# Patient Record
Sex: FEMALE | Race: WHITE | Employment: OTHER
[De-identification: names, ages, dates, MRNs, and addresses within clinical notes are randomized per-mention and may not be internally consistent; named-entity substitution may affect disease eponyms.]

---

## 2017-01-05 ENCOUNTER — PRIOR ORIGINAL RECORDS (OUTPATIENT)
Dept: OTHER | Age: 77
End: 2017-01-05

## 2017-01-06 RX ORDER — LORAZEPAM 0.5 MG/1
TABLET ORAL
Qty: 30 TABLET | Refills: 0 | Status: SHIPPED | OUTPATIENT
Start: 2017-01-06 | End: 2017-01-09

## 2017-01-09 RX ORDER — LORAZEPAM 0.5 MG/1
TABLET ORAL
Qty: 30 TABLET | Refills: 0 | Status: SHIPPED | OUTPATIENT
Start: 2017-01-09 | End: 2017-08-08

## 2017-02-13 ENCOUNTER — PRIOR ORIGINAL RECORDS (OUTPATIENT)
Dept: OTHER | Age: 77
End: 2017-02-13

## 2017-02-13 ENCOUNTER — APPOINTMENT (OUTPATIENT)
Dept: LAB | Age: 77
End: 2017-02-13
Attending: FAMILY MEDICINE
Payer: MEDICARE

## 2017-02-13 DIAGNOSIS — E66.9 OBESITY, UNSPECIFIED: ICD-10-CM

## 2017-02-13 DIAGNOSIS — I25.10 CORONARY ARTERY DISEASE INVOLVING NATIVE HEART WITHOUT ANGINA PECTORIS, UNSPECIFIED VESSEL OR LESION TYPE: ICD-10-CM

## 2017-02-13 DIAGNOSIS — I21.4 NSTEMI (NON-ST ELEVATED MYOCARDIAL INFARCTION) (HCC): ICD-10-CM

## 2017-02-13 DIAGNOSIS — E78.5 HYPERLIPIDEMIA, UNSPECIFIED HYPERLIPIDEMIA TYPE: ICD-10-CM

## 2017-02-13 DIAGNOSIS — E11.9 TYPE 2 DIABETES MELLITUS WITHOUT COMPLICATION, WITHOUT LONG-TERM CURRENT USE OF INSULIN (HCC): ICD-10-CM

## 2017-02-13 LAB
ALBUMIN SERPL-MCNC: 3.1 G/DL (ref 3.5–4.8)
ALP LIVER SERPL-CCNC: 106 U/L (ref 55–142)
ALT SERPL-CCNC: 13 U/L (ref 14–54)
AST SERPL-CCNC: 13 U/L (ref 15–41)
BILIRUB SERPL-MCNC: 0.3 MG/DL (ref 0.1–2)
BUN BLD-MCNC: 17 MG/DL (ref 8–20)
CALCIUM BLD-MCNC: 7.6 MG/DL (ref 8.3–10.3)
CHLORIDE: 113 MMOL/L (ref 101–111)
CHOLEST SMN-MCNC: 78 MG/DL (ref ?–200)
CO2: 26 MMOL/L (ref 22–32)
CREAT BLD-MCNC: 1.14 MG/DL (ref 0.55–1.02)
EST. AVERAGE GLUCOSE BLD GHB EST-MCNC: 134 MG/DL (ref 68–126)
GLUCOSE BLD-MCNC: 108 MG/DL (ref 70–99)
HBA1C MFR BLD HPLC: 6.3 % (ref ?–5.7)
HDLC SERPL-MCNC: 45 MG/DL (ref 45–?)
HDLC SERPL: 1.73 {RATIO} (ref ?–4.44)
LDLC SERPL CALC-MCNC: 16 MG/DL (ref ?–130)
M PROTEIN MFR SERPL ELPH: 6.4 G/DL (ref 6.1–8.3)
NONHDLC SERPL-MCNC: 33 MG/DL (ref ?–130)
POTASSIUM SERPL-SCNC: 4.8 MMOL/L (ref 3.6–5.1)
SODIUM SERPL-SCNC: 144 MMOL/L (ref 136–144)
TRIGLYCERIDES: 84 MG/DL (ref ?–150)
VLDL: 17 MG/DL (ref 5–40)

## 2017-02-13 PROCEDURE — 80053 COMPREHEN METABOLIC PANEL: CPT

## 2017-02-13 PROCEDURE — 80061 LIPID PANEL: CPT

## 2017-02-13 PROCEDURE — 36415 COLL VENOUS BLD VENIPUNCTURE: CPT

## 2017-02-13 PROCEDURE — 83036 HEMOGLOBIN GLYCOSYLATED A1C: CPT

## 2017-02-20 ENCOUNTER — OFFICE VISIT (OUTPATIENT)
Dept: FAMILY MEDICINE CLINIC | Facility: CLINIC | Age: 77
End: 2017-02-20

## 2017-02-20 ENCOUNTER — HOSPITAL ENCOUNTER (OUTPATIENT)
Dept: GENERAL RADIOLOGY | Age: 77
Discharge: HOME | End: 2017-02-20
Attending: FAMILY MEDICINE
Payer: MEDICARE

## 2017-02-20 VITALS
DIASTOLIC BLOOD PRESSURE: 68 MMHG | TEMPERATURE: 97 F | RESPIRATION RATE: 18 BRPM | SYSTOLIC BLOOD PRESSURE: 140 MMHG | HEIGHT: 62 IN | WEIGHT: 223 LBS | HEART RATE: 56 BPM | BODY MASS INDEX: 41.04 KG/M2

## 2017-02-20 DIAGNOSIS — M54.50 CHRONIC RIGHT-SIDED LOW BACK PAIN WITHOUT SCIATICA: ICD-10-CM

## 2017-02-20 DIAGNOSIS — E03.9 HYPOTHYROIDISM, UNSPECIFIED TYPE: ICD-10-CM

## 2017-02-20 DIAGNOSIS — E11.9 TYPE 2 DIABETES MELLITUS WITHOUT COMPLICATION, WITHOUT LONG-TERM CURRENT USE OF INSULIN (HCC): ICD-10-CM

## 2017-02-20 DIAGNOSIS — G89.29 CHRONIC RIGHT-SIDED LOW BACK PAIN WITHOUT SCIATICA: ICD-10-CM

## 2017-02-20 DIAGNOSIS — I10 ESSENTIAL HYPERTENSION WITH GOAL BLOOD PRESSURE LESS THAN 130/80: Primary | ICD-10-CM

## 2017-02-20 DIAGNOSIS — I25.10 CORONARY ARTERY DISEASE INVOLVING NATIVE CORONARY ARTERY OF NATIVE HEART WITHOUT ANGINA PECTORIS: ICD-10-CM

## 2017-02-20 DIAGNOSIS — E78.5 HYPERLIPIDEMIA, UNSPECIFIED HYPERLIPIDEMIA TYPE: ICD-10-CM

## 2017-02-20 LAB
APPEARANCE: CLEAR
MULTISTIX LOT#: NORMAL NUMERIC
PH, URINE: 5.5 (ref 4.5–8)
PROTEIN (URINE DIPSTICK): 30 MG/DL
SPECIFIC GRAVITY: 1.02 (ref 1–1.03)
URINE-COLOR: YELLOW
UROBILINOGEN,SEMI-QN: 0.2 MG/DL (ref 0–1.9)

## 2017-02-20 PROCEDURE — 81003 URINALYSIS AUTO W/O SCOPE: CPT | Performed by: FAMILY MEDICINE

## 2017-02-20 PROCEDURE — 99214 OFFICE O/P EST MOD 30 MIN: CPT | Performed by: FAMILY MEDICINE

## 2017-02-20 PROCEDURE — 72110 X-RAY EXAM L-2 SPINE 4/>VWS: CPT

## 2017-02-20 PROCEDURE — 72072 X-RAY EXAM THORAC SPINE 3VWS: CPT

## 2017-02-20 NOTE — PROGRESS NOTES
Dayne Sterling is a 77year old female. HPI:   Patient presents for recheck of her hypertension.  Pt has been following her diet as instructed, she does not take medications presently, home BP monitoring in the range of 652'Z systolic and 22'G diasto (mg/dL)   Date Value   04/09/2014 84   05/09/2013 115   09/18/2012 36   ----------  AST (U/L)   Date Value   02/13/2017 13*   11/14/2016 7*   10/24/2016 14*   04/09/2014 15   05/09/2013 25   09/18/2012 80*   ----------  ALT (U/L)   Date Value   02/13/2017 Social History:      Smoking Status: Never Smoker                      Smokeless Status: Never Used                        Alcohol Use: Yes           0.0 oz/week       0 Standard drinks or equivalent per week       Comment: 3 drinks a month         REVIE hyperlipidemia type  Continue atorvastatin 10 mg daily  - CMP in 6 months; Future  - Lipid in 6 months; Future    4. Hypothyroidism, unspecified type  Continue levothyroxine 300 µg daily  - TSH and Free T4 [E]; Future    5.  Type 2 diabetes mellitus without

## 2017-02-21 ENCOUNTER — TELEPHONE (OUTPATIENT)
Dept: FAMILY MEDICINE CLINIC | Facility: CLINIC | Age: 77
End: 2017-02-21

## 2017-02-21 DIAGNOSIS — G89.29 CHRONIC RIGHT-SIDED LOW BACK PAIN WITHOUT SCIATICA: Primary | ICD-10-CM

## 2017-02-21 DIAGNOSIS — M54.50 CHRONIC RIGHT-SIDED LOW BACK PAIN WITHOUT SCIATICA: Primary | ICD-10-CM

## 2017-03-01 ENCOUNTER — TELEPHONE (OUTPATIENT)
Dept: FAMILY MEDICINE CLINIC | Facility: CLINIC | Age: 77
End: 2017-03-01

## 2017-03-01 DIAGNOSIS — M54.50 CHRONIC RIGHT-SIDED LOW BACK PAIN WITHOUT SCIATICA: Primary | ICD-10-CM

## 2017-03-01 DIAGNOSIS — G89.29 CHRONIC RIGHT-SIDED LOW BACK PAIN WITHOUT SCIATICA: Primary | ICD-10-CM

## 2017-03-01 NOTE — TELEPHONE ENCOUNTER
Call from pt-sts \"dr dietz ordered an MRI for me last. Did not know how it was done, but scheduling explained. i cannot do that test unless they put me to sleep-am extremely claustrophobic. Can you see what else dr recommends? \"  Advised will update

## 2017-03-01 NOTE — TELEPHONE ENCOUNTER
Call to pt-advised of dr dietz's recommendation to do MRI with IV sedation-explained this and that she would need someone to drive her to and from that appt. Patient voices understanding/agrees with plan/no further questions.  Advised we will place new

## 2017-03-14 RX ORDER — SODIUM CHLORIDE, SODIUM LACTATE, POTASSIUM CHLORIDE, CALCIUM CHLORIDE 600; 310; 30; 20 MG/100ML; MG/100ML; MG/100ML; MG/100ML
INJECTION, SOLUTION INTRAVENOUS CONTINUOUS
Status: CANCELLED | OUTPATIENT
Start: 2017-03-14

## 2017-03-16 ENCOUNTER — TELEPHONE (OUTPATIENT)
Dept: FAMILY MEDICINE CLINIC | Facility: CLINIC | Age: 77
End: 2017-03-16

## 2017-03-16 NOTE — TELEPHONE ENCOUNTER
Received paperwork from Radiology asking for an H&P for pt's MRI being done 3/31/17. Called to pt and left message on unidentified vm, to call back and schedule.   Spoke with Dr Malcolm Cerda who stated that it was okay to schedule in 15 minute spot and block

## 2017-03-20 ENCOUNTER — APPOINTMENT (OUTPATIENT)
Dept: LAB | Age: 77
End: 2017-03-20
Payer: MEDICARE

## 2017-03-20 DIAGNOSIS — Z01.818 PRE-OP TESTING: ICD-10-CM

## 2017-03-20 LAB
ATRIAL RATE: 85 BPM
P AXIS: 72 DEGREES
P-R INTERVAL: 150 MS
Q-T INTERVAL: 404 MS
QRS DURATION: 132 MS
QTC CALCULATION (BEZET): 480 MS
R AXIS: -53 DEGREES
T AXIS: 24 DEGREES
VENTRICULAR RATE: 85 BPM

## 2017-03-20 PROCEDURE — 93005 ELECTROCARDIOGRAM TRACING: CPT

## 2017-03-20 PROCEDURE — 93010 ELECTROCARDIOGRAM REPORT: CPT | Performed by: INTERNAL MEDICINE

## 2017-03-27 ENCOUNTER — OFFICE VISIT (OUTPATIENT)
Dept: FAMILY MEDICINE CLINIC | Facility: CLINIC | Age: 77
End: 2017-03-27

## 2017-03-27 VITALS
HEART RATE: 68 BPM | TEMPERATURE: 98 F | SYSTOLIC BLOOD PRESSURE: 140 MMHG | OXYGEN SATURATION: 97 % | HEIGHT: 62 IN | WEIGHT: 224 LBS | RESPIRATION RATE: 14 BRPM | BODY MASS INDEX: 41.22 KG/M2 | DIASTOLIC BLOOD PRESSURE: 60 MMHG

## 2017-03-27 DIAGNOSIS — M54.50 CHRONIC RIGHT-SIDED LOW BACK PAIN WITHOUT SCIATICA: ICD-10-CM

## 2017-03-27 DIAGNOSIS — I25.10 CORONARY ARTERY DISEASE INVOLVING NATIVE HEART WITHOUT ANGINA PECTORIS, UNSPECIFIED VESSEL OR LESION TYPE: ICD-10-CM

## 2017-03-27 DIAGNOSIS — E66.9 DIABETES MELLITUS TYPE 2 IN OBESE (HCC): ICD-10-CM

## 2017-03-27 DIAGNOSIS — E11.69 DIABETES MELLITUS TYPE 2 IN OBESE (HCC): ICD-10-CM

## 2017-03-27 DIAGNOSIS — Z01.818 PREOPERATIVE CLEARANCE: Primary | ICD-10-CM

## 2017-03-27 DIAGNOSIS — E03.9 HYPOTHYROIDISM, UNSPECIFIED TYPE: ICD-10-CM

## 2017-03-27 DIAGNOSIS — I10 ESSENTIAL HYPERTENSION: ICD-10-CM

## 2017-03-27 DIAGNOSIS — G89.29 CHRONIC RIGHT-SIDED LOW BACK PAIN WITHOUT SCIATICA: ICD-10-CM

## 2017-03-27 PROCEDURE — 99214 OFFICE O/P EST MOD 30 MIN: CPT | Performed by: FAMILY MEDICINE

## 2017-03-27 RX ORDER — CLOPIDOGREL BISULFATE 75 MG/1
75 TABLET ORAL DAILY
Refills: 2 | COMMUNITY
Start: 2017-02-24 | End: 2021-05-25

## 2017-03-27 NOTE — PROGRESS NOTES
Slick Moore is a 69year old female who presents for a pre-operative physical exam. Patient is to have sedation for a thoracolumbar MRI, to be done in radiology at [facility] on 3/31/17.       HPI:   Her main complaint had been 3-4 months of a righ Cough    • H/O mammogram 10/21/1999   • History of PTCA 2011   • High blood pressure    • High cholesterol    • Asthma    • Disorder of thyroid    • Visual impairment      glasses   • Esophageal reflux    • Type II or unspecified type diabetes mellitus wit auscultation  CARDIO: RRR without murmur  GI: good BS's,no masses, HSM or tenderness  : deferred  EXTREMITIES: no cyanosis, clubbing or edema  NEURO: Oriented times three,cranial nerves are intact,motor and sensory are grossly intact    ASSESSMENT AND PL

## 2017-03-30 NOTE — IMAGING NOTE
-Left a message like to be on NPO x 8 hours prior, have a  to drive back home,be at the hospital not later than 1200 pm and the recovery is 3-6 hours. To call [phone] before 6pm today for questions.

## 2017-03-31 ENCOUNTER — ANESTHESIA (OUTPATIENT)
Dept: MRI IMAGING | Facility: HOSPITAL | Age: 77
End: 2017-03-31

## 2017-03-31 ENCOUNTER — HOSPITAL ENCOUNTER (OUTPATIENT)
Dept: MRI IMAGING | Facility: HOSPITAL | Age: 77
Discharge: HOME | End: 2017-03-31
Attending: FAMILY MEDICINE
Payer: MEDICARE

## 2017-03-31 ENCOUNTER — ANESTHESIA EVENT (OUTPATIENT)
Dept: MRI IMAGING | Facility: HOSPITAL | Age: 77
End: 2017-03-31

## 2017-03-31 VITALS
HEIGHT: 62 IN | WEIGHT: 210 LBS | BODY MASS INDEX: 38.64 KG/M2 | TEMPERATURE: 98 F | HEART RATE: 77 BPM | OXYGEN SATURATION: 99 % | SYSTOLIC BLOOD PRESSURE: 139 MMHG | DIASTOLIC BLOOD PRESSURE: 64 MMHG | RESPIRATION RATE: 16 BRPM

## 2017-03-31 DIAGNOSIS — M54.50 CHRONIC RIGHT-SIDED LOW BACK PAIN WITHOUT SCIATICA: ICD-10-CM

## 2017-03-31 DIAGNOSIS — G89.29 CHRONIC RIGHT-SIDED LOW BACK PAIN WITHOUT SCIATICA: ICD-10-CM

## 2017-03-31 DIAGNOSIS — Z01.818 PRE-OP TESTING: Primary | ICD-10-CM

## 2017-03-31 PROCEDURE — 72146 MRI CHEST SPINE W/O DYE: CPT

## 2017-03-31 PROCEDURE — 82962 GLUCOSE BLOOD TEST: CPT

## 2017-03-31 PROCEDURE — 72148 MRI LUMBAR SPINE W/O DYE: CPT

## 2017-03-31 RX ORDER — DEXTROSE MONOHYDRATE 25 G/50ML
50 INJECTION, SOLUTION INTRAVENOUS
Status: DISCONTINUED | OUTPATIENT
Start: 2017-03-31 | End: 2017-04-04

## 2017-03-31 RX ORDER — HYDROMORPHONE HYDROCHLORIDE 1 MG/ML
0.4 INJECTION, SOLUTION INTRAMUSCULAR; INTRAVENOUS; SUBCUTANEOUS EVERY 5 MIN PRN
Status: ACTIVE | OUTPATIENT
Start: 2017-03-31 | End: 2017-03-31

## 2017-03-31 RX ORDER — SODIUM CHLORIDE, SODIUM LACTATE, POTASSIUM CHLORIDE, CALCIUM CHLORIDE 600; 310; 30; 20 MG/100ML; MG/100ML; MG/100ML; MG/100ML
INJECTION, SOLUTION INTRAVENOUS CONTINUOUS
Status: DISCONTINUED | OUTPATIENT
Start: 2017-03-31 | End: 2017-04-04

## 2017-03-31 RX ORDER — NALOXONE HYDROCHLORIDE 0.4 MG/ML
80 INJECTION, SOLUTION INTRAMUSCULAR; INTRAVENOUS; SUBCUTANEOUS AS NEEDED
Status: ACTIVE | OUTPATIENT
Start: 2017-03-31 | End: 2017-03-31

## 2017-03-31 NOTE — ANESTHESIA POSTPROCEDURE EVALUATION
[address] Patient Status:  Outpatient   Age/Gender 69year old female MRN [id number]   [facility] MRI Attending Milvia Rodriguez MD   Hosp Day # 0 PCP Toño Kohler MD       Anesthesia Post-op Note    * No proc

## 2017-03-31 NOTE — ANESTHESIA PREPROCEDURE EVALUATION
PRE-OP EVALUATION    Patient Name: Ty Verdin    Pre-op Diagnosis: Chronic right-sided low back pain without sciatica     MRI THORACIC+LUMBAR SPINE     Pre-op vitals reviewed      Body mass index is 38.4 kg/(m^2). Current medications reviewed.   Hos       Comment x3    DUAL ENERGY X-RAY ABSORPTIOMETRY, BODY COMPOSITION STUDY, 1+ SITES  2011    SHOULDER SURG PROC UNLISTED      Comment R shoulder    COLONOSCOPY  2011    OTHER SURGICAL HISTORY      Comment [email or web address]    OTHER SURGICAL HI

## 2017-04-03 DIAGNOSIS — M48.04 SPINAL STENOSIS OF THORACIC REGION: Primary | ICD-10-CM

## 2017-04-17 RX ORDER — PANTOPRAZOLE SODIUM 40 MG/1
TABLET, DELAYED RELEASE ORAL
Qty: 90 TABLET | Refills: 1 | Status: SHIPPED | OUTPATIENT
Start: 2017-04-17 | End: 2017-10-16

## 2017-05-25 RX ORDER — LEVOTHYROXINE SODIUM 300 UG/1
TABLET ORAL
Qty: 90 TABLET | Refills: 0 | Status: SHIPPED | OUTPATIENT
Start: 2017-05-25 | End: 2017-06-19

## 2017-05-26 RX ORDER — LEVOTHYROXINE SODIUM 300 UG/1
TABLET ORAL
Qty: 90 TABLET | Refills: 0 | Status: SHIPPED | OUTPATIENT
Start: 2017-05-26 | End: 2017-06-19

## 2017-06-19 RX ORDER — LEVOTHYROXINE SODIUM 300 UG/1
TABLET ORAL
Qty: 90 TABLET | Refills: 0 | Status: SHIPPED | OUTPATIENT
Start: 2017-06-19 | End: 2017-10-16

## 2017-07-06 ENCOUNTER — PRIOR ORIGINAL RECORDS (OUTPATIENT)
Dept: OTHER | Age: 77
End: 2017-07-06

## 2017-07-06 LAB
CHOLESTEROL, TOTAL: 78 MG/DL
HDL CHOLESTEROL: 45 MG/DL
LDL CHOLESTEROL: 16 MG/DL
TRIGLYCERIDES: 84 MG/DL

## 2017-07-13 LAB
ALBUMIN: 3.1 G/DL
ALKALINE PHOSPHATATE(ALK PHOS): 106 IU/L
BILIRUBIN TOTAL: 0.3 MG/DL
BUN: 17 MG/DL
CALCIUM: 7.6 MG/DL
CHLORIDE: 113 MEQ/L
CHOLESTEROL, TOTAL: 78 MG/DL
CREATININE, SERUM: 1.14 MG/DL
GLUCOSE: 108 MG/DL
HDL CHOLESTEROL: 45 MG/DL
HEMOGLOBIN A1C: 6.3 %
LDL CHOLESTEROL: 16 MG/DL
POTASSIUM, SERUM: 4.8 MEQ/L
PROTEIN, TOTAL: 6.4 G/DL
SGOT (AST): 13 IU/L
SGPT (ALT): 13 IU/L
SODIUM: 144 MEQ/L
TRIGLYCERIDES: 84 MG/DL

## 2017-08-08 ENCOUNTER — PRIOR ORIGINAL RECORDS (OUTPATIENT)
Dept: OTHER | Age: 77
End: 2017-08-08

## 2017-08-08 ENCOUNTER — OFFICE VISIT (OUTPATIENT)
Dept: FAMILY MEDICINE CLINIC | Facility: CLINIC | Age: 77
End: 2017-08-08

## 2017-08-08 ENCOUNTER — LAB ENCOUNTER (OUTPATIENT)
Dept: LAB | Age: 77
End: 2017-08-08
Attending: FAMILY MEDICINE
Payer: MEDICARE

## 2017-08-08 VITALS
SYSTOLIC BLOOD PRESSURE: 110 MMHG | DIASTOLIC BLOOD PRESSURE: 60 MMHG | HEART RATE: 60 BPM | BODY MASS INDEX: 40.3 KG/M2 | WEIGHT: 219 LBS | RESPIRATION RATE: 16 BRPM | TEMPERATURE: 99 F | HEIGHT: 62 IN

## 2017-08-08 DIAGNOSIS — E78.5 HYPERLIPIDEMIA, UNSPECIFIED HYPERLIPIDEMIA TYPE: ICD-10-CM

## 2017-08-08 DIAGNOSIS — R29.898 LEG WEAKNESS, BILATERAL: ICD-10-CM

## 2017-08-08 DIAGNOSIS — Z01.84 IMMUNITY STATUS TESTING: ICD-10-CM

## 2017-08-08 DIAGNOSIS — E03.9 HYPOTHYROIDISM, UNSPECIFIED TYPE: ICD-10-CM

## 2017-08-08 DIAGNOSIS — D50.9 IRON DEFICIENCY ANEMIA, UNSPECIFIED IRON DEFICIENCY ANEMIA TYPE: ICD-10-CM

## 2017-08-08 DIAGNOSIS — E11.9 TYPE 2 DIABETES MELLITUS WITHOUT COMPLICATION, WITHOUT LONG-TERM CURRENT USE OF INSULIN (HCC): ICD-10-CM

## 2017-08-08 DIAGNOSIS — F32.A ANXIETY AND DEPRESSION: ICD-10-CM

## 2017-08-08 DIAGNOSIS — I25.10 CORONARY ARTERY DISEASE INVOLVING NATIVE HEART WITHOUT ANGINA PECTORIS, UNSPECIFIED VESSEL OR LESION TYPE: ICD-10-CM

## 2017-08-08 DIAGNOSIS — F41.9 ANXIETY AND DEPRESSION: ICD-10-CM

## 2017-08-08 DIAGNOSIS — I10 ESSENTIAL HYPERTENSION: ICD-10-CM

## 2017-08-08 DIAGNOSIS — Z00.00 ENCOUNTER FOR ANNUAL HEALTH EXAMINATION: Primary | ICD-10-CM

## 2017-08-08 DIAGNOSIS — I10 ESSENTIAL HYPERTENSION WITH GOAL BLOOD PRESSURE LESS THAN 130/80: ICD-10-CM

## 2017-08-08 DIAGNOSIS — E66.9 OBESITY, UNSPECIFIED: ICD-10-CM

## 2017-08-08 DIAGNOSIS — Z23 NEED FOR VACCINATION: ICD-10-CM

## 2017-08-08 DIAGNOSIS — E78.2 MIXED HYPERLIPIDEMIA: ICD-10-CM

## 2017-08-08 LAB
ALBUMIN SERPL-MCNC: 2.8 G/DL (ref 3.5–4.8)
ALP LIVER SERPL-CCNC: 114 U/L (ref 55–142)
ALT SERPL-CCNC: 11 U/L (ref 14–54)
AST SERPL-CCNC: 11 U/L (ref 15–41)
BASOPHILS # BLD AUTO: 0.05 X10(3) UL (ref 0–0.1)
BASOPHILS NFR BLD AUTO: 0.7 %
BILIRUB SERPL-MCNC: 0.3 MG/DL (ref 0.1–2)
BUN BLD-MCNC: 17 MG/DL (ref 8–20)
CALCIUM BLD-MCNC: 8 MG/DL (ref 8.3–10.3)
CHLORIDE: 111 MMOL/L (ref 101–111)
CHOLEST SMN-MCNC: 76 MG/DL (ref ?–200)
CO2: 27 MMOL/L (ref 22–32)
CREAT BLD-MCNC: 1.17 MG/DL (ref 0.55–1.02)
CREAT UR-SCNC: 173 MG/DL
EOSINOPHIL # BLD AUTO: 0.39 X10(3) UL (ref 0–0.3)
EOSINOPHIL NFR BLD AUTO: 5.2 %
ERYTHROCYTE [DISTWIDTH] IN BLOOD BY AUTOMATED COUNT: 16.1 % (ref 11.5–16)
EST. AVERAGE GLUCOSE BLD GHB EST-MCNC: 120 MG/DL (ref 68–126)
FREE T4: 1.7 NG/DL (ref 0.9–1.8)
GLUCOSE BLD-MCNC: 116 MG/DL (ref 70–99)
HBA1C MFR BLD HPLC: 5.8 % (ref ?–5.7)
HCT VFR BLD AUTO: 36.1 % (ref 34–50)
HDLC SERPL-MCNC: 43 MG/DL (ref 45–?)
HDLC SERPL: 1.77 {RATIO} (ref ?–4.44)
HGB BLD-MCNC: 10.5 G/DL (ref 12–16)
IMMATURE GRANULOCYTE COUNT: 0.03 X10(3) UL (ref 0–1)
IMMATURE GRANULOCYTE RATIO %: 0.4 %
LDLC SERPL CALC-MCNC: 17 MG/DL (ref ?–130)
LDLC SERPL-MCNC: 16 MG/DL (ref 5–40)
LYMPHOCYTES # BLD AUTO: 1.2 X10(3) UL (ref 0.9–4)
LYMPHOCYTES NFR BLD AUTO: 15.9 %
M PROTEIN MFR SERPL ELPH: 6.1 G/DL (ref 6.1–8.3)
MCH RBC QN AUTO: 25.7 PG (ref 27–33.2)
MCHC RBC AUTO-ENTMCNC: 29.1 G/DL (ref 31–37)
MCV RBC AUTO: 88.5 FL (ref 81–100)
MICROALBUMIN UR-MCNC: 0.73 MG/DL
MICROALBUMIN/CREAT 24H UR-RTO: 4.2 UG/MG (ref ?–30)
MONOCYTES # BLD AUTO: 0.56 X10(3) UL (ref 0.1–0.6)
MONOCYTES NFR BLD AUTO: 7.4 %
NEUTROPHIL ABS PRELIM: 5.3 X10 (3) UL (ref 1.3–6.7)
NEUTROPHILS # BLD AUTO: 5.3 X10(3) UL (ref 1.3–6.7)
NEUTROPHILS NFR BLD AUTO: 70.4 %
NONHDLC SERPL-MCNC: 33 MG/DL (ref ?–130)
PLATELET # BLD AUTO: 247 10(3)UL (ref 150–450)
POTASSIUM SERPL-SCNC: 4.4 MMOL/L (ref 3.6–5.1)
RBC # BLD AUTO: 4.08 X10(6)UL (ref 3.8–5.1)
RED CELL DISTRIBUTION WIDTH-SD: 52.2 FL (ref 35.1–46.3)
SODIUM SERPL-SCNC: 143 MMOL/L (ref 136–144)
TRIGLYCERIDES: 79 MG/DL (ref ?–150)
TSI SER-ACNC: 0.02 MIU/ML (ref 0.35–5.5)
WBC # BLD AUTO: 7.5 X10(3) UL (ref 4–13)

## 2017-08-08 PROCEDURE — 82043 UR ALBUMIN QUANTITATIVE: CPT

## 2017-08-08 PROCEDURE — 85025 COMPLETE CBC W/AUTO DIFF WBC: CPT

## 2017-08-08 PROCEDURE — 83036 HEMOGLOBIN GLYCOSYLATED A1C: CPT

## 2017-08-08 PROCEDURE — 82570 ASSAY OF URINE CREATININE: CPT

## 2017-08-08 PROCEDURE — G0009 ADMIN PNEUMOCOCCAL VACCINE: HCPCS | Performed by: FAMILY MEDICINE

## 2017-08-08 PROCEDURE — 84443 ASSAY THYROID STIM HORMONE: CPT

## 2017-08-08 PROCEDURE — 36415 COLL VENOUS BLD VENIPUNCTURE: CPT

## 2017-08-08 PROCEDURE — 86787 VARICELLA-ZOSTER ANTIBODY: CPT

## 2017-08-08 PROCEDURE — 80053 COMPREHEN METABOLIC PANEL: CPT

## 2017-08-08 PROCEDURE — 90732 PPSV23 VACC 2 YRS+ SUBQ/IM: CPT | Performed by: FAMILY MEDICINE

## 2017-08-08 PROCEDURE — 99214 OFFICE O/P EST MOD 30 MIN: CPT | Performed by: FAMILY MEDICINE

## 2017-08-08 PROCEDURE — 80061 LIPID PANEL: CPT

## 2017-08-08 PROCEDURE — 84439 ASSAY OF FREE THYROXINE: CPT

## 2017-08-08 PROCEDURE — G0439 PPPS, SUBSEQ VISIT: HCPCS | Performed by: FAMILY MEDICINE

## 2017-08-08 RX ORDER — LORAZEPAM 0.5 MG/1
TABLET ORAL
Qty: 30 TABLET | Refills: 1 | Status: SHIPPED | OUTPATIENT
Start: 2017-08-08 | End: 2017-10-16

## 2017-08-08 NOTE — PROGRESS NOTES
HPI:   Sonam Coker is a 69year old female who presents for a Medicare Subsequent Annual Wellness visit (Pt already had Initial Annual Wellness). Patient presents for recheck of her hypertension.  Pt has been following her diet as instructed, she Chemistry Labs:     Lab Results  Component Value Date   AST 11 (L) 08/08/2017   ALT 11 (L) 08/08/2017   CA 8.0 (L) 08/08/2017   ALB 2.8 (L) 08/08/2017   TSH 0.023 (L) 08/08/2017   CREATSERUM 1.17 (H) 08/08/2017    (H) 08/08/2017        CBC  (most re other surgical history. Her family history includes Cancer in her father. SOCIAL HISTORY:   She  reports that she has never smoked. She has never used smokeless tobacco. She reports that she drinks alcohol. She reports that she does not use drugs. Regular rate and rhythm, S1 and S2 normal, no murmur, rub, or gallop   Abdomen:   Soft, non-tender, bowel sounds active all four quadrants,  no masses, no organomegaly   Pelvic: Deferred   Extremities: Extremities normal, atraumatic, no cyanosis or edema OFFICE/OUTPT VISIT,EST,LEVL IV    Leg weakness, bilateral  -     OFFICE/OUTPT VISIT,EST,LEVL IV  -     OP REFERRAL TO [facility]    Need for vaccination  -     PNEUMOCOCCAL IMM (PNEUMOVAX)    Immunity status testing  -     VARICELLA Bathing or Showering: Able without help    Toileting: Able without help    Dressing: Able without help    Eating: Able without help    Driving: Able without help    Preparing your meals: Able without help    Managing money/bills: Able without help    Barrington Hagen AND SCHEDULE Internal Lab or Procedure External Lab or Procedure   Diabetes Screening      HbgA1C   Annually   Lab Results  Component Value Date   A1C 5.8 (H) 08/08/2017    No flowsheet data found.     Fasting Blood Sugar (FSB)Annually   Glucose (mg/dL)   D Once after 65 No vaccine history found Please get once after your 65th birthday    Pneumococcal 23 (Pneumovax)  Covered Once after 65 No vaccine history found Please get once after your 65th birthday    Hepatitis B for Moderate/High Risk No vaccine history data found. Dilated Eye exam  Annually Data entered on: 7/21/2016   Last Dilated Eye Exam 7/21/2016     No flowsheet data found. COPD      Spirometry Testing Annually Spirometry date:  No flowsheet data found.          Template: [facility] MEDICARE YO

## 2017-08-08 NOTE — PATIENT INSTRUCTIONS
Elan Kwok's SCREENING SCHEDULE   Tests on this list are recommended by your physician but may not be covered, or covered at this frequency, by your insurer. Please check with your insurance carrier before scheduling to verify coverage.    PREVENTATI or any previous visit.  Limited to patients who meet one of the following criteria:   • Men who are 73-68 years old and have smoked more than 100 cigarettes in their lifetime   • Anyone with a family history    Colorectal Cancer Screening  Covered up to Age Karen Soulier due on 03/11/1981 Please get this Mammogram regularly   Immunizations      Influenza  Covered Annually No orders found for this or any previous visit.  Please get every year    Pneumococcal 13 (Prevnar)  Covered Once after 65 No orders found Directives.

## 2017-08-16 LAB — VZV IGG SER IA-ACNC: POSITIVE

## 2017-08-21 ENCOUNTER — APPOINTMENT (OUTPATIENT)
Dept: PHYSICAL THERAPY | Age: 77
End: 2017-08-21
Attending: FAMILY MEDICINE
Payer: MEDICARE

## 2017-08-25 ENCOUNTER — OFFICE VISIT (OUTPATIENT)
Dept: PHYSICAL THERAPY | Age: 77
End: 2017-08-25
Attending: FAMILY MEDICINE
Payer: MEDICARE

## 2017-08-25 DIAGNOSIS — R29.898 LEG WEAKNESS, BILATERAL: ICD-10-CM

## 2017-08-25 PROCEDURE — 97163 PT EVAL HIGH COMPLEX 45 MIN: CPT

## 2017-08-25 PROCEDURE — 97110 THERAPEUTIC EXERCISES: CPT

## 2017-08-25 NOTE — PROGRESS NOTES
LOWER EXTREMITY EVALUATION:   Referring Physician: Dr. Yoly Hilliard  Diagnosis: Leg weakness, bilateral (L87.170)     Date of Service: 8/25/2017     PATIENT Yun Nvaas is a 69year old y/o female who presents to therapy today with complaints o head posture  Sensation: Sensation intact to light touch.     AROM:   Hip Knee Foot/Ankle   WNL Flexion: R 110 deg; L 125 deg  Extension: R 10 deg ext lag; L 0 deg DF: R +2; L +2     Flexibility:  Hip Flexor: R WNL, L WNL  Hamstrings: R 75 deg; L 80 deg  Ga deviations. (1)  Moderate Impairment: Walks 20’, slow speed, abnormal gait pattern, evidence of imbalance. (0)  Severe Impairment: Cannot walk 20’ without assistance, severe gait deviations or imbalance.      2. Change in gait speed: 2  Grading: Donny the moderate change in gait velocity, slows down, staggers but recovers, can continue to walk. (0) Severe Impairment: Preforms task with severe disruption of gait, i.e., staggers outside 15” path, loses balance, stops, reaches for wall.     Today’s Treatment a soon as possible to [phone].  If you have any questions, please contact me at Dept: [phone]    Sincerely,  Electronically signed by therapist: Jeannette Zavaleta PT    Physician's certification required: Yes  I certify the need for these services furn

## 2017-08-28 ENCOUNTER — APPOINTMENT (OUTPATIENT)
Dept: PHYSICAL THERAPY | Age: 77
End: 2017-08-28
Attending: FAMILY MEDICINE
Payer: MEDICARE

## 2017-08-29 ENCOUNTER — OFFICE VISIT (OUTPATIENT)
Dept: PHYSICAL THERAPY | Age: 77
End: 2017-08-29
Attending: FAMILY MEDICINE
Payer: MEDICARE

## 2017-08-29 PROCEDURE — 97116 GAIT TRAINING THERAPY: CPT

## 2017-08-29 PROCEDURE — 97112 NEUROMUSCULAR REEDUCATION: CPT

## 2017-08-29 NOTE — PROGRESS NOTES
Dx:   Leg weakness, bilateral (R29.898)          Authorized # of Visits:           Next MD visit: none scheduled  Fall Risk: standard         Precautions: n/a             Subjective: No new problems. Reports that she has been working on HEP 3x/day.     Obj

## 2017-08-31 ENCOUNTER — OFFICE VISIT (OUTPATIENT)
Dept: PHYSICAL THERAPY | Age: 77
End: 2017-08-31
Attending: FAMILY MEDICINE
Payer: MEDICARE

## 2017-08-31 PROCEDURE — 97112 NEUROMUSCULAR REEDUCATION: CPT

## 2017-08-31 NOTE — PROGRESS NOTES
Dx:   Leg weakness, bilateral (R29.898)          Authorized # of Visits:           Next MD visit: none scheduled  Fall Risk: standard         Precautions: n/a             Subjective: No new problems.   Reports that she is feeling a little stronger since sta

## 2017-09-05 ENCOUNTER — OFFICE VISIT (OUTPATIENT)
Dept: PHYSICAL THERAPY | Age: 77
End: 2017-09-05
Attending: FAMILY MEDICINE
Payer: MEDICARE

## 2017-09-05 PROCEDURE — 97112 NEUROMUSCULAR REEDUCATION: CPT

## 2017-09-05 NOTE — PROGRESS NOTES
Dx:   Leg weakness, bilateral (R29.898)          Authorized # of Visits:           Next MD visit: none scheduled  Fall Risk: standard         Precautions: n/a             Subjective: No new problems.   Reports she is feeling stronger but still has episodes training: lateral, narrow CR, bwd walking x10 min  CGA for all        Supine: SQC 10x 5 sec  Supine: SQC 10x 5 sec  shuttle leg press: 4 bands  bilat LE 2x10       Supine: L/R SLR x10 ea  Supine: L/R SLR x10 ea --        Supine: SAQ 10x 5 sec  Supine: SAQ

## 2017-09-07 ENCOUNTER — OFFICE VISIT (OUTPATIENT)
Dept: PHYSICAL THERAPY | Age: 77
End: 2017-09-07
Attending: FAMILY MEDICINE
Payer: MEDICARE

## 2017-09-07 PROCEDURE — 97112 NEUROMUSCULAR REEDUCATION: CPT

## 2017-09-07 NOTE — PROGRESS NOTES
Dx:   Leg weakness, bilateral (R29.898)          Authorized # of Visits:           Next MD visit: none scheduled  Fall Risk: standard         Precautions: n/a             Subjective: No new problems. Reports knees are less sore now following exercises.  Fe R/L LE lead: ASU, LSU 6\" step x12 ea       sit<>stand from chair x10   No UEs  sit<>stand from chair x10   No UEs  sit<>stand from chair x10   No UEs  30 sec sit<>stand: 9         TU sec       gait training: lateralaliyah, bwd walking x10 min

## 2017-09-11 ENCOUNTER — OFFICE VISIT (OUTPATIENT)
Dept: PHYSICAL THERAPY | Age: 77
End: 2017-09-11
Attending: FAMILY MEDICINE
Payer: MEDICARE

## 2017-09-11 PROCEDURE — 97112 NEUROMUSCULAR REEDUCATION: CPT

## 2017-09-11 NOTE — PROGRESS NOTES
Dx:   Leg weakness, bilateral (R29.898)          Authorized # of Visits:           Next MD visit: none scheduled  Fall Risk: standard         Precautions: n/a             Subjective: No new problems.   Reports that she was walking to [place] and noticed less ASU, LSU 4\" step x10 ea  ASU, LSU 4\" step x12 ea  ASU, LSU 6\" step x12 ea  R/L LE lead: ASU, LSU 6\" step x12 ea  R/L LE lead: ASU, LSU 6\" step x15 ea      sit<>stand from chair x10   No UEs  sit<>stand from chair x10   No UEs  sit<>stand from [place]

## 2017-09-14 ENCOUNTER — OFFICE VISIT (OUTPATIENT)
Dept: PHYSICAL THERAPY | Age: 77
End: 2017-09-14
Attending: FAMILY MEDICINE
Payer: MEDICARE

## 2017-09-14 PROCEDURE — 97112 NEUROMUSCULAR REEDUCATION: CPT

## 2017-09-14 NOTE — PROGRESS NOTES
Dx:   Leg weakness, bilateral (R29.898)          Authorized # of Visits:           Next MD visit: none scheduled  Fall Risk: standard         Precautions: n/a             Subjective: No new problems.   Reports that she was walking to [employer] and noticed less max  SLB:   R: 14 sec max  L: 5 sec max  SLB:   R: 12 sec max  L: 8 sec max  SLB:   R:  17 sec max  L: 12 sec max  SLB:   R:  19 sec max  L: 18 sec max  SLB:   R:  17 sec max  L: 23 sec max     ASU, LSU 4\" step x10 ea  ASU, LSU 4\" step x12 ea  ASU, LSU 6

## 2017-09-19 ENCOUNTER — OFFICE VISIT (OUTPATIENT)
Dept: PHYSICAL THERAPY | Age: 77
End: 2017-09-19
Attending: FAMILY MEDICINE
Payer: MEDICARE

## 2017-09-19 PROCEDURE — 97112 NEUROMUSCULAR REEDUCATION: CPT

## 2017-09-19 NOTE — PROGRESS NOTES
Dx:   Leg weakness, bilateral (R29.898)          Authorized # of Visits:           Next MD visit: none scheduled  Fall Risk: standard         Precautions: n/a             Subjective: No new problems.  Reports that she did a lot of walking on Sunday at the [facility] Abd, flex, ext x10 ea YTB  Standing SLR R/L LE:  Abd, flex, ext x12 ea YTB  Standing SLR R/L LE:  Abd, flex, ext x12 ea RTB  Standing SLR R/L LE:  Abd, flex, ext x12 ea RTB  Standing SLR R/L LE:  Abd, flex, ext x12 ea RTB  Standing SLR R/L LE:  Abd, flex, Charges: Nreed 3       Total Timed Treatment: 45 min  Total Treatment Time: 55 min

## 2017-09-22 ENCOUNTER — OFFICE VISIT (OUTPATIENT)
Dept: PHYSICAL THERAPY | Age: 77
End: 2017-09-22
Attending: FAMILY MEDICINE
Payer: MEDICARE

## 2017-09-22 PROCEDURE — 97112 NEUROMUSCULAR REEDUCATION: CPT

## 2017-09-22 NOTE — PROGRESS NOTES
Dx:   Leg weakness, bilateral (R29.898)          Authorized # of Visits:           Next MD visit: none scheduled  Fall Risk: standard         Precautions: n/a             Subjective: No new problems. Reports she continues to feel stronger.   Denies falls si LE:  Abd, flex, ext x12 ea YTB  Standing SLR R/L LE:  Abd, flex, ext x12 ea RTB  Standing SLR R/L LE:  Abd, flex, ext x12 ea RTB  Standing SLR R/L LE:  Abd, flex, ext x12 ea RTB  Standing SLR R/L LE:  Abd, flex, ext x15 ea RTB  Standing SLR R/L LE:  Abd, f Supine: SAQ 10x 5 sec  Supine: SAQ 10x 5 sec --  --  --    CP x10 min bilat knees  CP x10 min bilat knees  CP x10 min bilat knees  CP x10 min bilat knees  CP x10 min bilat knees  CP x10 min bilat knees   CP x10 min bilat knees  CP x10 min bilat knees

## 2017-09-26 ENCOUNTER — OFFICE VISIT (OUTPATIENT)
Dept: PHYSICAL THERAPY | Age: 77
End: 2017-09-26
Attending: FAMILY MEDICINE
Payer: MEDICARE

## 2017-09-26 PROCEDURE — 97112 NEUROMUSCULAR REEDUCATION: CPT

## 2017-09-26 NOTE — PROGRESS NOTES
Progress Summary    Pt has attended 10, cancelled 0, and no shown 0 visits in Physical Therapy. Katie Villatorobrittnipavan reports feeling 50% improvement in balance and function since starting therapy.  She reports that overall she is feeling better with community ambulation b progress achieved thus far    Plan: Continue PT POC x 4 more visits to achieve goals still in progress.    Patient/Family/Caregiver was advised of these findings, precautions, and treatment options and has agreed to actively participate in planning and for flex, ext x12 ea RTB  Standing SLR R/L LE:  Abd, flex, ext x15 ea RTB  Standing SLR R/L LE:  Abd, flex, ext x15 ea RTB  Standing SLR R/L LE:  Abd, flex, ext x15 ea RTB    R/L LE lead: ASU, LSU 6\" step x15 ea  R/L LE lead: ASU, LSU 6\" step x15 ea  R/L LE

## 2017-09-28 ENCOUNTER — OFFICE VISIT (OUTPATIENT)
Dept: PHYSICAL THERAPY | Age: 77
End: 2017-09-28
Attending: FAMILY MEDICINE
Payer: MEDICARE

## 2017-09-28 PROCEDURE — 97112 NEUROMUSCULAR REEDUCATION: CPT

## 2017-09-28 NOTE — PROGRESS NOTES
Dx:   Leg weakness, bilateral (R29.898)          Authorized # of Visits:           Next MD visit: none scheduled  Fall Risk: standard         Precautions: n/a             Subjective: Reports no new problems and doing okay since last visit.   Working on ASU, LSU 6\" step x15 ea  R/L LE lead: ASU, LSU 8\" step x10 ea  R/L LE lead: ASU, LSU 8\" step x10 ea  L LE lead: ASU 8\" step x5 . ...stopped after 5th rep due to pt exhibiting a cramp in posterior knee which did not resolve completely even with rest and

## 2017-10-03 ENCOUNTER — APPOINTMENT (OUTPATIENT)
Dept: PHYSICAL THERAPY | Age: 77
End: 2017-10-03
Attending: FAMILY MEDICINE
Payer: MEDICARE

## 2017-10-06 ENCOUNTER — OFFICE VISIT (OUTPATIENT)
Dept: PHYSICAL THERAPY | Age: 77
End: 2017-10-06
Attending: FAMILY MEDICINE
Payer: MEDICARE

## 2017-10-06 PROCEDURE — 97112 NEUROMUSCULAR REEDUCATION: CPT

## 2017-10-06 NOTE — PROGRESS NOTES
Dx:   Leg weakness, bilateral (R29.898)          Authorized # of Visits:           Next MD visit: none scheduled  Fall Risk: standard         Precautions: n/a             Subjective: Reports that the left hamstring soreness remained resolved following x15 ea RTB    R/L LE lead: ASU, LSU 6\" step x15 ea  R/L LE lead: ASU, LSU 6\" step x15 ea  R/L LE lead: ASU, LSU 8\" step x10 ea  R/L LE lead: ASU, LSU 8\" step x10 ea  L LE lead: ASU 8\" step x5 . ...stopped after 5th rep due to pt exhibiting a cramp in p

## 2017-10-10 ENCOUNTER — OFFICE VISIT (OUTPATIENT)
Dept: PHYSICAL THERAPY | Age: 77
End: 2017-10-10
Attending: FAMILY MEDICINE
Payer: MEDICARE

## 2017-10-10 PROCEDURE — 97112 NEUROMUSCULAR REEDUCATION: CPT

## 2017-10-10 NOTE — PROGRESS NOTES
Dx:   Leg weakness, bilateral (R29.898)          Authorized # of Visits:           Next MD visit: none scheduled  Fall Risk: standard         Precautions: n/a             Subjective: Reports that she is feeling stronger overall and balance is better.  D RTB  Standing SLR R/L LE:  Abd, flex, ext x15 ea RTB  lateral walking with RTB 35ft x 3    R/L LE lead: ASU, LSU 6\" step x15 ea  R/L LE lead: ASU, LSU 6\" step x15 ea  R/L LE lead: ASU, LSU 8\" step x10 ea  R/L LE lead: ASU, LSU 8\" step x10 ea  L LE lead

## 2017-10-12 ENCOUNTER — OFFICE VISIT (OUTPATIENT)
Dept: PHYSICAL THERAPY | Age: 77
End: 2017-10-12
Attending: FAMILY MEDICINE
Payer: MEDICARE

## 2017-10-12 PROCEDURE — 97112 NEUROMUSCULAR REEDUCATION: CPT

## 2017-10-12 NOTE — PROGRESS NOTES
Discharge Summary    Pt has attended 14 visits in Physical Therapy. Kadie Lopez reports feeling significant improvement in balance since starting therapy. She demonstrates significant improvement in postural control with normal values with all balance testing. referral. If you have any questions, please contact me at Dept: [phone].     Sincerely,  Electronically signed by therapist: Chinyere Zhang PT      Dx:   Leg weakness, bilateral [phone]        Authorized # of Visits:           Next MD visit: none s minisquats 2x10  minisquats 2x10  minisquats 2x10  minisquats 2x10    Standing SLR R/L LE:  Abd, flex, ext x12 ea RTB  Standing SLR R/L LE:  Abd, flex, ext x15 ea RTB  Standing SLR R/L LE:  Abd, flex, ext x15 ea RTB  Standing SLR R/L LE:  Abd, flex, ext x1 --  --  --  MD re-assess   -- RE-assess    CP x10 min bilat knees   CP x10 min bilat knees  CP x10 min bilat knees  CP x10 min bilat knees  CP x10 min  CP x10 min bilat knees  CP x10 min bilat knees  CP x10 min bilat knees       Charges: Nreed 3       To

## 2017-10-16 ENCOUNTER — TELEPHONE (OUTPATIENT)
Dept: FAMILY MEDICINE CLINIC | Facility: CLINIC | Age: 77
End: 2017-10-16

## 2017-10-16 DIAGNOSIS — F41.9 ANXIETY AND DEPRESSION: ICD-10-CM

## 2017-10-16 DIAGNOSIS — F32.A ANXIETY AND DEPRESSION: ICD-10-CM

## 2017-10-16 RX ORDER — LEVOTHYROXINE SODIUM 300 UG/1
TABLET ORAL
Qty: 90 TABLET | Refills: 0 | Status: SHIPPED | OUTPATIENT
Start: 2017-10-16 | End: 2017-12-11

## 2017-10-16 RX ORDER — PANTOPRAZOLE SODIUM 40 MG/1
TABLET, DELAYED RELEASE ORAL
Qty: 90 TABLET | Refills: 0 | Status: SHIPPED | OUTPATIENT
Start: 2017-10-16 | End: 2018-01-08

## 2017-10-16 RX ORDER — LORAZEPAM 0.5 MG/1
TABLET ORAL
Qty: 30 TABLET | Refills: 1 | Status: SHIPPED | OUTPATIENT
Start: 2017-10-16 | End: 2018-02-24

## 2017-10-16 NOTE — TELEPHONE ENCOUNTER
Jaime Gregory sts pt told her pharmacy will fax refill request for the 4 meds she needs. Fax will go to provider. Closed this encounter.

## 2017-10-16 NOTE — TELEPHONE ENCOUNTER
Fax from [address] for lorazepam 8/7 #30 plus 1  Seen 8/8 for meds and MAW  Please approve if appropriate. Thanks.

## 2017-10-16 NOTE — TELEPHONE ENCOUNTER
Pt went to [facility] and left medications here in [place]. [facility] (PHONE: [phone], FAX: [phone]) faxed request for four medications to be filled for 10 days.

## 2017-10-16 NOTE — TELEPHONE ENCOUNTER
Need the names of which meds pt is requesting. Unable to reach pt by phone now. Please call pt for speicific info-then route to triage.

## 2017-11-27 RX ORDER — LEVOTHYROXINE SODIUM 300 UG/1
TABLET ORAL
Qty: 90 TABLET | Refills: 0 | Status: SHIPPED | OUTPATIENT
Start: 2017-11-27 | End: 2018-03-03

## 2017-12-11 RX ORDER — LEVOTHYROXINE SODIUM 300 UG/1
TABLET ORAL
Qty: 90 TABLET | Refills: 0 | Status: SHIPPED | OUTPATIENT
Start: 2017-12-11 | End: 2019-03-11 | Stop reason: DRUGHIGH

## 2017-12-11 NOTE — TELEPHONE ENCOUNTER
Protocol failed d/t      Thyroid Supplements Protocol [id number] 5:21 PM   TSH value between 0.350 and 5.500 IU/ml     Last labs   8/8/17  9:28 AM    Free T4 0.9 - 1.8 ng/dL 1.7    TSH 0.350 - 5.500 mIU/mL 0.023

## 2017-12-26 ENCOUNTER — OFFICE VISIT (OUTPATIENT)
Dept: FAMILY MEDICINE CLINIC | Facility: CLINIC | Age: 77
End: 2017-12-26

## 2017-12-26 VITALS
SYSTOLIC BLOOD PRESSURE: 138 MMHG | BODY MASS INDEX: 39.75 KG/M2 | WEIGHT: 216 LBS | DIASTOLIC BLOOD PRESSURE: 78 MMHG | HEIGHT: 62 IN

## 2017-12-26 DIAGNOSIS — J01.90 ACUTE SINUSITIS, RECURRENCE NOT SPECIFIED, UNSPECIFIED LOCATION: Primary | ICD-10-CM

## 2017-12-26 DIAGNOSIS — R05.9 COUGH: ICD-10-CM

## 2017-12-26 PROCEDURE — 99213 OFFICE O/P EST LOW 20 MIN: CPT | Performed by: FAMILY MEDICINE

## 2017-12-26 RX ORDER — AMOXICILLIN 875 MG/1
875 TABLET, COATED ORAL 2 TIMES DAILY
Qty: 20 TABLET | Refills: 0 | Status: SHIPPED | OUTPATIENT
Start: 2017-12-26 | End: 2018-03-30 | Stop reason: ALTCHOICE

## 2017-12-26 NOTE — PROGRESS NOTES
CHIEF COMPLAINT:   sick  HPI:   Letty Solitario is a 69year old female who presents for upper respiratory symptoms for  5  days. Patient reports congestion. Cough. Temps 99s. Lots of pressure in head. Lots of nasal drainage. Frontal and sinus pain. complication, not stated as uncontrolled     not taking any meds   • Unspecified essential hypertension    • Visual impairment     glasses      Past Surgical History:  : ANGIOPLASTY (CORONARY)      Comment: stent in L ant descend  No date:  effects and expected outcomes of medications discussed. The patient indicates understanding of these issues and agrees to the plan. The patient is asked to return if symptoms persist or worsen.

## 2018-01-08 RX ORDER — PANTOPRAZOLE SODIUM 40 MG/1
TABLET, DELAYED RELEASE ORAL
Qty: 90 TABLET | Refills: 0 | Status: SHIPPED | OUTPATIENT
Start: 2018-01-08 | End: 2018-01-11

## 2018-01-08 NOTE — TELEPHONE ENCOUNTER
Non protocol medication. LOV 12/26/17(acute). Last refill 10/16/2017.   Please approve if appropriate, thank you

## 2018-01-09 ENCOUNTER — PRIOR ORIGINAL RECORDS (OUTPATIENT)
Dept: OTHER | Age: 78
End: 2018-01-09

## 2018-01-11 RX ORDER — PANTOPRAZOLE SODIUM 40 MG/1
TABLET, DELAYED RELEASE ORAL
Qty: 90 TABLET | Refills: 0 | Status: SHIPPED | OUTPATIENT
Start: 2018-01-11 | End: 2018-08-15

## 2018-01-19 LAB
ALBUMIN: 2.8 G/DL
ALKALINE PHOSPHATATE(ALK PHOS): 114 IU/L
BILIRUBIN TOTAL: 0.3 MG/DL
BUN: 17 MG/DL
CALCIUM: 8 MG/DL
CHLORIDE: 111 MEQ/L
CHOLESTEROL, TOTAL: 76 MG/DL
CREATININE, SERUM: 1.17 MG/DL
GLUCOSE: 116 MG/DL
HDL CHOLESTEROL: 43 MG/DL
HEMATOCRIT: 36.1 %
HEMOGLOBIN A1C: 5.8 %
HEMOGLOBIN: 10.5 G/DL
LDL CHOLESTEROL: 17 MG/DL
PLATELETS: 247 K/UL
POTASSIUM, SERUM: 4.4 MEQ/L
PROTEIN, TOTAL: 6.1 G/DL
RED BLOOD COUNT: 4.08 X 10-6/U
SGOT (AST): 11 IU/L
SGPT (ALT): 11 IU/L
SODIUM: 143 MEQ/L
TRIGLYCERIDES: 79 MG/DL
WHITE BLOOD COUNT: 7.5 X 10-3/U

## 2018-01-25 ENCOUNTER — PRIOR ORIGINAL RECORDS (OUTPATIENT)
Dept: OTHER | Age: 78
End: 2018-01-25

## 2018-01-25 ENCOUNTER — MYAURORA ACCOUNT LINK (OUTPATIENT)
Dept: OTHER | Age: 78
End: 2018-01-25

## 2018-02-05 NOTE — TELEPHONE ENCOUNTER
Pt needs appt with Dr Kyaw Pettit (anxiety/depression) before any refills. Please have them call to schedule.  Thanks

## 2018-02-24 DIAGNOSIS — F41.9 ANXIETY AND DEPRESSION: ICD-10-CM

## 2018-02-24 DIAGNOSIS — F32.A ANXIETY AND DEPRESSION: ICD-10-CM

## 2018-02-26 ENCOUNTER — APPOINTMENT (OUTPATIENT)
Dept: CT IMAGING | Facility: HOSPITAL | Age: 78
End: 2018-02-26
Attending: PHYSICIAN ASSISTANT
Payer: MEDICARE

## 2018-02-26 ENCOUNTER — HOSPITAL ENCOUNTER (EMERGENCY)
Facility: HOSPITAL | Age: 78
Discharge: HOME | End: 2018-02-26
Attending: EMERGENCY MEDICINE
Payer: MEDICARE

## 2018-02-26 VITALS
TEMPERATURE: 97 F | OXYGEN SATURATION: 97 % | RESPIRATION RATE: 18 BRPM | HEART RATE: 52 BPM | SYSTOLIC BLOOD PRESSURE: 140 MMHG | DIASTOLIC BLOOD PRESSURE: 55 MMHG

## 2018-02-26 DIAGNOSIS — S09.90XA CLOSED HEAD INJURY, INITIAL ENCOUNTER: ICD-10-CM

## 2018-02-26 DIAGNOSIS — S16.1XXA STRAIN OF NECK MUSCLE, INITIAL ENCOUNTER: ICD-10-CM

## 2018-02-26 DIAGNOSIS — S00.03XA CONTUSION OF SCALP, INITIAL ENCOUNTER: Primary | ICD-10-CM

## 2018-02-26 PROCEDURE — 99284 EMERGENCY DEPT VISIT MOD MDM: CPT

## 2018-02-26 PROCEDURE — 70450 CT HEAD/BRAIN W/O DYE: CPT | Performed by: PHYSICIAN ASSISTANT

## 2018-02-26 PROCEDURE — 72125 CT NECK SPINE W/O DYE: CPT | Performed by: PHYSICIAN ASSISTANT

## 2018-02-26 RX ORDER — LORAZEPAM 0.5 MG/1
TABLET ORAL
Qty: 30 TABLET | Refills: 0 | Status: SHIPPED | OUTPATIENT
Start: 2018-02-26 | End: 2018-03-30

## 2018-02-26 NOTE — TELEPHONE ENCOUNTER
Last filled 10/16/2017 with # 30 with 1 refill. Last seen x 6 months ago physical and has an upcoming follow up for Anxiety in 1 month.

## 2018-02-26 NOTE — ED PROVIDER NOTES
Patient Seen in: [facility] Emergency Department    History   Patient presents with:  Fall (musculoskeletal, neurologic)  Head Neck Injury (neurologic, musculoskeletal)    Stated Complaint: fall hit head on nite stand    HPI    CHIEF COMPLAINT: Head complication     patient woke up during her colonoscopy was not provided enough sedation   • Asthma    • Cough    • Disorder of thyroid    • Esophageal reflux    • H/O mammogram 10/21/1999   • High blood pressure    • High cholesterol    • History of PTCA tenderness present. Respiratory: chest is non tender to palpation, breath sounds are equal without wheezing, rhonchi or rales. No crepitus  Cardiac: regular rate and rhythm, normal S1-S2  Gastrointestinal: soft without abdominal distention.   Bowel sounds [phone]  ------------------------------------------------------------       MDM     Patient CT of the neck and head did not show any acute injuries.   Patient is neurologically intact and had no neurological weakness, numbness or tingling to the upper lower

## 2018-03-05 RX ORDER — LEVOTHYROXINE SODIUM 300 UG/1
TABLET ORAL
Qty: 90 TABLET | Refills: 0 | Status: SHIPPED | OUTPATIENT
Start: 2018-03-05 | End: 2018-03-30

## 2018-03-30 ENCOUNTER — OFFICE VISIT (OUTPATIENT)
Dept: FAMILY MEDICINE CLINIC | Facility: CLINIC | Age: 78
End: 2018-03-30

## 2018-03-30 VITALS
TEMPERATURE: 99 F | DIASTOLIC BLOOD PRESSURE: 54 MMHG | BODY MASS INDEX: 38.46 KG/M2 | HEIGHT: 62 IN | RESPIRATION RATE: 12 BRPM | WEIGHT: 209 LBS | SYSTOLIC BLOOD PRESSURE: 114 MMHG | HEART RATE: 56 BPM

## 2018-03-30 DIAGNOSIS — E11.9 TYPE 2 DIABETES MELLITUS WITHOUT COMPLICATION, WITHOUT LONG-TERM CURRENT USE OF INSULIN (HCC): ICD-10-CM

## 2018-03-30 DIAGNOSIS — F32.A ANXIETY AND DEPRESSION: ICD-10-CM

## 2018-03-30 DIAGNOSIS — E03.9 HYPOTHYROIDISM, UNSPECIFIED TYPE: Primary | ICD-10-CM

## 2018-03-30 DIAGNOSIS — E78.2 MIXED HYPERLIPIDEMIA: ICD-10-CM

## 2018-03-30 DIAGNOSIS — F41.9 ANXIETY AND DEPRESSION: ICD-10-CM

## 2018-03-30 DIAGNOSIS — I10 ESSENTIAL HYPERTENSION: ICD-10-CM

## 2018-03-30 PROCEDURE — 99214 OFFICE O/P EST MOD 30 MIN: CPT | Performed by: FAMILY MEDICINE

## 2018-03-30 NOTE — PROGRESS NOTES
Slick Moore is a 69year old female. HPI:   Patient presents for recheck of her hypertension. Pt has been following her diet as instructed, she does not take medications presently, home BP monitoring in the range of 009'A systolic and 51'B diastolic. bronchitis    • Anesthesia complication     patient woke up during her colonoscopy was not provided enough sedation   • Asthma    • Cough    • Disorder of thyroid    • Esophageal reflux    • H/O mammogram 10/21/1999   • High blood pressure    • High choles TSH+FREE T4; Future    3. Anxiety and depression  Continue lorazepam 0.5 mg 1 tablet every 6 hours as needed  - LORazepam 0.5 MG Oral Tab; TAKE 1 TABLET BY MOUTH EVERY 6 HOURS  Dispense: 30 tablet; Refill: 2    4.  Mixed hyperlipidemia  Continue atorvastati

## 2018-03-31 RX ORDER — LORAZEPAM 0.5 MG/1
TABLET ORAL
Qty: 30 TABLET | Refills: 2 | Status: SHIPPED | OUTPATIENT
Start: 2018-03-31 | End: 2018-10-02

## 2018-04-06 RX ORDER — PANTOPRAZOLE SODIUM 40 MG/1
TABLET, DELAYED RELEASE ORAL
Qty: 90 TABLET | Refills: 1 | Status: SHIPPED | OUTPATIENT
Start: 2018-04-06 | End: 2018-09-26

## 2018-05-31 RX ORDER — LEVOTHYROXINE SODIUM 300 UG/1
TABLET ORAL
Qty: 90 TABLET | Refills: 0 | Status: SHIPPED | OUTPATIENT
Start: 2018-05-31 | End: 2018-08-15

## 2018-06-21 ENCOUNTER — TELEPHONE (OUTPATIENT)
Dept: FAMILY MEDICINE CLINIC | Facility: CLINIC | Age: 78
End: 2018-06-21

## 2018-08-14 ENCOUNTER — PRIOR ORIGINAL RECORDS (OUTPATIENT)
Dept: OTHER | Age: 78
End: 2018-08-14

## 2018-08-15 NOTE — H&P
: 1940  ACCOUNT:  [id number]  [phone]  PCP: Dr. Sherri Blackwell     TODAY'S DATE: 2018  DICTATED BY:  [Dr. Marce Jones: [Followup of .  CAD, of native vessels, Followup of Hypercholesteremia, pure and Followup of Nonr ALLERGIES: No Known Allergies    MEDICATIONS: Selected prescriptions see below    VITAL SIGNS: [B/P - 110/58 , Pulse - 58, Weight -  208, Height -   62 , BMI - 38.0 ]    CONS: stocky. WEIGHT: BMI parameters reviewed and discussed.  HEAD/FACE: no trauma 40MG      daily

## 2018-08-16 ENCOUNTER — HOSPITAL ENCOUNTER (OUTPATIENT)
Dept: GENERAL RADIOLOGY | Age: 78
Discharge: HOME | End: 2018-08-16
Attending: INTERNAL MEDICINE
Payer: MEDICARE

## 2018-08-16 ENCOUNTER — LAB ENCOUNTER (OUTPATIENT)
Dept: LAB | Age: 78
End: 2018-08-16
Attending: INTERNAL MEDICINE
Payer: MEDICARE

## 2018-08-16 ENCOUNTER — PRIOR ORIGINAL RECORDS (OUTPATIENT)
Dept: OTHER | Age: 78
End: 2018-08-16

## 2018-08-16 ENCOUNTER — APPOINTMENT (OUTPATIENT)
Dept: LAB | Age: 78
End: 2018-08-16
Attending: INTERNAL MEDICINE
Payer: MEDICARE

## 2018-08-16 DIAGNOSIS — I10 ESSENTIAL HYPERTENSION: ICD-10-CM

## 2018-08-16 DIAGNOSIS — E11.9 TYPE 2 DIABETES MELLITUS WITHOUT COMPLICATION, WITHOUT LONG-TERM CURRENT USE OF INSULIN (HCC): ICD-10-CM

## 2018-08-16 DIAGNOSIS — Z01.818 PRE-PROCEDURAL EXAMINATION: ICD-10-CM

## 2018-08-16 DIAGNOSIS — I25.10 CAD (CORONARY ARTERY DISEASE): ICD-10-CM

## 2018-08-16 DIAGNOSIS — E78.2 MIXED HYPERLIPIDEMIA: ICD-10-CM

## 2018-08-16 DIAGNOSIS — E03.9 HYPOTHYROIDISM, UNSPECIFIED TYPE: ICD-10-CM

## 2018-08-16 LAB
ALBUMIN SERPL-MCNC: 2.7 G/DL (ref 3.5–4.8)
ALBUMIN/GLOB SERPL: 0.8 {RATIO} (ref 1–2)
ALP LIVER SERPL-CCNC: 117 U/L (ref 55–142)
ALT SERPL-CCNC: 12 U/L (ref 14–54)
ANION GAP SERPL CALC-SCNC: 9 MMOL/L (ref 0–18)
AST SERPL-CCNC: 11 U/L (ref 15–41)
ATRIAL RATE: 62 BPM
BASOPHILS # BLD AUTO: 0.06 X10(3) UL (ref 0–0.1)
BASOPHILS NFR BLD AUTO: 0.8 %
BILIRUB SERPL-MCNC: 0.4 MG/DL (ref 0.1–2)
BUN BLD-MCNC: 26 MG/DL (ref 8–20)
BUN/CREAT SERPL: 21.1 (ref 10–20)
CALCIUM BLD-MCNC: 7.5 MG/DL (ref 8.3–10.3)
CHLORIDE SERPL-SCNC: 110 MMOL/L (ref 101–111)
CHOLEST SMN-MCNC: 90 MG/DL (ref ?–200)
CO2 SERPL-SCNC: 24 MMOL/L (ref 22–32)
CREAT BLD-MCNC: 1.23 MG/DL (ref 0.55–1.02)
CREAT UR-SCNC: 140 MG/DL
EOSINOPHIL # BLD AUTO: 0.39 X10(3) UL (ref 0–0.3)
EOSINOPHIL NFR BLD AUTO: 5.4 %
ERYTHROCYTE [DISTWIDTH] IN BLOOD BY AUTOMATED COUNT: 15.7 % (ref 11.5–16)
EST. AVERAGE GLUCOSE BLD GHB EST-MCNC: 111 MG/DL (ref 68–126)
GLOBULIN PLAS-MCNC: 3.5 G/DL (ref 2.5–4)
GLUCOSE BLD-MCNC: 105 MG/DL (ref 70–99)
HBA1C MFR BLD HPLC: 5.5 % (ref ?–5.7)
HCT VFR BLD AUTO: 38.3 % (ref 34–50)
HDLC SERPL-MCNC: 55 MG/DL (ref 40–59)
HGB BLD-MCNC: 11.5 G/DL (ref 12–16)
IMMATURE GRANULOCYTE COUNT: 0.02 X10(3) UL (ref 0–1)
IMMATURE GRANULOCYTE RATIO %: 0.3 %
LDLC SERPL CALC-MCNC: 20 MG/DL (ref ?–100)
LYMPHOCYTES # BLD AUTO: 1.29 X10(3) UL (ref 0.9–4)
LYMPHOCYTES NFR BLD AUTO: 17.8 %
M PROTEIN MFR SERPL ELPH: 6.2 G/DL (ref 6.1–8.3)
MCH RBC QN AUTO: 27.4 PG (ref 27–33.2)
MCHC RBC AUTO-ENTMCNC: 30 G/DL (ref 31–37)
MCV RBC AUTO: 91.2 FL (ref 81–100)
MICROALBUMIN UR-MCNC: 0.54 MG/DL
MICROALBUMIN/CREAT 24H UR-RTO: 3.9 UG/MG (ref ?–30)
MONOCYTES # BLD AUTO: 0.54 X10(3) UL (ref 0.1–1)
MONOCYTES NFR BLD AUTO: 7.4 %
NEUTROPHIL ABS PRELIM: 4.96 X10 (3) UL (ref 1.3–6.7)
NEUTROPHILS # BLD AUTO: 4.96 X10(3) UL (ref 1.3–6.7)
NEUTROPHILS NFR BLD AUTO: 68.3 %
NONHDLC SERPL-MCNC: 35 MG/DL (ref ?–130)
OSMOLALITY SERPL CALC.SUM OF ELEC: 301 MOSM/KG (ref 275–295)
P AXIS: 67 DEGREES
P-R INTERVAL: 150 MS
PLATELET # BLD AUTO: 260 10(3)UL (ref 150–450)
POTASSIUM SERPL-SCNC: 4.5 MMOL/L (ref 3.6–5.1)
Q-T INTERVAL: 464 MS
QRS DURATION: 128 MS
QTC CALCULATION (BEZET): 470 MS
R AXIS: -55 DEGREES
RBC # BLD AUTO: 4.2 X10(6)UL (ref 3.8–5.1)
RED CELL DISTRIBUTION WIDTH-SD: 52.2 FL (ref 35.1–46.3)
SODIUM SERPL-SCNC: 143 MMOL/L (ref 136–144)
T AXIS: 14 DEGREES
T4 FREE SERPL-MCNC: 1 NG/DL (ref 0.9–1.8)
TRIGL SERPL-MCNC: 76 MG/DL (ref 30–149)
TSI SER-ACNC: 4.25 MIU/ML (ref 0.35–5.5)
VENTRICULAR RATE: 62 BPM
VLDLC SERPL CALC-MCNC: 15 MG/DL (ref 0–30)
WBC # BLD AUTO: 7.3 X10(3) UL (ref 4–13)

## 2018-08-16 PROCEDURE — 80053 COMPREHEN METABOLIC PANEL: CPT

## 2018-08-16 PROCEDURE — 36415 COLL VENOUS BLD VENIPUNCTURE: CPT

## 2018-08-16 PROCEDURE — 93010 ELECTROCARDIOGRAM REPORT: CPT | Performed by: INTERNAL MEDICINE

## 2018-08-16 PROCEDURE — 84439 ASSAY OF FREE THYROXINE: CPT

## 2018-08-16 PROCEDURE — 80061 LIPID PANEL: CPT

## 2018-08-16 PROCEDURE — 83036 HEMOGLOBIN GLYCOSYLATED A1C: CPT

## 2018-08-16 PROCEDURE — 84443 ASSAY THYROID STIM HORMONE: CPT

## 2018-08-16 PROCEDURE — 71046 X-RAY EXAM CHEST 2 VIEWS: CPT | Performed by: INTERNAL MEDICINE

## 2018-08-16 PROCEDURE — 85025 COMPLETE CBC W/AUTO DIFF WBC: CPT

## 2018-08-16 PROCEDURE — 82570 ASSAY OF URINE CREATININE: CPT

## 2018-08-16 PROCEDURE — 82043 UR ALBUMIN QUANTITATIVE: CPT

## 2018-08-16 PROCEDURE — 93005 ELECTROCARDIOGRAM TRACING: CPT

## 2018-08-17 ENCOUNTER — HOSPITAL ENCOUNTER (OUTPATIENT)
Dept: CV DIAGNOSTICS | Facility: HOSPITAL | Age: 78
Discharge: HOME | End: 2018-08-17
Attending: INTERNAL MEDICINE

## 2018-08-17 ENCOUNTER — MYAURORA ACCOUNT LINK (OUTPATIENT)
Dept: OTHER | Age: 78
End: 2018-08-17

## 2018-08-17 DIAGNOSIS — I25.10 CORONARY ARTERIOSCLEROSIS: ICD-10-CM

## 2018-08-17 DIAGNOSIS — I65.23 BILATERAL CAROTID ARTERY STENOSIS: ICD-10-CM

## 2018-08-21 ENCOUNTER — HOSPITAL ENCOUNTER (OUTPATIENT)
Dept: INTERVENTIONAL RADIOLOGY/VASCULAR | Facility: HOSPITAL | Age: 78
Discharge: HOME | End: 2018-08-22
Attending: INTERNAL MEDICINE | Admitting: INTERNAL MEDICINE
Payer: MEDICARE

## 2018-08-21 DIAGNOSIS — I35.0 AORTIC STENOSIS: ICD-10-CM

## 2018-08-21 DIAGNOSIS — I25.10 CAD (CORONARY ARTERY DISEASE): Primary | ICD-10-CM

## 2018-08-21 DIAGNOSIS — R07.9 CHEST PAIN: ICD-10-CM

## 2018-08-21 LAB
ATRIAL RATE: 80 BPM
ISTAT ACTIVATED CLOTTING TIME: 384 SECONDS (ref 74–137)
P AXIS: 79 DEGREES
P-R INTERVAL: 180 MS
Q-T INTERVAL: 416 MS
QRS DURATION: 138 MS
QTC CALCULATION (BEZET): 479 MS
R AXIS: -62 DEGREES
T AXIS: 32 DEGREES
VENTRICULAR RATE: 80 BPM

## 2018-08-21 PROCEDURE — 4A033BC MEASUREMENT OF ARTERIAL PRESSURE, CORONARY, PERCUTANEOUS APPROACH: ICD-10-PCS | Performed by: INTERNAL MEDICINE

## 2018-08-21 PROCEDURE — B2101ZZ FLUOROSCOPY OF SINGLE CORONARY ARTERY USING LOW OSMOLAR CONTRAST: ICD-10-PCS | Performed by: INTERNAL MEDICINE

## 2018-08-21 PROCEDURE — 99153 MOD SED SAME PHYS/QHP EA: CPT

## 2018-08-21 PROCEDURE — 99152 MOD SED SAME PHYS/QHP 5/>YRS: CPT

## 2018-08-21 PROCEDURE — 93005 ELECTROCARDIOGRAM TRACING: CPT

## 2018-08-21 PROCEDURE — 93571 IV DOP VEL&/PRESS C FLO 1ST: CPT

## 2018-08-21 PROCEDURE — 92978 ENDOLUMINL IVUS OCT C 1ST: CPT

## 2018-08-21 PROCEDURE — 027035Z DILATION OF CORONARY ARTERY, ONE ARTERY WITH TWO DRUG-ELUTING INTRALUMINAL DEVICES, PERCUTANEOUS APPROACH: ICD-10-PCS | Performed by: INTERNAL MEDICINE

## 2018-08-21 PROCEDURE — 93010 ELECTROCARDIOGRAM REPORT: CPT | Performed by: INTERNAL MEDICINE

## 2018-08-21 PROCEDURE — 85347 COAGULATION TIME ACTIVATED: CPT

## 2018-08-21 PROCEDURE — B240ZZ3 ULTRASONOGRAPHY OF SINGLE CORONARY ARTERY, INTRAVASCULAR: ICD-10-PCS | Performed by: INTERNAL MEDICINE

## 2018-08-21 PROCEDURE — 93454 CORONARY ARTERY ANGIO S&I: CPT

## 2018-08-21 RX ORDER — MIDAZOLAM HYDROCHLORIDE 1 MG/ML
INJECTION INTRAMUSCULAR; INTRAVENOUS
Status: COMPLETED
Start: 2018-08-21 | End: 2018-08-21

## 2018-08-21 RX ORDER — LISINOPRIL 20 MG/1
20 TABLET ORAL DAILY
Status: DISCONTINUED | OUTPATIENT
Start: 2018-08-22 | End: 2018-08-22

## 2018-08-21 RX ORDER — SODIUM CHLORIDE 9 MG/ML
INJECTION, SOLUTION INTRAVENOUS CONTINUOUS
Status: ACTIVE | OUTPATIENT
Start: 2018-08-21 | End: 2018-08-21

## 2018-08-21 RX ORDER — ASPIRIN 81 MG/1
81 TABLET ORAL DAILY
Status: DISCONTINUED | OUTPATIENT
Start: 2018-08-22 | End: 2018-08-22

## 2018-08-21 RX ORDER — ASPIRIN 81 MG/1
324 TABLET, CHEWABLE ORAL ONCE
Status: DISCONTINUED | OUTPATIENT
Start: 2018-08-21 | End: 2018-08-21 | Stop reason: HOSPADM

## 2018-08-21 RX ORDER — HYDRALAZINE HYDROCHLORIDE 20 MG/ML
INJECTION INTRAMUSCULAR; INTRAVENOUS
Status: COMPLETED
Start: 2018-08-21 | End: 2018-08-21

## 2018-08-21 RX ORDER — PANTOPRAZOLE SODIUM 40 MG/1
40 TABLET, DELAYED RELEASE ORAL
Status: DISCONTINUED | OUTPATIENT
Start: 2018-08-21 | End: 2018-08-22

## 2018-08-21 RX ORDER — LEVOTHYROXINE SODIUM 0.15 MG/1
300 TABLET ORAL
Status: DISCONTINUED | OUTPATIENT
Start: 2018-08-22 | End: 2018-08-22

## 2018-08-21 RX ORDER — LIDOCAINE HYDROCHLORIDE 10 MG/ML
INJECTION, SOLUTION EPIDURAL; INFILTRATION; INTRACAUDAL; PERINEURAL
Status: COMPLETED
Start: 2018-08-21 | End: 2018-08-21

## 2018-08-21 RX ORDER — SODIUM CHLORIDE 9 MG/ML
INJECTION, SOLUTION INTRAVENOUS CONTINUOUS
Status: DISCONTINUED | OUTPATIENT
Start: 2018-08-21 | End: 2018-08-22

## 2018-08-21 RX ORDER — CLOPIDOGREL BISULFATE 75 MG/1
75 TABLET ORAL DAILY
Status: DISCONTINUED | OUTPATIENT
Start: 2018-08-22 | End: 2018-08-21

## 2018-08-21 RX ORDER — ADENOSINE 3 MG/ML
INJECTION, SOLUTION INTRAVENOUS
Status: COMPLETED
Start: 2018-08-21 | End: 2018-08-21

## 2018-08-21 RX ORDER — CLOPIDOGREL BISULFATE 75 MG/1
75 TABLET ORAL DAILY
Status: DISCONTINUED | OUTPATIENT
Start: 2018-08-22 | End: 2018-08-22

## 2018-08-21 RX ORDER — ATORVASTATIN CALCIUM 10 MG/1
10 TABLET, FILM COATED ORAL NIGHTLY
Status: DISCONTINUED | OUTPATIENT
Start: 2018-08-21 | End: 2018-08-22

## 2018-08-21 RX ORDER — HEPARIN SODIUM 5000 [USP'U]/ML
INJECTION, SOLUTION INTRAVENOUS; SUBCUTANEOUS
Status: COMPLETED
Start: 2018-08-21 | End: 2018-08-21

## 2018-08-21 RX ADMIN — Medication 12.5 MG: at 17:44:00

## 2018-08-21 RX ADMIN — ATORVASTATIN CALCIUM 10 MG: 10 TABLET, FILM COATED ORAL at 20:26:00

## 2018-08-21 RX ADMIN — SODIUM CHLORIDE: 9 INJECTION, SOLUTION INTRAVENOUS at 07:45:00

## 2018-08-21 NOTE — H&P
History & Physical Examination    Patient Name: Delaney Haro  MRN: [id number]  CSN: [id number]  YOB: 1940    Diagnosis: Coronary artery disease, history of prior PCI, recurrent chest pain    Present Illness: Cardiac catheter      Prescripti • High blood pressure    • High cholesterol    • History of PTCA 2011   • Hypothyroid    • Obesity, unspecified    • Type II or unspecified type diabetes mellitus without mention of complication, not stated as uncontrolled     not taking any meds   • Uns

## 2018-08-21 NOTE — PROGRESS NOTES
Pt ambulated to bathroom, voided. Rt groin remains c/d/i and soft. Report given to Cleo Frank RN. Pt to floor via wheelchair. hand off given to RN

## 2018-08-21 NOTE — PROCEDURES
[facility]  PCI Procedure Note    [facility]    CSN [id number] MRN [id number]   Admission Date 8/21/2018 Procedure Date 8/21/2018   Attending Physician Arik Jones MD Procedure Physician Carri Gruber MD     Pre-Procedure coronary artery. The proximal stent under intracoronary ultrasound guidance was postdilated with a 4.5 mm balloon. Intracoronary ultrasound showed excellent stent deployment. The right femoral artery was closed with a 6 [employer] Nikole Angio-Seal device.

## 2018-08-21 NOTE — PROGRESS NOTES
Rt groin quick clot saturated. Groin remains soft. quick clot replaced and femstop placed.  Salmeron inserted dr Margaret Escalante notified

## 2018-08-22 ENCOUNTER — PRIOR ORIGINAL RECORDS (OUTPATIENT)
Dept: OTHER | Age: 78
End: 2018-08-22

## 2018-08-22 VITALS
WEIGHT: 206 LBS | SYSTOLIC BLOOD PRESSURE: 127 MMHG | HEART RATE: 53 BPM | DIASTOLIC BLOOD PRESSURE: 39 MMHG | BODY MASS INDEX: 37.91 KG/M2 | HEIGHT: 62 IN | TEMPERATURE: 98 F | RESPIRATION RATE: 16 BRPM | OXYGEN SATURATION: 95 %

## 2018-08-22 LAB
ANION GAP SERPL CALC-SCNC: 5 MMOL/L (ref 0–18)
BUN BLD-MCNC: 19 MG/DL (ref 8–20)
BUN/CREAT SERPL: 17.6 (ref 10–20)
CALCIUM BLD-MCNC: 7.9 MG/DL (ref 8.3–10.3)
CHLORIDE SERPL-SCNC: 112 MMOL/L (ref 101–111)
CO2 SERPL-SCNC: 29 MMOL/L (ref 22–32)
CREAT BLD-MCNC: 1.08 MG/DL (ref 0.55–1.02)
ERYTHROCYTE [DISTWIDTH] IN BLOOD BY AUTOMATED COUNT: 15.7 % (ref 11.5–16)
GLUCOSE BLD-MCNC: 92 MG/DL (ref 70–99)
HCT VFR BLD AUTO: 35 % (ref 34–50)
HGB BLD-MCNC: 10.7 G/DL (ref 12–16)
MCH RBC QN AUTO: 27.5 PG (ref 27–33.2)
MCHC RBC AUTO-ENTMCNC: 30.6 G/DL (ref 31–37)
MCV RBC AUTO: 90 FL (ref 81–100)
OSMOLALITY SERPL CALC.SUM OF ELEC: 304 MOSM/KG (ref 275–295)
PLATELET # BLD AUTO: 191 10(3)UL (ref 150–450)
POTASSIUM SERPL-SCNC: 4.9 MMOL/L (ref 3.6–5.1)
RBC # BLD AUTO: 3.89 X10(6)UL (ref 3.8–5.1)
RED CELL DISTRIBUTION WIDTH-SD: 50.8 FL (ref 35.1–46.3)
SODIUM SERPL-SCNC: 146 MMOL/L (ref 136–144)
WBC # BLD AUTO: 6.8 X10(3) UL (ref 4–13)

## 2018-08-22 PROCEDURE — 80048 BASIC METABOLIC PNL TOTAL CA: CPT | Performed by: INTERNAL MEDICINE

## 2018-08-22 PROCEDURE — 99211 OFF/OP EST MAY X REQ PHY/QHP: CPT

## 2018-08-22 PROCEDURE — 85027 COMPLETE CBC AUTOMATED: CPT | Performed by: INTERNAL MEDICINE

## 2018-08-22 RX ADMIN — Medication 50 MG: at 09:27:00

## 2018-08-22 RX ADMIN — ASPIRIN 81 MG: 81 TABLET ORAL at 09:27:00

## 2018-08-22 RX ADMIN — CLOPIDOGREL BISULFATE 75 MG: 75 TABLET ORAL at 09:27:00

## 2018-08-22 RX ADMIN — LISINOPRIL 20 MG: 20 TABLET ORAL at 09:28:00

## 2018-08-22 RX ADMIN — PANTOPRAZOLE SODIUM 40 MG: 40 TABLET, DELAYED RELEASE ORAL at 05:21:00

## 2018-08-22 RX ADMIN — LEVOTHYROXINE SODIUM 300 MCG: 0.15 TABLET ORAL at 05:21:00

## 2018-08-22 RX ADMIN — Medication 12.5 MG: at 05:21:00

## 2018-08-22 NOTE — PLAN OF CARE
Patient tele dc'd. IV discontinued with catheter intact. Denies cp, sob, dizziness or palpitations. Denies calf tenderness. Discharge instructions reviewed with patient and spouse verbalized understanding.  Pt medication and their side effects reviewed with

## 2018-08-22 NOTE — PROGRESS NOTES
[facility]  Progress Note    Milan Patches Patient Status:  Outpatient in a Bed    3/11/1940 MRN [id number]   [facility] 8NE-A Attending César Maki MD   Hosp Day # 0 PCP Jennie Claros MD     Subjective:  Patient sitting PT 13.0 10/07/2011   INR 0.92 10/24/2016   INR 0.93 02/21/2012   INR 1.03 01/31/2012       Medications:    Current Facility-Administered Medications:  0.9%  NaCl infusion  Intravenous Continuous   aspirin EC tab 81 mg 81 mg Oral Daily   atorvastatin (LIP

## 2018-08-22 NOTE — DIETARY NOTE
Nutrition Short Note   Dietitian consult received per cardiac rehab standing order. Pt to be educated by cardiac rehab staff and encouraged to attend outpatient classes taught by RD. RD available PRN.      Scout Mcdowell, MS, RD, LDN

## 2018-08-24 ENCOUNTER — PATIENT OUTREACH (OUTPATIENT)
Dept: CASE MANAGEMENT | Age: 78
End: 2018-08-24

## 2018-08-24 DIAGNOSIS — Z02.9 ENCOUNTERS FOR ADMINISTRATIVE PURPOSE: ICD-10-CM

## 2018-08-24 NOTE — PROGRESS NOTES
Initial Post Discharge Follow Up   Discharge Date: 8/22/18  Contact Date: 8/24/2018    Consent Verification:  Assessment Completed With: Patient  HIPAA Verified?   Yes    Discharge Dx:     Coronary artery disease, history of prior PCI, recurrent chest pa tablet Rfl: 0   Clopidogrel Bisulfate 75 MG Oral Tab Take 75 mg by mouth daily. Disp:  Rfl: 2   lisinopril 20 MG Oral Tab Take 1 tablet (20 mg total) by mouth daily.  Disp: 30 tablet Rfl: 6   metoprolol Tartrate 25 MG Oral Tab Take 0.5 tablets (12.5 mg tota Medicare Annual Well Visit with MD Amador Worthy, Jasmine Roland ([address] [facility])        Amador Hernandez, [place], [place] [address] [place] [address], [place] [address]

## 2018-08-30 ENCOUNTER — OFFICE VISIT (OUTPATIENT)
Dept: FAMILY MEDICINE CLINIC | Facility: CLINIC | Age: 78
End: 2018-08-30
Payer: MEDICARE

## 2018-08-30 VITALS
BODY MASS INDEX: 39.38 KG/M2 | TEMPERATURE: 98 F | WEIGHT: 214 LBS | RESPIRATION RATE: 16 BRPM | HEART RATE: 84 BPM | DIASTOLIC BLOOD PRESSURE: 66 MMHG | SYSTOLIC BLOOD PRESSURE: 160 MMHG | HEIGHT: 62 IN

## 2018-08-30 DIAGNOSIS — I25.10 CORONARY ARTERY DISEASE INVOLVING NATIVE CORONARY ARTERY OF NATIVE HEART WITHOUT ANGINA PECTORIS: Primary | ICD-10-CM

## 2018-08-30 DIAGNOSIS — E78.2 MIXED HYPERLIPIDEMIA: ICD-10-CM

## 2018-08-30 DIAGNOSIS — I10 ESSENTIAL HYPERTENSION: ICD-10-CM

## 2018-08-30 DIAGNOSIS — E11.9 TYPE 2 DIABETES MELLITUS WITHOUT COMPLICATION, WITHOUT LONG-TERM CURRENT USE OF INSULIN (HCC): ICD-10-CM

## 2018-08-30 PROCEDURE — 1111F DSCHRG MED/CURRENT MED MERGE: CPT | Performed by: FAMILY MEDICINE

## 2018-08-30 PROCEDURE — 99495 TRANSJ CARE MGMT MOD F2F 14D: CPT | Performed by: FAMILY MEDICINE

## 2018-08-30 NOTE — PROGRESS NOTES
HPI:    Gerardo Miller is a 69year old female here today for hospital follow up.    She was discharged from Inpatient hospital, [facility] to Home   Admission Date: 8/21/18   Discharge Date: 8/22/18  Hospital Discharge Diagnoses (since 7/31/2018) to codeine. Current Meds:    Current Outpatient Prescriptions on File Prior to Visit:  Sertraline HCl 50 MG Oral Tab Take 1 tablet (50 mg total) by mouth once daily. PANTOPRAZOLE SODIUM 40 MG Oral Tab EC TAKE 1 TABLET (40 MG TOTAL) BY MOUTH DAILY.    L GENERAL: weight stable, energy stable, no sweating  SKIN: denies any unusual skin lesions  EYES: denies blurred vision or double vision  HEENT: denies nasal congestion, sinus pain or ST  LUNGS: denies shortness of breath with exertion  CARDIOVASCULAR: de encounter    Imaging & Consults:  None       Transitional Care Management Certification:  I certify that the following are true:  Communication with the patient was made within 2 business days of discharge on date above   Medical Decision Making- Based on

## 2018-08-31 RX ORDER — LEVOTHYROXINE SODIUM 300 UG/1
TABLET ORAL
Qty: 90 TABLET | Refills: 3 | Status: SHIPPED | OUTPATIENT
Start: 2018-08-31 | End: 2018-10-02

## 2018-09-11 ENCOUNTER — MYAURORA ACCOUNT LINK (OUTPATIENT)
Dept: OTHER | Age: 78
End: 2018-09-11

## 2018-09-11 ENCOUNTER — PRIOR ORIGINAL RECORDS (OUTPATIENT)
Dept: OTHER | Age: 78
End: 2018-09-11

## 2018-09-19 LAB
BUN: 19 MG/DL
CALCIUM: 7.9 MG/DL
CHLORIDE: 112 MEQ/L
CHOLESTEROL, TOTAL: 90 MG/DL
CREATININE, SERUM: 1.08 MG/DL
FREE T4: 1 MG/DL
GLUCOSE: 92 MG/DL
HDL CHOLESTEROL: 55 MG/DL
HEMATOCRIT: 35 %
HEMOGLOBIN A1C: 5.5 %
HEMOGLOBIN: 10.7 G/DL
LDL CHOLESTEROL: 20 MG/DL
PLATELETS: 191 K/UL
POTASSIUM, SERUM: 4.9 MEQ/L
RED BLOOD COUNT: 3.89 X 10-6/U
SODIUM: 146 MEQ/L
THYROID STIMULATING HORMONE: 4.25 MLU/L
TRIGLYCERIDES: 76 MG/DL
WHITE BLOOD COUNT: 6.8 X 10-3/U

## 2018-09-27 RX ORDER — PANTOPRAZOLE SODIUM 40 MG/1
TABLET, DELAYED RELEASE ORAL
Qty: 90 TABLET | Refills: 1 | Status: SHIPPED | OUTPATIENT
Start: 2018-09-27 | End: 2019-03-23

## 2018-10-02 ENCOUNTER — OFFICE VISIT (OUTPATIENT)
Dept: FAMILY MEDICINE CLINIC | Facility: CLINIC | Age: 78
End: 2018-10-02
Payer: MEDICARE

## 2018-10-02 VITALS
OXYGEN SATURATION: 97 % | HEIGHT: 61.75 IN | DIASTOLIC BLOOD PRESSURE: 76 MMHG | WEIGHT: 213 LBS | SYSTOLIC BLOOD PRESSURE: 120 MMHG | RESPIRATION RATE: 16 BRPM | TEMPERATURE: 97 F | BODY MASS INDEX: 39.2 KG/M2 | HEART RATE: 59 BPM

## 2018-10-02 DIAGNOSIS — Z00.00 ENCOUNTER FOR ANNUAL HEALTH EXAMINATION: Primary | ICD-10-CM

## 2018-10-02 DIAGNOSIS — F32.A ANXIETY AND DEPRESSION: ICD-10-CM

## 2018-10-02 DIAGNOSIS — Z23 NEED FOR VACCINATION: ICD-10-CM

## 2018-10-02 DIAGNOSIS — E78.2 MIXED HYPERLIPIDEMIA: ICD-10-CM

## 2018-10-02 DIAGNOSIS — E03.9 HYPOTHYROIDISM, UNSPECIFIED TYPE: ICD-10-CM

## 2018-10-02 DIAGNOSIS — I25.10 CORONARY ARTERY DISEASE INVOLVING NATIVE HEART WITHOUT ANGINA PECTORIS, UNSPECIFIED VESSEL OR LESION TYPE: ICD-10-CM

## 2018-10-02 DIAGNOSIS — I10 ESSENTIAL HYPERTENSION: ICD-10-CM

## 2018-10-02 DIAGNOSIS — F41.9 ANXIETY AND DEPRESSION: ICD-10-CM

## 2018-10-02 DIAGNOSIS — E11.9 TYPE 2 DIABETES MELLITUS WITHOUT COMPLICATION, WITHOUT LONG-TERM CURRENT USE OF INSULIN (HCC): ICD-10-CM

## 2018-10-02 PROCEDURE — G0009 ADMIN PNEUMOCOCCAL VACCINE: HCPCS | Performed by: FAMILY MEDICINE

## 2018-10-02 PROCEDURE — 99213 OFFICE O/P EST LOW 20 MIN: CPT | Performed by: FAMILY MEDICINE

## 2018-10-02 PROCEDURE — G0439 PPPS, SUBSEQ VISIT: HCPCS | Performed by: FAMILY MEDICINE

## 2018-10-02 PROCEDURE — 90670 PCV13 VACCINE IM: CPT | Performed by: FAMILY MEDICINE

## 2018-10-02 RX ORDER — LORAZEPAM 0.5 MG/1
0.75 TABLET ORAL EVERY 6 HOURS PRN
Qty: 45 TABLET | Refills: 2 | Status: SHIPPED | OUTPATIENT
Start: 2018-10-02 | End: 2018-11-01

## 2018-10-02 NOTE — PATIENT INSTRUCTIONS
Consuelo Kwok's SCREENING SCHEDULE   Tests on this list are recommended by your physician but may not be covered, or covered at this frequency, by your insurer. Please check with your insurance carrier before scheduling to verify coverage.    PREVENTATI to patients who meet one of the following criteria:   • Men who are 73-68 years old and have smoked more than 100 cigarettes in their lifetime   • Anyone with a family history    Colorectal Cancer Screening  Covered up to Age 76     Colonoscopy Screen   Co reminders to display for this patient. Please get this Mammogram regularly   Immunizations      Influenza  Covered Annually No orders found for this or any previous visit.  Please get every year    Pneumococcal 13 (Prevnar)  Covered Once after 65 Orders santos has information from the [address] [place] [address] regarding Advance Directives.

## 2018-10-02 NOTE — PROGRESS NOTES
HPI:   Alia Marmolejo is a 69year old female who presents for a Medicare Subsequent Annual Wellness visit (Pt already had Initial Annual Wellness). Patient presents for recheck of her hypertension.  Pt has been following her diet as instructed, she d Coronary artery disease involving native heart without angina pectoris     Anxiety and depression      Last Cholesterol Labs:   Lab Results   Component Value Date    CHOLEST 90 08/16/2018    HDL 55 08/16/2018    LDL 20 08/16/2018    TRIG 76 08/16/2018 unspecified, Unspecified essential hypertension, and Visual impairment.     She  has a past surgical history that includes angioplasty (coronary) (); ; dual energy x-ray absorptiometry, body composition study, 1+ sites (2011); shoulder nevaeh Rub  Finger Rub Result:  Pass          Visual Acuity                           General Appearance:  Alert, cooperative, no distress, appears stated age   Head:  Normocephalic, without obvious abnormality, atraumatic   Neck: Supple, symmetrical, trachea mid unspecified type  Continue levothyroxine 300 mcg daily continue  - OFFICE/OUTPT VISIT,EST,LEVL III  - TSH+FREE T4; Future    6.  Coronary artery disease involving native heart without angina pectoris, unspecified vessel or lesion type  Plavix 75 mg daily  C Good    How do you maintain positive mental well-being?: Puzzles; Visiting Friends;Games; Visiting Family    If you are a male age 38-65 or a female age 47-67, do you take aspirin?: Yes    Have you had any immunizations at another office such as Influenza, H Lab or Procedure   Diabetes Screening      HbgA1C   Annually Lab Results   Component Value Date    A1C 5.5 08/16/2018    No flowsheet data found.     Fasting Blood Sugar (FSB)Annually Glucose (mg/dL)   Date Value   08/22/2018 92     GLUCOSE (mg/dL)   Date V get once after your 65th birthday    Pneumococcal 23 (Pneumovax)  Covered Once after 65 No vaccine history found Please get once after your 65th birthday    Hepatitis B for Moderate/High Risk No vaccine history found Medium/high risk factors:   End-stage r entered on: 5/8/2018   Last Dilated Eye Exam 5/8/2018     No flowsheet data found. COPD      Spirometry Testing Annually Spirometry date:  No flowsheet data found.          Template: SG ERWIN MEDICARE ANNUAL ASSESSMENT FEMALE [23911]

## 2018-10-07 PROBLEM — R29.898 LEG WEAKNESS, BILATERAL: Status: RESOLVED | Noted: 2017-08-08 | Resolved: 2018-10-07

## 2018-10-07 PROBLEM — Z23 NEED FOR VACCINATION: Status: RESOLVED | Noted: 2017-08-08 | Resolved: 2018-10-07

## 2018-11-15 ENCOUNTER — TELEPHONE (OUTPATIENT)
Dept: FAMILY MEDICINE CLINIC | Facility: CLINIC | Age: 78
End: 2018-11-15

## 2018-11-15 NOTE — TELEPHONE ENCOUNTER
Lm to Cb. Please verify what dose of Sertraline pt. Is on. We have sertraline 50 mg 1 1/2 tabs daily =75 mg total or [facility]  pharmacy ([address]) refill request came across as sertraline 50 mg  1 daily.

## 2018-11-15 NOTE — TELEPHONE ENCOUNTER
I spoke with patient, she is taking Sertraline 50 mg 1 and 1/2 tablets daily. Received 90 day supply on 10/2/2018, no need for refill.

## 2018-12-16 ENCOUNTER — PRIOR ORIGINAL RECORDS (OUTPATIENT)
Dept: OTHER | Age: 78
End: 2018-12-16

## 2018-12-16 ENCOUNTER — HOSPITAL ENCOUNTER (INPATIENT)
Facility: HOSPITAL | Age: 78
LOS: 1 days | Discharge: ANOTHER HOSPITAL | DRG: 390 | End: 2018-12-16
Attending: EMERGENCY MEDICINE | Admitting: HOSPITALIST
Payer: MEDICARE

## 2018-12-16 ENCOUNTER — APPOINTMENT (OUTPATIENT)
Dept: CT IMAGING | Facility: HOSPITAL | Age: 78
DRG: 390 | End: 2018-12-16
Attending: EMERGENCY MEDICINE
Payer: MEDICARE

## 2018-12-16 ENCOUNTER — APPOINTMENT (OUTPATIENT)
Dept: GENERAL RADIOLOGY | Facility: HOSPITAL | Age: 78
DRG: 390 | End: 2018-12-16
Attending: COLON & RECTAL SURGERY
Payer: MEDICARE

## 2018-12-16 VITALS
RESPIRATION RATE: 18 BRPM | OXYGEN SATURATION: 93 % | HEART RATE: 69 BPM | TEMPERATURE: 98 F | HEIGHT: 62 IN | DIASTOLIC BLOOD PRESSURE: 92 MMHG | WEIGHT: 210 LBS | SYSTOLIC BLOOD PRESSURE: 122 MMHG | BODY MASS INDEX: 38.64 KG/M2

## 2018-12-16 DIAGNOSIS — K56.609 SBO (SMALL BOWEL OBSTRUCTION) (HCC): Primary | ICD-10-CM

## 2018-12-16 PROCEDURE — 99223 1ST HOSP IP/OBS HIGH 75: CPT | Performed by: HOSPITALIST

## 2018-12-16 PROCEDURE — 99222 1ST HOSP IP/OBS MODERATE 55: CPT | Performed by: COLON & RECTAL SURGERY

## 2018-12-16 PROCEDURE — 0D9670Z DRAINAGE OF STOMACH WITH DRAINAGE DEVICE, VIA NATURAL OR ARTIFICIAL OPENING: ICD-10-PCS | Performed by: COLON & RECTAL SURGERY

## 2018-12-16 PROCEDURE — 74176 CT ABD & PELVIS W/O CONTRAST: CPT | Performed by: EMERGENCY MEDICINE

## 2018-12-16 PROCEDURE — 71045 X-RAY EXAM CHEST 1 VIEW: CPT | Performed by: COLON & RECTAL SURGERY

## 2018-12-16 RX ORDER — SODIUM CHLORIDE 9 MG/ML
125 INJECTION, SOLUTION INTRAVENOUS CONTINUOUS
Status: DISCONTINUED | OUTPATIENT
Start: 2018-12-16 | End: 2018-12-16

## 2018-12-16 RX ORDER — ENOXAPARIN SODIUM 100 MG/ML
40 INJECTION SUBCUTANEOUS DAILY
Status: DISCONTINUED | OUTPATIENT
Start: 2018-12-16 | End: 2018-12-16

## 2018-12-16 RX ORDER — SODIUM CHLORIDE 9 MG/ML
INJECTION, SOLUTION INTRAVENOUS CONTINUOUS
Status: DISCONTINUED | OUTPATIENT
Start: 2018-12-16 | End: 2018-12-16

## 2018-12-16 RX ORDER — MORPHINE SULFATE 4 MG/ML
1 INJECTION, SOLUTION INTRAMUSCULAR; INTRAVENOUS EVERY 4 HOURS PRN
Status: DISCONTINUED | OUTPATIENT
Start: 2018-12-16 | End: 2018-12-16

## 2018-12-16 RX ORDER — ONDANSETRON 2 MG/ML
4 INJECTION INTRAMUSCULAR; INTRAVENOUS EVERY 6 HOURS PRN
Status: DISCONTINUED | OUTPATIENT
Start: 2018-12-16 | End: 2018-12-16

## 2018-12-16 NOTE — ED INITIAL ASSESSMENT (HPI)
Pt presents to ed ambulatory with steady gait c/o generalized abdominal pain at a 5/10, pt states nausea but no vomiting, denies gu symptoms.  Pt is a&ox4, resps easy

## 2018-12-16 NOTE — ED PROVIDER NOTES
Patient Seen in: [facility] Emergency Department    History   Patient presents with:  Abdomen/Flank Pain (GI/)    Stated Complaint: abdominal pain    HPI    Kostas Nicole is a 72-year-old female presented to the emergency department for abdominal pain.   Sulema Barthel 0417]   BP (!) 177/63   Pulse 73   Resp 20   Temp 97.9 °F (36.6 °C)   Temp src Temporal   SpO2 95 %   O2 Device None (Room air)       Current:BP (!) 177/63   Pulse 61   Temp 97.9 °F (36.6 °C) (Temporal)   Resp 18   Ht 157.5 cm (5' 2\")   Wt 95.3 kg   SpO2 admitted for further evaluation  Admission disposition: 12/16/2018  5:19 AM                 Disposition and Plan     Clinical Impression:  SBO (small bowel obstruction) ([facility] [address]  (primary encounter diagnosis)    Disposition:  Admit  12/16/2018  5:19 am    Fol

## 2018-12-16 NOTE — PROGRESS NOTES
NURSING ADMISSION NOTE      Patient admitted via cart  From ER  Due to bowel obstruction. Alert and oriented . Safety precautions initiated. Bed in low position. Call light in reach. Rating abd.  Pain 6/10, prn medicine given, NPO, Ivfluids,  Denies n

## 2018-12-16 NOTE — CONSULTS
[facility]  Report of Consultation    Louie Kelsey Patient Status:  Inpatient    3/11/1940 MRN [id number]   [facility] 4NW-A Attending Johan Vanegas MD   Hosp Day # 0 PCP Nicole Blizzard, MD     Reason for Consultation:  Melanie Sanchez in L ant descend   •       x3   • CATH DRUG ELUTING STENT     • CATH PERCUTANEOUS  TRANSLUMINAL CORONARY ANGIOPLASTY     • COLONOSCOPY  2011   • DUAL ENERGY X-RAY ABSORPTIOMETRY, BODY COMPOSITION STUDY, 1+ SITES  2011   • OTHER SURGICAL Negative for pruritus and rash  Musculoskeletal:  Negative for bone/joint symptoms  Neurological:  Negative for gait disturbance  Psychiatric:  Negative for inappropriate interaction and psychiatric symptoms  Respiratory:  Negative for cough, dyspnea and w (HAB=51011)     COMPARISON:  [facility] , CT ENTEROGRAPHY ABD/PEL W CONTRAST SH(CPT=74177), 7/23/2014, 13:16. INDICATIONS:  abdominal pain     TECHNIQUE:  Unenhanced multislice CT scanning from above the kidneys to below the urinary bladder.   2D rendering hernia. BONES:  No bony lesion or fracture. Moderate to severe multilevel degenerative changes of the spine. PELVIC ORGANS:  Normal for age. LUNG BASES:  No visible pulmonary or pleural disease.       =====  CONCLUSION:       1.   Low grade small juana Lois, in my presence, and it is both accurate and complete.

## 2018-12-16 NOTE — PROGRESS NOTES
Dr. Carol Pugh recommends transfer to [facility] where previous surgeries were performed. Spoke with Dr. Ryan Allan, he has accepted patient and will arrange transfer.     Darrin Rush MD  [facility]

## 2018-12-16 NOTE — H&P
[facility]  History and Physical     Ashlie Agudelo Patient Status:  Emergency    3/11/1940 MRN [id number]   Location [address] [facility] Attending Wilfred Gold MD   Hosp Day # 0 PCP Eliu Jane MD     Chief Compla OTHER SURGICAL HISTORY      whipple   • OTHER SURGICAL HISTORY      2 stents   • SHOULDER SURG PROC UNLISTED      R shoulder       Social History:  reports that  has never smoked. she has never used smokeless tobacco. She reports that she drinks alcohol.  Lissy Hoskins acute distress. Alert and oriented x 3. HEENT: Normocephalic atraumatic. Moist mucous membranes. EOM-I. PERRLA. Anicteric. Neck: No lymphadenopathy. No JVD. No carotid bruits. Respiratory: Clear to auscultation bilaterally. No wheezes. No rhonchi.   Card

## 2018-12-16 NOTE — PROGRESS NOTES
NURSING DISCHARGE NOTE    Discharged Another hospital via Ambulance. Accompanied by Support staff  Belongings Taken by patient/family Patient transferred to [facility] via ambulance, NGT intact, IVF infusing. Roseanna Ruiz

## 2018-12-16 NOTE — PLAN OF CARE
Assumed care @ 0730. Patient c/o moderate pain all over the abdomen. Patient's abdomen soft, alix-distended, tender, bowel sounds hypoactive, no flatus. Patient kept NPO. Morphine given as needed, with moderate relief.  Patient c/o nausea, Zofran given, with

## 2018-12-16 NOTE — CM/SW NOTE
Jaime requested [facility] 12 noon transport to [address] [place] [address]. Await response.     Izzy Kang, 12/16/18, 10:31 AM

## 2019-02-01 ENCOUNTER — PRIOR ORIGINAL RECORDS (OUTPATIENT)
Dept: OTHER | Age: 79
End: 2019-02-01

## 2019-02-01 ENCOUNTER — MYAURORA ACCOUNT LINK (OUTPATIENT)
Dept: OTHER | Age: 79
End: 2019-02-01

## 2019-02-05 ENCOUNTER — APPOINTMENT (OUTPATIENT)
Dept: LAB | Age: 79
End: 2019-02-05
Attending: INTERNAL MEDICINE
Payer: MEDICARE

## 2019-02-05 ENCOUNTER — PRIOR ORIGINAL RECORDS (OUTPATIENT)
Dept: OTHER | Age: 79
End: 2019-02-05

## 2019-02-05 DIAGNOSIS — E11.9 TYPE 2 DIABETES MELLITUS WITHOUT COMPLICATION, WITHOUT LONG-TERM CURRENT USE OF INSULIN (HCC): ICD-10-CM

## 2019-02-05 DIAGNOSIS — I25.10 CORONARY ARTERY DISEASE INVOLVING NATIVE HEART WITHOUT ANGINA PECTORIS, UNSPECIFIED VESSEL OR LESION TYPE: ICD-10-CM

## 2019-02-05 DIAGNOSIS — E78.2 MIXED HYPERLIPIDEMIA: ICD-10-CM

## 2019-02-05 DIAGNOSIS — E03.9 HYPOTHYROIDISM, UNSPECIFIED TYPE: ICD-10-CM

## 2019-02-05 DIAGNOSIS — I10 ESSENTIAL HYPERTENSION: ICD-10-CM

## 2019-02-05 LAB
ALBUMIN SERPL-MCNC: 2.9 G/DL (ref 3.1–4.5)
ALBUMIN/GLOB SERPL: 0.8 {RATIO} (ref 1–2)
ALBUMIN: 3.1 G/DL
ALKALINE PHOSPHATATE(ALK PHOS): 112 IU/L
ALP LIVER SERPL-CCNC: 106 U/L (ref 55–142)
ALT SERPL-CCNC: 13 U/L (ref 14–54)
ANION GAP SERPL CALC-SCNC: 3 MMOL/L (ref 0–18)
AST SERPL-CCNC: 13 U/L (ref 15–41)
BILIRUB SERPL-MCNC: 0.3 MG/DL (ref 0.1–2)
BILIRUBIN TOTAL: 0.4 MG/DL
BUN BLD-MCNC: 16 MG/DL (ref 8–20)
BUN/CREAT SERPL: 13.2 (ref 10–20)
BUN: 24 MG/DL
CALCIUM BLD-MCNC: 7.6 MG/DL (ref 8.3–10.3)
CALCIUM: 7.6 MG/DL
CHLORIDE SERPL-SCNC: 111 MMOL/L (ref 101–111)
CHLORIDE: 113 MEQ/L
CHOLEST SMN-MCNC: 113 MG/DL (ref ?–200)
CO2 SERPL-SCNC: 29 MMOL/L (ref 22–32)
CREAT BLD-MCNC: 1.21 MG/DL (ref 0.55–1.02)
CREAT UR-SCNC: 231 MG/DL
CREATININE, SERUM: 1.12 MG/DL
EST. AVERAGE GLUCOSE BLD GHB EST-MCNC: 128 MG/DL (ref 68–126)
GLOBULIN PLAS-MCNC: 3.6 G/DL (ref 2.8–4.4)
GLOBULIN: 3.7 G/DL
GLUCOSE BLD-MCNC: 100 MG/DL (ref 70–99)
GLUCOSE: 151 MG/DL
HBA1C MFR BLD HPLC: 6.1 % (ref ?–5.7)
HDLC SERPL-MCNC: 61 MG/DL (ref 40–59)
HEMATOCRIT: 37.9 %
HEMOGLOBIN: 11.7 G/DL
LDLC SERPL CALC-MCNC: 36 MG/DL (ref ?–100)
M PROTEIN MFR SERPL ELPH: 6.5 G/DL (ref 6.4–8.2)
MICROALBUMIN UR-MCNC: 4.03 MG/DL
MICROALBUMIN/CREAT 24H UR-RTO: 17.4 UG/MG (ref ?–30)
NONHDLC SERPL-MCNC: 52 MG/DL (ref ?–130)
OSMOLALITY SERPL CALC.SUM OF ELEC: 297 MOSM/KG (ref 275–295)
PLATELETS: 218 K/UL
POTASSIUM SERPL-SCNC: 4.2 MMOL/L (ref 3.6–5.1)
POTASSIUM, SERUM: 4.7 MEQ/L
PROTEIN, TOTAL: 6.8 G/DL
RED BLOOD COUNT: 4.33 X 10-6/U
SGOT (AST): 14 IU/L
SGPT (ALT): 17 IU/L
SODIUM SERPL-SCNC: 143 MMOL/L (ref 136–144)
SODIUM: 143 MEQ/L
T4 FREE SERPL-MCNC: 0.5 NG/DL (ref 0.9–1.8)
TRIGL SERPL-MCNC: 80 MG/DL (ref 30–149)
TSI SER-ACNC: 49.2 MIU/ML (ref 0.35–5.5)
VLDLC SERPL CALC-MCNC: 16 MG/DL (ref 0–30)
WHITE BLOOD COUNT: 9.2 X 10-3/U

## 2019-02-05 PROCEDURE — 84439 ASSAY OF FREE THYROXINE: CPT

## 2019-02-05 PROCEDURE — 80061 LIPID PANEL: CPT

## 2019-02-05 PROCEDURE — 82043 UR ALBUMIN QUANTITATIVE: CPT

## 2019-02-05 PROCEDURE — 82570 ASSAY OF URINE CREATININE: CPT

## 2019-02-05 PROCEDURE — 36415 COLL VENOUS BLD VENIPUNCTURE: CPT

## 2019-02-05 PROCEDURE — 80053 COMPREHEN METABOLIC PANEL: CPT

## 2019-02-05 PROCEDURE — 83036 HEMOGLOBIN GLYCOSYLATED A1C: CPT

## 2019-02-05 PROCEDURE — 84443 ASSAY THYROID STIM HORMONE: CPT

## 2019-02-06 ENCOUNTER — PRIOR ORIGINAL RECORDS (OUTPATIENT)
Dept: OTHER | Age: 79
End: 2019-02-06

## 2019-02-06 ENCOUNTER — TELEPHONE (OUTPATIENT)
Dept: FAMILY MEDICINE CLINIC | Facility: CLINIC | Age: 79
End: 2019-02-06

## 2019-02-06 DIAGNOSIS — E03.9 HYPOTHYROIDISM, UNSPECIFIED TYPE: Primary | ICD-10-CM

## 2019-02-06 LAB
FREE T4: 0.5 MG/DL
THYROID STIMULATING HORMONE: 49.2 MLU/L

## 2019-02-07 ENCOUNTER — HOSPITAL ENCOUNTER (OUTPATIENT)
Dept: CV DIAGNOSTICS | Age: 79
Discharge: HOME | End: 2019-02-07
Attending: INTERNAL MEDICINE
Payer: MEDICARE

## 2019-02-07 DIAGNOSIS — I35.0 NONRHEUMATIC AORTIC VALVE STENOSIS: ICD-10-CM

## 2019-02-07 DIAGNOSIS — I25.10 CORONARY ARTERIOSCLEROSIS: ICD-10-CM

## 2019-02-08 ENCOUNTER — PRIOR ORIGINAL RECORDS (OUTPATIENT)
Dept: OTHER | Age: 79
End: 2019-02-08

## 2019-02-28 VITALS
DIASTOLIC BLOOD PRESSURE: 58 MMHG | HEIGHT: 62 IN | SYSTOLIC BLOOD PRESSURE: 136 MMHG | HEART RATE: 56 BPM | BODY MASS INDEX: 39.01 KG/M2 | WEIGHT: 212 LBS

## 2019-02-28 VITALS
HEIGHT: 62 IN | HEART RATE: 54 BPM | DIASTOLIC BLOOD PRESSURE: 60 MMHG | SYSTOLIC BLOOD PRESSURE: 108 MMHG | WEIGHT: 214 LBS | BODY MASS INDEX: 39.38 KG/M2

## 2019-02-28 VITALS
WEIGHT: 224 LBS | HEART RATE: 72 BPM | DIASTOLIC BLOOD PRESSURE: 70 MMHG | SYSTOLIC BLOOD PRESSURE: 138 MMHG | HEIGHT: 62 IN | BODY MASS INDEX: 41.22 KG/M2

## 2019-02-28 VITALS
BODY MASS INDEX: 38.28 KG/M2 | HEIGHT: 62 IN | HEART RATE: 58 BPM | DIASTOLIC BLOOD PRESSURE: 58 MMHG | WEIGHT: 208 LBS | SYSTOLIC BLOOD PRESSURE: 110 MMHG

## 2019-02-28 VITALS
BODY MASS INDEX: 40.67 KG/M2 | WEIGHT: 221 LBS | SYSTOLIC BLOOD PRESSURE: 118 MMHG | DIASTOLIC BLOOD PRESSURE: 70 MMHG | HEIGHT: 62 IN | HEART RATE: 62 BPM

## 2019-03-01 VITALS
WEIGHT: 224 LBS | BODY MASS INDEX: 41.22 KG/M2 | SYSTOLIC BLOOD PRESSURE: 128 MMHG | HEIGHT: 62 IN | DIASTOLIC BLOOD PRESSURE: 60 MMHG | HEART RATE: 60 BPM

## 2019-03-08 ENCOUNTER — APPOINTMENT (OUTPATIENT)
Dept: LAB | Age: 79
End: 2019-03-08
Attending: FAMILY MEDICINE
Payer: MEDICARE

## 2019-03-08 DIAGNOSIS — E03.9 HYPOTHYROIDISM, UNSPECIFIED TYPE: ICD-10-CM

## 2019-03-08 LAB
T4 FREE SERPL-MCNC: 1.7 NG/DL (ref 0.8–1.7)
TSI SER-ACNC: 0.24 MIU/ML (ref 0.36–3.74)

## 2019-03-08 PROCEDURE — 84443 ASSAY THYROID STIM HORMONE: CPT

## 2019-03-08 PROCEDURE — 84439 ASSAY OF FREE THYROXINE: CPT

## 2019-03-08 PROCEDURE — 36415 COLL VENOUS BLD VENIPUNCTURE: CPT

## 2019-03-11 ENCOUNTER — TELEPHONE (OUTPATIENT)
Dept: FAMILY MEDICINE CLINIC | Facility: CLINIC | Age: 79
End: 2019-03-11

## 2019-03-11 DIAGNOSIS — E03.9 HYPOTHYROIDISM, UNSPECIFIED TYPE: Primary | ICD-10-CM

## 2019-03-11 RX ORDER — LEVOTHYROXINE SODIUM 0.05 MG/1
TABLET ORAL
Qty: 30 TABLET | Refills: 1 | Status: SHIPPED | OUTPATIENT
Start: 2019-03-11 | End: 2019-04-03

## 2019-03-11 RX ORDER — LEVOTHYROXINE SODIUM 0.2 MG/1
TABLET ORAL
Qty: 30 TABLET | Refills: 1 | Status: SHIPPED | OUTPATIENT
Start: 2019-03-11 | End: 2019-04-03

## 2019-03-23 DIAGNOSIS — F41.9 ANXIETY AND DEPRESSION: ICD-10-CM

## 2019-03-23 DIAGNOSIS — F32.A ANXIETY AND DEPRESSION: ICD-10-CM

## 2019-03-25 RX ORDER — LISINOPRIL 20 MG/1
TABLET ORAL
Qty: 90 TABLET | Refills: 1 | Status: SHIPPED | OUTPATIENT
Start: 2019-03-25

## 2019-03-25 RX ORDER — PANTOPRAZOLE SODIUM 40 MG/1
TABLET, DELAYED RELEASE ORAL
Qty: 90 TABLET | Refills: 0 | Status: SHIPPED | OUTPATIENT
Start: 2019-03-25 | End: 2019-06-19

## 2019-04-03 DIAGNOSIS — E03.9 HYPOTHYROIDISM, UNSPECIFIED TYPE: ICD-10-CM

## 2019-04-03 RX ORDER — LEVOTHYROXINE SODIUM 0.2 MG/1
TABLET ORAL
Qty: 30 TABLET | Refills: 0 | Status: SHIPPED | OUTPATIENT
Start: 2019-04-03 | End: 2019-05-07

## 2019-04-03 RX ORDER — LEVOTHYROXINE SODIUM 0.05 MG/1
TABLET ORAL
Qty: 30 TABLET | Refills: 0 | Status: SHIPPED | OUTPATIENT
Start: 2019-04-03 | End: 2019-07-09

## 2019-04-03 NOTE — TELEPHONE ENCOUNTER
See below. Rx was sent already for her thyroid. She is not due for repeat labs on it until may but looks like she was due to see you in January. Return in about 3 months (around 1/2/2019) for Hypertension, Hypercholesterolemia, NIDDM, Hypothyroidism.    John De Leon

## 2019-04-03 NOTE — TELEPHONE ENCOUNTER
Please call pt for follow up Thyroid,Hyperlipidemia, Diabetes, HTN, Anxiety and Depression with Dr. Joselin Ortega.

## 2019-04-04 NOTE — TELEPHONE ENCOUNTER
She is overdue to come in for follow-up. Please have her schedule an appointment at her earliest convenience in the next 30 days.

## 2019-04-10 RX ORDER — LORAZEPAM 0.5 MG/1
TABLET ORAL
COMMUNITY

## 2019-04-10 RX ORDER — LEVOTHYROXINE SODIUM 0.2 MG/1
1 TABLET ORAL DAILY
COMMUNITY
Start: 2015-11-05 | End: 2020-05-19 | Stop reason: DRUGHIGH

## 2019-04-10 RX ORDER — CLOPIDOGREL BISULFATE 75 MG/1
1 TABLET ORAL DAILY
COMMUNITY
Start: 2018-12-28 | End: 2020-01-06 | Stop reason: SDUPTHER

## 2019-04-10 RX ORDER — ATORVASTATIN CALCIUM 10 MG/1
TABLET, FILM COATED ORAL
COMMUNITY
Start: 2017-01-05

## 2019-04-29 ENCOUNTER — APPOINTMENT (OUTPATIENT)
Dept: LAB | Age: 79
End: 2019-04-29
Attending: FAMILY MEDICINE
Payer: MEDICARE

## 2019-04-29 ENCOUNTER — OFFICE VISIT (OUTPATIENT)
Dept: FAMILY MEDICINE CLINIC | Facility: CLINIC | Age: 79
End: 2019-04-29
Payer: MEDICARE

## 2019-04-29 VITALS
SYSTOLIC BLOOD PRESSURE: 110 MMHG | HEIGHT: 62 IN | TEMPERATURE: 99 F | DIASTOLIC BLOOD PRESSURE: 66 MMHG | WEIGHT: 227 LBS | RESPIRATION RATE: 18 BRPM | HEART RATE: 58 BPM | BODY MASS INDEX: 41.77 KG/M2

## 2019-04-29 DIAGNOSIS — F41.9 ANXIETY AND DEPRESSION: ICD-10-CM

## 2019-04-29 DIAGNOSIS — E11.9 TYPE 2 DIABETES MELLITUS WITHOUT COMPLICATION, WITHOUT LONG-TERM CURRENT USE OF INSULIN (HCC): ICD-10-CM

## 2019-04-29 DIAGNOSIS — E78.2 MIXED HYPERLIPIDEMIA: Primary | ICD-10-CM

## 2019-04-29 DIAGNOSIS — M25.569 KNEE PAIN: ICD-10-CM

## 2019-04-29 DIAGNOSIS — E03.9 HYPOTHYROIDISM, UNSPECIFIED TYPE: ICD-10-CM

## 2019-04-29 DIAGNOSIS — M17.10 OSTEOARTHROSIS, LOCALIZED, PRIMARY, KNEE: ICD-10-CM

## 2019-04-29 DIAGNOSIS — D50.9 IRON DEFICIENCY ANEMIA, UNSPECIFIED IRON DEFICIENCY ANEMIA TYPE: ICD-10-CM

## 2019-04-29 DIAGNOSIS — I25.10 CORONARY ARTERY DISEASE INVOLVING NATIVE HEART WITHOUT ANGINA PECTORIS, UNSPECIFIED VESSEL OR LESION TYPE: ICD-10-CM

## 2019-04-29 DIAGNOSIS — I10 ESSENTIAL HYPERTENSION: ICD-10-CM

## 2019-04-29 DIAGNOSIS — F32.A ANXIETY AND DEPRESSION: ICD-10-CM

## 2019-04-29 PROBLEM — M17.0 OSTEOARTHRITIS OF BOTH KNEES: Status: ACTIVE | Noted: 2018-02-14

## 2019-04-29 PROBLEM — I35.0 AORTIC STENOSIS, MODERATE: Status: ACTIVE | Noted: 2018-12-17

## 2019-04-29 PROCEDURE — 36415 COLL VENOUS BLD VENIPUNCTURE: CPT

## 2019-04-29 PROCEDURE — 84443 ASSAY THYROID STIM HORMONE: CPT

## 2019-04-29 PROCEDURE — 99214 OFFICE O/P EST MOD 30 MIN: CPT | Performed by: FAMILY MEDICINE

## 2019-04-29 PROCEDURE — 84439 ASSAY OF FREE THYROXINE: CPT

## 2019-04-29 RX ORDER — ATORVASTATIN CALCIUM 10 MG/1
10 TABLET, FILM COATED ORAL NIGHTLY
Qty: 90 TABLET | Refills: 0 | Status: SHIPPED | OUTPATIENT
Start: 2019-04-29 | End: 2020-01-27

## 2019-04-29 RX ORDER — LORAZEPAM 0.5 MG/1
0.5 TABLET ORAL AS NEEDED
Qty: 30 TABLET | Refills: 2 | Status: SHIPPED | OUTPATIENT
Start: 2019-04-29 | End: 2020-01-28

## 2019-04-29 RX ORDER — LISINOPRIL 20 MG/1
20 TABLET ORAL DAILY
Qty: 30 TABLET | Refills: 6 | Status: SHIPPED | OUTPATIENT
Start: 2019-04-29 | End: 2020-06-12

## 2019-04-29 RX ORDER — LORAZEPAM 0.5 MG/1
0.5 TABLET ORAL AS NEEDED
Refills: 2 | COMMUNITY
Start: 2018-11-30 | End: 2019-04-29

## 2019-04-29 NOTE — PROGRESS NOTES
Elizabeth Chávez is a 67year old female. HPI:   Patient presents for recheck of her hypertension. Pt has been following her diet as instructed, she does not take medications presently, home BP monitoring in the range of 509'X systolic and 69'K diastolic. Take 75 mg by mouth daily. Disp:  Rfl: 2   aspirin EC 81 MG Oral Tab EC Take 81 mg by mouth daily.  Disp:  Rfl:       Past Medical History:   Diagnosis Date   • Acute bronchitis    • Anesthesia complication     patient woke up during her colonoscopy was not Future    2. Knee pain  - atorvastatin 10 MG Oral Tab; Take 1 tablet (10 mg total) by mouth nightly. Dispense: 90 tablet; Refill: 0  - CBC WITH DIFFERENTIAL WITH PLATELET; Future    3.  Osteoarthrosis, localized, primary, knee  - atorvastatin 10 MG Oral Ta

## 2019-04-30 ENCOUNTER — PATIENT MESSAGE (OUTPATIENT)
Dept: FAMILY MEDICINE CLINIC | Facility: CLINIC | Age: 79
End: 2019-04-30

## 2019-05-07 DIAGNOSIS — E03.9 HYPOTHYROIDISM, UNSPECIFIED TYPE: ICD-10-CM

## 2019-05-07 RX ORDER — LEVOTHYROXINE SODIUM 0.2 MG/1
TABLET ORAL
Qty: 30 TABLET | Refills: 3 | Status: SHIPPED | OUTPATIENT
Start: 2019-05-07 | End: 2019-07-09

## 2019-05-07 RX ORDER — LEVOTHYROXINE SODIUM 0.05 MG/1
TABLET ORAL
Qty: 30 TABLET | Refills: 3 | Status: SHIPPED | OUTPATIENT
Start: 2019-05-07 | End: 2019-06-10

## 2019-06-07 ENCOUNTER — TELEPHONE (OUTPATIENT)
Dept: FAMILY MEDICINE CLINIC | Facility: CLINIC | Age: 79
End: 2019-06-07

## 2019-06-10 ENCOUNTER — HOSPITAL ENCOUNTER (OUTPATIENT)
Dept: GENERAL RADIOLOGY | Age: 79
Discharge: HOME | End: 2019-06-10
Attending: FAMILY MEDICINE
Payer: MEDICARE

## 2019-06-10 ENCOUNTER — TELEPHONE (OUTPATIENT)
Dept: FAMILY MEDICINE CLINIC | Facility: CLINIC | Age: 79
End: 2019-06-10

## 2019-06-10 ENCOUNTER — OFFICE VISIT (OUTPATIENT)
Dept: FAMILY MEDICINE CLINIC | Facility: CLINIC | Age: 79
End: 2019-06-10
Payer: MEDICARE

## 2019-06-10 VITALS
RESPIRATION RATE: 18 BRPM | BODY MASS INDEX: 41.96 KG/M2 | HEART RATE: 82 BPM | DIASTOLIC BLOOD PRESSURE: 70 MMHG | WEIGHT: 228 LBS | SYSTOLIC BLOOD PRESSURE: 128 MMHG | TEMPERATURE: 97 F | HEIGHT: 62 IN | OXYGEN SATURATION: 99 %

## 2019-06-10 DIAGNOSIS — R05.9 COUGH: ICD-10-CM

## 2019-06-10 DIAGNOSIS — J18.9 PNEUMONIA OF RIGHT MIDDLE LOBE DUE TO INFECTIOUS ORGANISM: Primary | ICD-10-CM

## 2019-06-10 DIAGNOSIS — R05.9 COUGH: Primary | ICD-10-CM

## 2019-06-10 PROCEDURE — 71046 X-RAY EXAM CHEST 2 VIEWS: CPT | Performed by: FAMILY MEDICINE

## 2019-06-10 PROCEDURE — 99214 OFFICE O/P EST MOD 30 MIN: CPT | Performed by: FAMILY MEDICINE

## 2019-06-10 RX ORDER — AZITHROMYCIN 250 MG/1
TABLET, FILM COATED ORAL
Qty: 6 TABLET | Refills: 0 | Status: SHIPPED | OUTPATIENT
Start: 2019-06-10 | End: 2019-07-02 | Stop reason: ALTCHOICE

## 2019-06-10 RX ORDER — CEFUROXIME AXETIL 500 MG/1
500 TABLET ORAL 2 TIMES DAILY
Qty: 20 TABLET | Refills: 0 | Status: SHIPPED | OUTPATIENT
Start: 2019-06-10 | End: 2019-07-02

## 2019-06-10 NOTE — PROGRESS NOTES
Sophie Graham is a 67year old female. HPI:   Patient is a 15-year-old white female who gives a 3-day history of cough, low-grade fever.     Current Outpatient Medications:  Sertraline HCl 50 MG Oral Tab Take 1.5 tablets (75 mg total) by mouth once daily denies any unusual skin lesions or rashes  RESPIRATORY: denies shortness of breath with exertion  CARDIOVASCULAR: denies chest pain on exertion  GI: denies abdominal pain and denies heartburn  NEURO: denies headaches    EXAM:   /70 (BP Location: [place]

## 2019-06-10 NOTE — TELEPHONE ENCOUNTER
I spoke with pt. She has had a cough for the past 3-4 days. She does not feel short of breath but can not take a good deep breath. appt made for pt to see Dr. Yoly Hilliard today at 2:30.  Pt was advised if she develops any shortness of breath before her appt t

## 2019-06-10 NOTE — TELEPHONE ENCOUNTER
Dr. Sudhakar Bee would like pt to have a chest xray prior to her appt at 2:30 today. Pt notified. She would like to do this at [address]. I advised pt to [facility] central scheduling to schedule. Pt agreed to plan and verbalized understanding.

## 2019-06-10 NOTE — TELEPHONE ENCOUNTER
1. What are your symptoms? Cough (not from cold)  Pain in right side of chest  Feels like she can't take deep breath without pain      2. How long have you been having these symptoms? Since yesterday      3.  Have you done anything already to treat your s

## 2019-06-13 ENCOUNTER — TELEPHONE (OUTPATIENT)
Dept: FAMILY MEDICINE CLINIC | Facility: CLINIC | Age: 79
End: 2019-06-13

## 2019-06-13 NOTE — TELEPHONE ENCOUNTER
Pt states she feels improved today and denies any fever. Did discuss good handwashing and wearing a mask if she must visit her daughter in law. Pt states she will hold off visiting her for a few more days and will monitor symptoms.

## 2019-06-19 RX ORDER — PANTOPRAZOLE SODIUM 40 MG/1
TABLET, DELAYED RELEASE ORAL
Qty: 90 TABLET | Refills: 0 | Status: SHIPPED | OUTPATIENT
Start: 2019-06-19 | End: 2019-11-13

## 2019-06-26 ENCOUNTER — TELEPHONE (OUTPATIENT)
Dept: FAMILY MEDICINE CLINIC | Facility: CLINIC | Age: 79
End: 2019-06-26

## 2019-06-26 DIAGNOSIS — J18.9 PNEUMONIA OF RIGHT UPPER LOBE DUE TO INFECTIOUS ORGANISM: Primary | ICD-10-CM

## 2019-06-26 NOTE — TELEPHONE ENCOUNTER
Pt was seen in the office for a sick visit with Dr Leatha Patel on 6/10/19  She was diagnosed with Pneumonia. She had a first xray on that day also. Pt states that Dr Leatha Patel would want for her to complete a second xray. I see no order placed in Epic.   Please order xray

## 2019-06-28 ENCOUNTER — HOSPITAL ENCOUNTER (OUTPATIENT)
Dept: GENERAL RADIOLOGY | Age: 79
Discharge: HOME | End: 2019-06-28
Attending: FAMILY MEDICINE
Payer: MEDICARE

## 2019-06-28 DIAGNOSIS — J18.9 PNEUMONIA OF RIGHT UPPER LOBE DUE TO INFECTIOUS ORGANISM: ICD-10-CM

## 2019-06-28 PROCEDURE — 71046 X-RAY EXAM CHEST 2 VIEWS: CPT | Performed by: FAMILY MEDICINE

## 2019-06-28 RX ORDER — CEFDINIR 300 MG/1
300 CAPSULE ORAL 2 TIMES DAILY
Qty: 14 CAPSULE | Refills: 0 | Status: SHIPPED | OUTPATIENT
Start: 2019-06-28 | End: 2019-10-25 | Stop reason: ALTCHOICE

## 2019-07-01 ENCOUNTER — TELEPHONE (OUTPATIENT)
Dept: FAMILY MEDICINE CLINIC | Facility: CLINIC | Age: 79
End: 2019-07-01

## 2019-07-01 NOTE — TELEPHONE ENCOUNTER
Patient states that she is returning a call from [facility]. Heavy phone volume unable to transfer. Please advise at [phone]. Thank you.

## 2019-07-01 NOTE — TELEPHONE ENCOUNTER
1316 call to pt-sts \"yonatan wanted me to call with an update today. She called be Friday w chest xr results. \"  Those results and result notes reviewed. Pt confirms started [facility] Friday but has not noticed any improvement yet.  Confirms no new or worsening

## 2019-07-01 NOTE — TELEPHONE ENCOUNTER
Vika/PSR updated dr Ferny Mckinney re his schedule for tomorrow. He confirms 15 min appt is ok for chris-345 pm now available. Call to chris-offered 315 pm appt tomorrow w dr Ferny Mckinney. She readily agrees-appt scheduled.

## 2019-07-02 ENCOUNTER — OFFICE VISIT (OUTPATIENT)
Dept: FAMILY MEDICINE CLINIC | Facility: CLINIC | Age: 79
End: 2019-07-02
Payer: MEDICARE

## 2019-07-02 VITALS
RESPIRATION RATE: 25 BRPM | BODY MASS INDEX: 41.96 KG/M2 | WEIGHT: 228 LBS | SYSTOLIC BLOOD PRESSURE: 136 MMHG | HEIGHT: 62 IN | HEART RATE: 56 BPM | TEMPERATURE: 99 F | OXYGEN SATURATION: 92 % | DIASTOLIC BLOOD PRESSURE: 72 MMHG

## 2019-07-02 DIAGNOSIS — J18.9 PNEUMONIA OF RIGHT UPPER LOBE DUE TO INFECTIOUS ORGANISM: Primary | ICD-10-CM

## 2019-07-02 PROCEDURE — 99213 OFFICE O/P EST LOW 20 MIN: CPT | Performed by: FAMILY MEDICINE

## 2019-07-03 ENCOUNTER — TELEPHONE (OUTPATIENT)
Dept: FAMILY MEDICINE CLINIC | Facility: CLINIC | Age: 79
End: 2019-07-03

## 2019-07-03 NOTE — TELEPHONE ENCOUNTER
Patient states she is supposed to have another CXR on Friday and it has not been ordered yet. Please let her know once it is ordered. Thank you.

## 2019-07-03 NOTE — TELEPHONE ENCOUNTER
Call to pt's home reaches identified voice mail. Per hipaa consent-left vmm advising dr Bart Finch is out of ofc today/holiday tomorrow but returns Friday 7/5. Informed I will call her as soon as I can confirm info w dr Bart Finch Friday morning.

## 2019-07-03 NOTE — TELEPHONE ENCOUNTER
Record shows pt saw dr Afshin Reina yesterday. No info re repeat cxr.  **see pt's call info noted below and advise-thanks!

## 2019-07-05 NOTE — TELEPHONE ENCOUNTER
Call to pt-advised cxr order has been placed. Pt voices understanding, sts will schedule and do today.

## 2019-07-06 ENCOUNTER — HOSPITAL ENCOUNTER (OUTPATIENT)
Dept: GENERAL RADIOLOGY | Age: 79
Discharge: HOME | End: 2019-07-06
Attending: FAMILY MEDICINE
Payer: MEDICARE

## 2019-07-06 DIAGNOSIS — J18.9 PNEUMONIA OF RIGHT UPPER LOBE DUE TO INFECTIOUS ORGANISM: ICD-10-CM

## 2019-07-06 PROCEDURE — 71046 X-RAY EXAM CHEST 2 VIEWS: CPT | Performed by: FAMILY MEDICINE

## 2019-07-08 ENCOUNTER — TELEPHONE (OUTPATIENT)
Dept: FAMILY MEDICINE CLINIC | Facility: CLINIC | Age: 79
End: 2019-07-08

## 2019-07-08 DIAGNOSIS — R93.89 ABNORMAL FINDING ON CHEST XRAY: Primary | ICD-10-CM

## 2019-07-08 DIAGNOSIS — R05.3 PERSISTENT COUGH FOR 3 WEEKS OR LONGER: ICD-10-CM

## 2019-07-09 DIAGNOSIS — E03.9 HYPOTHYROIDISM, UNSPECIFIED TYPE: ICD-10-CM

## 2019-07-09 RX ORDER — LEVOTHYROXINE SODIUM 0.05 MG/1
TABLET ORAL
Qty: 30 TABLET | Refills: 3 | Status: SHIPPED | OUTPATIENT
Start: 2019-07-09 | End: 2019-10-01

## 2019-07-09 RX ORDER — LEVOTHYROXINE SODIUM 0.2 MG/1
TABLET ORAL
Qty: 30 TABLET | Refills: 3 | Status: SHIPPED | OUTPATIENT
Start: 2019-07-09 | End: 2019-10-01

## 2019-07-12 ENCOUNTER — HOSPITAL ENCOUNTER (OUTPATIENT)
Dept: CT IMAGING | Age: 79
Discharge: HOME | End: 2019-07-12
Attending: FAMILY MEDICINE
Payer: MEDICARE

## 2019-07-12 DIAGNOSIS — R93.89 ABNORMAL FINDING ON CHEST XRAY: ICD-10-CM

## 2019-07-12 DIAGNOSIS — R05.3 PERSISTENT COUGH FOR 3 WEEKS OR LONGER: ICD-10-CM

## 2019-07-12 PROCEDURE — 71260 CT THORAX DX C+: CPT | Performed by: FAMILY MEDICINE

## 2019-07-12 PROCEDURE — 82565 ASSAY OF CREATININE: CPT

## 2019-07-13 LAB — CREAT BLD-MCNC: 1.2 MG/DL (ref 0.55–1.02)

## 2019-07-23 ENCOUNTER — TELEPHONE (OUTPATIENT)
Dept: CARDIOLOGY | Age: 79
End: 2019-07-23

## 2019-07-24 ENCOUNTER — TELEPHONE (OUTPATIENT)
Dept: FAMILY MEDICINE CLINIC | Facility: CLINIC | Age: 79
End: 2019-07-24

## 2019-08-08 RX ORDER — PANTOPRAZOLE SODIUM 40 MG/1
TABLET, DELAYED RELEASE ORAL
COMMUNITY
Start: 2012-07-05

## 2019-08-08 RX ORDER — CEFDINIR 300 MG/1
300 CAPSULE ORAL
COMMUNITY
Start: 2019-06-28 | End: 2020-01-03 | Stop reason: ALTCHOICE

## 2019-08-09 ENCOUNTER — OFFICE VISIT (OUTPATIENT)
Dept: CARDIOLOGY | Age: 79
End: 2019-08-09

## 2019-08-09 VITALS
SYSTOLIC BLOOD PRESSURE: 130 MMHG | DIASTOLIC BLOOD PRESSURE: 58 MMHG | HEART RATE: 54 BPM | WEIGHT: 228 LBS | HEIGHT: 62 IN | BODY MASS INDEX: 41.96 KG/M2

## 2019-08-09 DIAGNOSIS — Z95.5 S/P PRIMARY ANGIOPLASTY WITH CORONARY STENT: ICD-10-CM

## 2019-08-09 DIAGNOSIS — I20.0 ANGINA PECTORIS, UNSTABLE (CMD): Primary | ICD-10-CM

## 2019-08-09 DIAGNOSIS — I65.23 ASYMPTOMATIC CAROTID ARTERY STENOSIS, BILATERAL: ICD-10-CM

## 2019-08-09 DIAGNOSIS — E78.00 PURE HYPERCHOLESTEROLEMIA: ICD-10-CM

## 2019-08-09 DIAGNOSIS — I35.0 NONRHEUMATIC AORTIC VALVE STENOSIS: ICD-10-CM

## 2019-08-09 DIAGNOSIS — E11.9 TYPE 2 DIABETES MELLITUS WITHOUT COMPLICATION, UNSPECIFIED WHETHER LONG TERM INSULIN USE (CMD): ICD-10-CM

## 2019-08-09 PROCEDURE — 99214 OFFICE O/P EST MOD 30 MIN: CPT | Performed by: INTERNAL MEDICINE

## 2019-08-09 RX ORDER — LEVOTHYROXINE SODIUM 0.05 MG/1
50 TABLET ORAL DAILY
COMMUNITY
End: 2020-01-03 | Stop reason: ALTCHOICE

## 2019-08-09 SDOH — HEALTH STABILITY: PHYSICAL HEALTH: ON AVERAGE, HOW MANY MINUTES DO YOU ENGAGE IN EXERCISE AT THIS LEVEL?: 0 MIN

## 2019-08-09 SDOH — HEALTH STABILITY: PHYSICAL HEALTH: ON AVERAGE, HOW MANY DAYS PER WEEK DO YOU ENGAGE IN MODERATE TO STRENUOUS EXERCISE (LIKE A BRISK WALK)?: 0 DAYS

## 2019-08-09 ASSESSMENT — ENCOUNTER SYMPTOMS
FEVER: 0
SUSPICIOUS LESIONS: 0
WEIGHT GAIN: 0
HEMATOCHEZIA: 0
ALLERGIC/IMMUNOLOGIC COMMENTS: NO NEW FOOD ALLERGIES
COUGH: 0
BRUISES/BLEEDS EASILY: 0
WEIGHT LOSS: 0
CHILLS: 0
HEMOPTYSIS: 0

## 2019-08-17 ENCOUNTER — MED REC SCAN ONLY (OUTPATIENT)
Dept: FAMILY MEDICINE CLINIC | Facility: CLINIC | Age: 79
End: 2019-08-17

## 2019-08-26 ENCOUNTER — TELEPHONE (OUTPATIENT)
Dept: CARDIOLOGY | Age: 79
End: 2019-08-26

## 2019-09-06 ENCOUNTER — APPOINTMENT (OUTPATIENT)
Dept: CARDIOLOGY | Age: 79
End: 2019-09-06

## 2019-10-01 DIAGNOSIS — E03.9 HYPOTHYROIDISM, UNSPECIFIED TYPE: ICD-10-CM

## 2019-10-01 RX ORDER — LEVOTHYROXINE SODIUM 0.2 MG/1
TABLET ORAL
Qty: 90 TABLET | Refills: 0 | Status: SHIPPED | OUTPATIENT
Start: 2019-10-01 | End: 2019-11-18

## 2019-10-01 RX ORDER — LEVOTHYROXINE SODIUM 0.05 MG/1
TABLET ORAL
Qty: 90 TABLET | Refills: 0 | Status: SHIPPED | OUTPATIENT
Start: 2019-10-01 | End: 2019-10-28 | Stop reason: DRUGHIGH

## 2019-10-25 ENCOUNTER — OFFICE VISIT (OUTPATIENT)
Dept: FAMILY MEDICINE CLINIC | Facility: CLINIC | Age: 79
End: 2019-10-25
Payer: MEDICARE

## 2019-10-25 ENCOUNTER — LAB ENCOUNTER (OUTPATIENT)
Dept: LAB | Age: 79
End: 2019-10-25
Attending: FAMILY MEDICINE
Payer: MEDICARE

## 2019-10-25 VITALS
SYSTOLIC BLOOD PRESSURE: 150 MMHG | TEMPERATURE: 98 F | WEIGHT: 232 LBS | DIASTOLIC BLOOD PRESSURE: 70 MMHG | BODY MASS INDEX: 43.8 KG/M2 | RESPIRATION RATE: 18 BRPM | HEART RATE: 82 BPM | HEIGHT: 61 IN

## 2019-10-25 DIAGNOSIS — E78.2 MIXED HYPERLIPIDEMIA: ICD-10-CM

## 2019-10-25 DIAGNOSIS — F32.A ANXIETY AND DEPRESSION: ICD-10-CM

## 2019-10-25 DIAGNOSIS — Z78.0 POSTMENOPAUSAL: ICD-10-CM

## 2019-10-25 DIAGNOSIS — I25.10 CORONARY ARTERY DISEASE INVOLVING NATIVE HEART WITHOUT ANGINA PECTORIS, UNSPECIFIED VESSEL OR LESION TYPE: ICD-10-CM

## 2019-10-25 DIAGNOSIS — Z12.31 ENCOUNTER FOR SCREENING MAMMOGRAM FOR MALIGNANT NEOPLASM OF BREAST: ICD-10-CM

## 2019-10-25 DIAGNOSIS — M62.81 MUSCLE WEAKNESS: ICD-10-CM

## 2019-10-25 DIAGNOSIS — E03.9 HYPOTHYROIDISM, UNSPECIFIED TYPE: ICD-10-CM

## 2019-10-25 DIAGNOSIS — F41.9 ANXIETY AND DEPRESSION: ICD-10-CM

## 2019-10-25 DIAGNOSIS — Z00.00 ENCOUNTER FOR ANNUAL HEALTH EXAMINATION: Primary | ICD-10-CM

## 2019-10-25 DIAGNOSIS — M25.569 KNEE PAIN: ICD-10-CM

## 2019-10-25 DIAGNOSIS — D50.9 IRON DEFICIENCY ANEMIA, UNSPECIFIED IRON DEFICIENCY ANEMIA TYPE: ICD-10-CM

## 2019-10-25 DIAGNOSIS — I10 ESSENTIAL HYPERTENSION: ICD-10-CM

## 2019-10-25 DIAGNOSIS — M17.10 OSTEOARTHROSIS, LOCALIZED, PRIMARY, KNEE: ICD-10-CM

## 2019-10-25 DIAGNOSIS — E11.9 TYPE 2 DIABETES MELLITUS WITHOUT COMPLICATION, WITHOUT LONG-TERM CURRENT USE OF INSULIN (HCC): ICD-10-CM

## 2019-10-25 PROCEDURE — 82570 ASSAY OF URINE CREATININE: CPT

## 2019-10-25 PROCEDURE — 84443 ASSAY THYROID STIM HORMONE: CPT

## 2019-10-25 PROCEDURE — 85025 COMPLETE CBC W/AUTO DIFF WBC: CPT

## 2019-10-25 PROCEDURE — 85652 RBC SED RATE AUTOMATED: CPT

## 2019-10-25 PROCEDURE — 82043 UR ALBUMIN QUANTITATIVE: CPT

## 2019-10-25 PROCEDURE — 80053 COMPREHEN METABOLIC PANEL: CPT

## 2019-10-25 PROCEDURE — 80061 LIPID PANEL: CPT

## 2019-10-25 PROCEDURE — 84439 ASSAY OF FREE THYROXINE: CPT

## 2019-10-25 PROCEDURE — G0439 PPPS, SUBSEQ VISIT: HCPCS | Performed by: FAMILY MEDICINE

## 2019-10-25 PROCEDURE — 83036 HEMOGLOBIN GLYCOSYLATED A1C: CPT

## 2019-10-25 PROCEDURE — 36415 COLL VENOUS BLD VENIPUNCTURE: CPT

## 2019-10-25 PROCEDURE — 99213 OFFICE O/P EST LOW 20 MIN: CPT | Performed by: FAMILY MEDICINE

## 2019-10-25 NOTE — PATIENT INSTRUCTIONS
No Kwok's SCREENING SCHEDULE   Tests on this list are recommended by your physician but may not be covered, or covered at this frequency, by your insurer. Please check with your insurance carrier before scheduling to verify coverage.    PREVENTATI visit.  Limited to patients who meet one of the following criteria:   • Men who are 73-68 years old and have smoked more than 100 cigarettes in their lifetime   • Anyone with a family history    Colorectal Cancer Screening  Covered up to Age 76     Colonosc preventive care reminders to display for this patient. Please get this Mammogram regularly   Immunizations      Influenza  Covered Annually No orders found for this or any previous visit.  Please get every year    Pneumococcal 13 (Prevnar)  Covered Once aft [place])  [email or web address] org  This link also has information from the [address] [place] [address] regarding Advance Directives.

## 2019-10-25 NOTE — PROGRESS NOTES
HPI:   Adams Camejo is a 67year old female who presents for a Medicare Subsequent Annual Wellness visit (Pt already had Initial Annual Wellness). Patient presents for recheck of her hypertension.  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     She  has a past surgical history that includes angioplasty ( 210 lb (95.3 kg)  10/02/18 : 213 lb (96.6 kg)    Medicare Hearing Assessment  (Required for AWV/SWV)    Hearing Screening    Time taken:  8/8/2017  8:36 AM  Screening Method:  Finger Rub  Finger Rub Result:  Pass          Visual Acuity lisinopril 20 mg 1 tablet daily  Continue metoprolol tartrate 25 mg half a tablet twice daily  - OFFICE/OUTPT VISIT,EST,LEVL III    5. Mixed hyperlipidemia  Continue atorvastatin 10 mg 1 tablet nightly  - OFFICE/OUTPT VISIT,EST,LEVL III    6.  Hypothyroidis does the patient maintain a good energy level?: Other(none due to leg pain)    How would you describe your daily physical activity?: Moderate    How would you describe your current health state?: Fair    How do you maintain positive mental well-being?: Vis Correct    What year is it?: Correct    Recall \"Ball\": Correct    Recall \"Flag\": Correct    Recall \"Tree\": Correct       This section provided for quick review of chart, separate sheet to patient  PREVENTATIVE SERVICES  INDICATIONS AND SCHEDULE Inter reminders to display for this patient.  Update Health Maintenance if applicable     Immunizations (Update Immunization Activity if applicable)     Influenza  Covered Annually 9/24/2018 Please get every year    Pneumococcal 13 (Prevnar)  Covered Once after 6 HgbA1C (%)   Date Value   10/25/2019 6.6 (H)    No flowsheet data found.     Creat/alb ratio  Annually      LDL  Annually LDL Cholesterol (mg/dL)   Date Value   10/25/2019 28     LDL CHOLESTROL (mg/dL)   Date Value   04/09/2014 84    No flowsheet data f

## 2019-10-28 ENCOUNTER — TELEPHONE (OUTPATIENT)
Dept: FAMILY MEDICINE CLINIC | Facility: CLINIC | Age: 79
End: 2019-10-28

## 2019-10-28 DIAGNOSIS — E03.9 HYPOTHYROIDISM, UNSPECIFIED TYPE: Primary | ICD-10-CM

## 2019-10-28 RX ORDER — LEVOTHYROXINE SODIUM 0.2 MG/1
200 TABLET ORAL
Qty: 90 TABLET | Refills: 0 | Status: SHIPPED | OUTPATIENT
Start: 2019-10-28 | End: 2020-05-21

## 2019-10-28 RX ORDER — ALPRAZOLAM 0.5 MG/1
0.5 TABLET ORAL NIGHTLY PRN
Qty: 2 TABLET | Refills: 0 | Status: SHIPPED | OUTPATIENT
Start: 2019-10-28 | End: 2019-11-18 | Stop reason: ALTCHOICE

## 2019-10-28 RX ORDER — LEVOTHYROXINE SODIUM 0.07 MG/1
75 TABLET ORAL
Qty: 90 TABLET | Refills: 0 | Status: SHIPPED | OUTPATIENT
Start: 2019-10-28 | End: 2020-01-23

## 2019-10-30 DIAGNOSIS — I10 ESSENTIAL HYPERTENSION: ICD-10-CM

## 2019-11-14 RX ORDER — PANTOPRAZOLE SODIUM 40 MG/1
TABLET, DELAYED RELEASE ORAL
Qty: 90 TABLET | Refills: 0 | Status: SHIPPED | OUTPATIENT
Start: 2019-11-14 | End: 2020-03-15

## 2019-11-18 ENCOUNTER — TELEPHONE (OUTPATIENT)
Dept: FAMILY MEDICINE CLINIC | Facility: CLINIC | Age: 79
End: 2019-11-18

## 2019-11-18 ENCOUNTER — OFFICE VISIT (OUTPATIENT)
Dept: FAMILY MEDICINE CLINIC | Facility: CLINIC | Age: 79
End: 2019-11-18
Payer: MEDICARE

## 2019-11-18 ENCOUNTER — MOBILE ENCOUNTER (OUTPATIENT)
Dept: FAMILY MEDICINE CLINIC | Facility: CLINIC | Age: 79
End: 2019-11-18

## 2019-11-18 ENCOUNTER — HOSPITAL ENCOUNTER (OUTPATIENT)
Dept: CT IMAGING | Age: 79
Discharge: HOME | End: 2019-11-18
Attending: NURSE PRACTITIONER
Payer: MEDICARE

## 2019-11-18 ENCOUNTER — HOSPITAL ENCOUNTER (OUTPATIENT)
Dept: ULTRASOUND IMAGING | Age: 79
Discharge: HOME | End: 2019-11-18
Attending: NURSE PRACTITIONER
Payer: MEDICARE

## 2019-11-18 VITALS
OXYGEN SATURATION: 92 % | TEMPERATURE: 99 F | RESPIRATION RATE: 12 BRPM | SYSTOLIC BLOOD PRESSURE: 126 MMHG | HEIGHT: 62 IN | WEIGHT: 235 LBS | DIASTOLIC BLOOD PRESSURE: 54 MMHG | BODY MASS INDEX: 43.24 KG/M2 | HEART RATE: 52 BPM

## 2019-11-18 DIAGNOSIS — M79.662 PAIN AND SWELLING OF LEFT LOWER LEG: Primary | ICD-10-CM

## 2019-11-18 DIAGNOSIS — R06.02 SHORTNESS OF BREATH: ICD-10-CM

## 2019-11-18 DIAGNOSIS — M79.89 PAIN AND SWELLING OF LEFT LOWER LEG: Primary | ICD-10-CM

## 2019-11-18 DIAGNOSIS — M79.662 PAIN AND SWELLING OF LEFT LOWER LEG: ICD-10-CM

## 2019-11-18 DIAGNOSIS — M79.89 PAIN AND SWELLING OF LEFT LOWER LEG: ICD-10-CM

## 2019-11-18 PROCEDURE — 99214 OFFICE O/P EST MOD 30 MIN: CPT | Performed by: NURSE PRACTITIONER

## 2019-11-18 PROCEDURE — 71275 CT ANGIOGRAPHY CHEST: CPT | Performed by: NURSE PRACTITIONER

## 2019-11-18 PROCEDURE — 93971 EXTREMITY STUDY: CPT | Performed by: NURSE PRACTITIONER

## 2019-11-18 NOTE — PROGRESS NOTES
Call to schedule stat US venous doppler. No prior auth required for stat ultrasounds. Spoke to Karen. Scheduled for 7:30pm 11/18/19 at [place] location. Pt informed at office visit.

## 2019-11-18 NOTE — TELEPHONE ENCOUNTER
I spoke with pt. She has left calf tenderness. She has had this for several weeks but it is getting worse over the past 2 weeks. She has no redness and it is not warm to the touch.  Pt is seeing Liya ART today at 1:30 PM.Pt is aware she may have to

## 2019-11-18 NOTE — PROGRESS NOTES
Delaney Haro is a 67year old female. HPI:   Patient presents today reporting a 1 month history of left leg swelling. She feels like the past few days it has been worse.  She does report pain in the leg- notes this more when she is walking and then some Bisulfate 75 MG Oral Tab Take 75 mg by mouth daily. 2   • aspirin EC 81 MG Oral Tab EC Take 81 mg by mouth daily.         Past Medical History:   Diagnosis Date   • Acute bronchitis    • Anesthesia complication     patient woke up during her colonoscopy wa PLAN:     Pain and swelling of left lower leg  (primary encounter diagnosis)  Shortness of breath    No orders of the defined types were placed in this encounter.       Meds & Refills for this Visit:  Requested Prescriptions      No prescriptions requested

## 2019-11-18 NOTE — TELEPHONE ENCOUNTER
1. What are your symptoms? Painful left leg  Swollen/tight    2. How long have you been having these symptoms? Gotten more intense this past week      3. Have you done anything already to treat your symptoms?    No      ADDITIONAL INFO:     Alisa isabel

## 2019-11-18 NOTE — PROGRESS NOTES
Call to schedule stat CTA chest. Spoke to [facility]. Scheduled for 7pm 11/18/19 at [place]. Pt informed at office visit.

## 2019-11-19 ENCOUNTER — TELEPHONE (OUTPATIENT)
Dept: FAMILY MEDICINE CLINIC | Facility: CLINIC | Age: 79
End: 2019-11-19

## 2019-11-19 DIAGNOSIS — R06.02 SHORTNESS OF BREATH: Primary | ICD-10-CM

## 2019-11-19 DIAGNOSIS — E87.79 OTHER HYPERVOLEMIA: ICD-10-CM

## 2019-11-19 RX ORDER — POTASSIUM CHLORIDE 750 MG/1
10 TABLET, FILM COATED, EXTENDED RELEASE ORAL DAILY
Qty: 30 TABLET | Refills: 0 | Status: SHIPPED | OUTPATIENT
Start: 2019-11-19 | End: 2019-12-07

## 2019-11-19 RX ORDER — FUROSEMIDE 20 MG/1
20 TABLET ORAL DAILY
Qty: 30 TABLET | Refills: 0 | Status: SHIPPED | OUTPATIENT
Start: 2019-11-19 | End: 2019-12-07

## 2019-11-19 NOTE — TELEPHONE ENCOUNTER
Spoke with pt. Regarding results of CTA chest and us left leg. Positive for DVT. CTA suggestive of fluid overload and enlarging mediastinal LAD. Advised to follow up with Dr. Salinas Jones regarding that and I will call in eliquis to her pharmacy.   She verbalized u

## 2019-11-19 NOTE — TELEPHONE ENCOUNTER
Patient told to call us 1st thing today. She spoke to [address] [facility] last night. I did review again with her and talk to . she is aware to have Fidel  Eliquis and get started on that.  Also in light of CTA-need to start Lasix 20 mg daily wit

## 2019-11-19 NOTE — TELEPHONE ENCOUNTER
Call from stevan/pharmacist [facility] no pharmacy in area has eliquis started pack. Requesting to dispense 5 mg tablets w same sig as starter pack. Dr Jerry alvarez plan. Requests they instruct pt in all dosing.    stevan victor

## 2019-11-26 ENCOUNTER — OFFICE VISIT (OUTPATIENT)
Dept: FAMILY MEDICINE CLINIC | Facility: CLINIC | Age: 79
End: 2019-11-26
Payer: MEDICARE

## 2019-11-26 ENCOUNTER — APPOINTMENT (OUTPATIENT)
Dept: LAB | Age: 79
End: 2019-11-26
Attending: NURSE PRACTITIONER
Payer: MEDICARE

## 2019-11-26 ENCOUNTER — HOSPITAL ENCOUNTER (OUTPATIENT)
Dept: ULTRASOUND IMAGING | Facility: HOSPITAL | Age: 79
Discharge: HOME | End: 2019-11-26
Attending: NURSE PRACTITIONER
Payer: MEDICARE

## 2019-11-26 VITALS
TEMPERATURE: 98 F | HEIGHT: 62 IN | DIASTOLIC BLOOD PRESSURE: 62 MMHG | OXYGEN SATURATION: 98 % | SYSTOLIC BLOOD PRESSURE: 148 MMHG | BODY MASS INDEX: 41.96 KG/M2 | RESPIRATION RATE: 20 BRPM | HEART RATE: 60 BPM | WEIGHT: 228 LBS

## 2019-11-26 DIAGNOSIS — I82.402 LEG DVT (DEEP VENOUS THROMBOEMBOLISM), ACUTE, LEFT (HCC): Primary | ICD-10-CM

## 2019-11-26 DIAGNOSIS — I50.9 HEART FAILURE, UNSPECIFIED HF CHRONICITY, UNSPECIFIED HEART FAILURE TYPE (HCC): ICD-10-CM

## 2019-11-26 DIAGNOSIS — R09.89 DECREASED PEDAL PULSES: ICD-10-CM

## 2019-11-26 DIAGNOSIS — R93.89 ABNORMAL CT OF THE CHEST: ICD-10-CM

## 2019-11-26 DIAGNOSIS — R19.7 DIARRHEA, UNSPECIFIED TYPE: ICD-10-CM

## 2019-11-26 PROCEDURE — 36415 COLL VENOUS BLD VENIPUNCTURE: CPT

## 2019-11-26 PROCEDURE — 93925 LOWER EXTREMITY STUDY: CPT | Performed by: NURSE PRACTITIONER

## 2019-11-26 PROCEDURE — 83880 ASSAY OF NATRIURETIC PEPTIDE: CPT

## 2019-11-26 PROCEDURE — 99215 OFFICE O/P EST HI 40 MIN: CPT | Performed by: NURSE PRACTITIONER

## 2019-11-26 PROCEDURE — 80048 BASIC METABOLIC PNL TOTAL CA: CPT

## 2019-11-26 NOTE — PROGRESS NOTES
Mandie Villalba is a 67year old female. HPI:   Patient presents today for a follow up on left lower extremity DVT and shortness of breath/fluid overload.  Patient was seen in the office 11/18 for leg pain and swelling and venous doppler was completed whic Oral Tab Take 1 tablet (5 mg total) by mouth 2 (two) times daily. 120 tablet 0   • furosemide (LASIX) 20 MG Oral Tab Take 1 tablet (20 mg total) by mouth daily.  30 tablet 0   • Potassium Chloride ER 10 MEQ Oral Tab CR Take 1 tablet (10 mEq total) by mouth Social History:  Social History    Tobacco Use      Smoking status: Never Smoker      Smokeless tobacco: Never Used    Alcohol use: Yes      Comment: rare    Drug use: No       REVIEW OF SYSTEMS:   GENERAL HEALTH: feels well otherwise- denies fever or Pro Beta Natriuretic Peptide [E]      Occult Blood Stool, Diagnostic      C. diff toxigenic PCR (OPT) [E]      OVA AND PARASITES [681]      Stool Culture w/Shigatoxin [E]      Meds & Refills for this Visit:  Requested Prescriptions     Signed Prescriptio anti-diarrheals until infectious process has been ruled out.  - OCCULT BLOOD, STOOL; Future  - CDIFFICILE TOXIGENIC PCR (OPT); Future  - OVA AND PARASITES;  Future  - STOOL CULTURE W/SHIGATOXIN; Future    Total appointment length: 45 minutes of which >50% o

## 2019-11-26 NOTE — PROGRESS NOTES
Call to schedule stat US arterial duplex lower extremity bilateral. Spoke to Francis Blancas. Scheduled for 11/26/19 11am. Pt informed at office visit.

## 2019-12-07 DIAGNOSIS — E87.79 OTHER HYPERVOLEMIA: ICD-10-CM

## 2019-12-07 DIAGNOSIS — R06.02 SHORTNESS OF BREATH: ICD-10-CM

## 2019-12-09 ENCOUNTER — HOSPITAL ENCOUNTER (OUTPATIENT)
Dept: CV DIAGNOSTICS | Age: 79
Discharge: HOME | End: 2019-12-09
Attending: NURSE PRACTITIONER
Payer: MEDICARE

## 2019-12-09 DIAGNOSIS — I50.9 HEART FAILURE, UNSPECIFIED HF CHRONICITY, UNSPECIFIED HEART FAILURE TYPE (HCC): ICD-10-CM

## 2019-12-09 PROCEDURE — 93306 TTE W/DOPPLER COMPLETE: CPT | Performed by: NURSE PRACTITIONER

## 2019-12-09 RX ORDER — APIXABAN 5 MG/1
TABLET, FILM COATED ORAL
Qty: 70 TABLET | Refills: 0 | OUTPATIENT
Start: 2019-12-09

## 2019-12-09 RX ORDER — POTASSIUM CHLORIDE 750 MG/1
TABLET, FILM COATED, EXTENDED RELEASE ORAL
Qty: 90 TABLET | Refills: 0 | Status: SHIPPED | OUTPATIENT
Start: 2019-12-09 | End: 2020-03-09

## 2019-12-09 RX ORDER — FUROSEMIDE 20 MG/1
TABLET ORAL
Qty: 90 TABLET | Refills: 0 | Status: SHIPPED | OUTPATIENT
Start: 2019-12-09 | End: 2020-02-27

## 2019-12-09 NOTE — TELEPHONE ENCOUNTER
Call to pharmacy. Spoke to [facility]. Vee stated refill from 11/26/19 is on file and will be processed and available for .

## 2019-12-09 NOTE — TELEPHONE ENCOUNTER
Please call pharmacy- I did send over an rx of Eliquis at her LOV- please see if they received this or we need a new order.  Thanks

## 2019-12-10 ENCOUNTER — TELEPHONE (OUTPATIENT)
Dept: CARDIOLOGY | Age: 79
End: 2019-12-10

## 2019-12-10 DIAGNOSIS — I82.402 LEG DVT (DEEP VENOUS THROMBOEMBOLISM), ACUTE, LEFT (HCC): Primary | ICD-10-CM

## 2019-12-12 DIAGNOSIS — E03.9 HYPOTHYROIDISM, UNSPECIFIED TYPE: ICD-10-CM

## 2019-12-12 RX ORDER — LEVOTHYROXINE SODIUM 0.05 MG/1
TABLET ORAL
Qty: 90 TABLET | Refills: 0 | Status: SHIPPED | OUTPATIENT
Start: 2019-12-12 | End: 2020-01-23 | Stop reason: DRUGHIGH

## 2019-12-16 RX ORDER — APIXABAN 5 MG/1
TABLET, FILM COATED ORAL
Qty: 70 TABLET | Refills: 0 | Status: SHIPPED | OUTPATIENT
Start: 2019-12-16 | End: 2020-02-07

## 2019-12-16 NOTE — TELEPHONE ENCOUNTER
Not protocol medication. LOV :11/26/19 DVT follow up with KAREN Bundy   Last labs done :11/26/19  Next appointment :2/20/20  Please see pending medication. Refill if appropriate.    Last refill:    Date:11/18/19  Amount :70 tablets no refills   Medication: el

## 2019-12-30 ENCOUNTER — APPOINTMENT (OUTPATIENT)
Dept: GENERAL RADIOLOGY | Facility: HOSPITAL | Age: 79
End: 2019-12-30
Attending: EMERGENCY MEDICINE
Payer: MEDICARE

## 2019-12-30 ENCOUNTER — HOSPITAL ENCOUNTER (EMERGENCY)
Facility: HOSPITAL | Age: 79
Discharge: HOME | End: 2019-12-30
Attending: EMERGENCY MEDICINE
Payer: MEDICARE

## 2019-12-30 ENCOUNTER — APPOINTMENT (OUTPATIENT)
Dept: ULTRASOUND IMAGING | Facility: HOSPITAL | Age: 79
End: 2019-12-30
Attending: EMERGENCY MEDICINE
Payer: MEDICARE

## 2019-12-30 ENCOUNTER — TELEPHONE (OUTPATIENT)
Dept: FAMILY MEDICINE CLINIC | Facility: CLINIC | Age: 79
End: 2019-12-30

## 2019-12-30 VITALS
WEIGHT: 220 LBS | HEIGHT: 62 IN | TEMPERATURE: 98 F | HEART RATE: 77 BPM | OXYGEN SATURATION: 94 % | BODY MASS INDEX: 40.48 KG/M2 | DIASTOLIC BLOOD PRESSURE: 75 MMHG | SYSTOLIC BLOOD PRESSURE: 174 MMHG | RESPIRATION RATE: 16 BRPM

## 2019-12-30 DIAGNOSIS — M79.602 LEFT ARM PAIN: Primary | ICD-10-CM

## 2019-12-30 LAB
ALBUMIN SERPL-MCNC: 2.7 G/DL (ref 3.4–5)
ALBUMIN/GLOB SERPL: 0.7 {RATIO} (ref 1–2)
ALP LIVER SERPL-CCNC: 91 U/L (ref 55–142)
ALT SERPL-CCNC: 11 U/L (ref 13–56)
ANION GAP SERPL CALC-SCNC: 4 MMOL/L (ref 0–18)
APTT PPP: 27.4 SECONDS (ref 25.4–36.1)
AST SERPL-CCNC: 15 U/L (ref 15–37)
ATRIAL RATE: 86 BPM
BASOPHILS # BLD AUTO: 0.05 X10(3) UL (ref 0–0.2)
BASOPHILS NFR BLD AUTO: 0.8 %
BILIRUB SERPL-MCNC: 0.2 MG/DL (ref 0.1–2)
BUN BLD-MCNC: 25 MG/DL (ref 7–18)
BUN/CREAT SERPL: 18.9 (ref 10–20)
CALCIUM BLD-MCNC: 7.7 MG/DL (ref 8.5–10.1)
CHLORIDE SERPL-SCNC: 116 MMOL/L (ref 98–112)
CHOLEST SMN-MCNC: 102 MG/DL (ref ?–200)
CO2 SERPL-SCNC: 25 MMOL/L (ref 21–32)
CREAT BLD-MCNC: 1.32 MG/DL (ref 0.55–1.02)
D-DIMER: 0.54 UG/ML FEU (ref ?–0.78)
DEPRECATED RDW RBC AUTO: 54.7 FL (ref 35.1–46.3)
EOSINOPHIL # BLD AUTO: 0.28 X10(3) UL (ref 0–0.7)
EOSINOPHIL NFR BLD AUTO: 4.2 %
ERYTHROCYTE [DISTWIDTH] IN BLOOD BY AUTOMATED COUNT: 17.2 % (ref 11–15)
GLOBULIN PLAS-MCNC: 3.9 G/DL (ref 2.8–4.4)
GLUCOSE BLD-MCNC: 101 MG/DL (ref 70–99)
HCT VFR BLD AUTO: 37.1 % (ref 35–48)
HDLC SERPL-MCNC: 45 MG/DL (ref 40–59)
HGB BLD-MCNC: 11.1 G/DL (ref 12–16)
IMM GRANULOCYTES # BLD AUTO: 0.01 X10(3) UL (ref 0–1)
IMM GRANULOCYTES NFR BLD: 0.2 %
INR BLD: 1.15 (ref 0.9–1.1)
LDLC SERPL CALC-MCNC: 33 MG/DL (ref ?–100)
LYMPHOCYTES # BLD AUTO: 1.02 X10(3) UL (ref 1–4)
LYMPHOCYTES NFR BLD AUTO: 15.4 %
M PROTEIN MFR SERPL ELPH: 6.6 G/DL (ref 6.4–8.2)
MCH RBC QN AUTO: 26.2 PG (ref 26–34)
MCHC RBC AUTO-ENTMCNC: 29.9 G/DL (ref 31–37)
MCV RBC AUTO: 87.7 FL (ref 80–100)
MONOCYTES # BLD AUTO: 0.44 X10(3) UL (ref 0.1–1)
MONOCYTES NFR BLD AUTO: 6.6 %
NEUTROPHILS # BLD AUTO: 4.83 X10 (3) UL (ref 1.5–7.7)
NEUTROPHILS # BLD AUTO: 4.83 X10(3) UL (ref 1.5–7.7)
NEUTROPHILS NFR BLD AUTO: 72.8 %
NONHDLC SERPL-MCNC: 57 MG/DL (ref ?–130)
OSMOLALITY SERPL CALC.SUM OF ELEC: 305 MOSM/KG (ref 275–295)
P AXIS: 72 DEGREES
P-R INTERVAL: 166 MS
PLATELET # BLD AUTO: 257 10(3)UL (ref 150–450)
POTASSIUM SERPL-SCNC: 4.6 MMOL/L (ref 3.5–5.1)
PSA SERPL DL<=0.01 NG/ML-MCNC: 15.2 SECONDS (ref 12.5–14.7)
Q-T INTERVAL: 412 MS
QRS DURATION: 132 MS
QTC CALCULATION (BEZET): 493 MS
R AXIS: -58 DEGREES
RBC # BLD AUTO: 4.23 X10(6)UL (ref 3.8–5.3)
SODIUM SERPL-SCNC: 145 MMOL/L (ref 136–145)
T AXIS: 19 DEGREES
TRIGL SERPL-MCNC: 122 MG/DL (ref 30–149)
TROPONIN I SERPL-MCNC: <0.045 NG/ML (ref ?–0.04)
VENTRICULAR RATE: 86 BPM
VLDLC SERPL CALC-MCNC: 24 MG/DL (ref 0–30)
WBC # BLD AUTO: 6.6 X10(3) UL (ref 4–11)

## 2019-12-30 PROCEDURE — 85379 FIBRIN DEGRADATION QUANT: CPT | Performed by: EMERGENCY MEDICINE

## 2019-12-30 PROCEDURE — 84484 ASSAY OF TROPONIN QUANT: CPT | Performed by: EMERGENCY MEDICINE

## 2019-12-30 PROCEDURE — 93005 ELECTROCARDIOGRAM TRACING: CPT

## 2019-12-30 PROCEDURE — 93010 ELECTROCARDIOGRAM REPORT: CPT

## 2019-12-30 PROCEDURE — 99285 EMERGENCY DEPT VISIT HI MDM: CPT

## 2019-12-30 PROCEDURE — 85025 COMPLETE CBC W/AUTO DIFF WBC: CPT | Performed by: EMERGENCY MEDICINE

## 2019-12-30 PROCEDURE — 36415 COLL VENOUS BLD VENIPUNCTURE: CPT

## 2019-12-30 PROCEDURE — 80053 COMPREHEN METABOLIC PANEL: CPT | Performed by: EMERGENCY MEDICINE

## 2019-12-30 PROCEDURE — 71045 X-RAY EXAM CHEST 1 VIEW: CPT | Performed by: EMERGENCY MEDICINE

## 2019-12-30 PROCEDURE — 85730 THROMBOPLASTIN TIME PARTIAL: CPT | Performed by: EMERGENCY MEDICINE

## 2019-12-30 PROCEDURE — 85610 PROTHROMBIN TIME: CPT | Performed by: EMERGENCY MEDICINE

## 2019-12-30 PROCEDURE — 93971 EXTREMITY STUDY: CPT | Performed by: EMERGENCY MEDICINE

## 2019-12-30 PROCEDURE — 80061 LIPID PANEL: CPT | Performed by: EMERGENCY MEDICINE

## 2019-12-30 NOTE — TELEPHONE ENCOUNTER
Pt states that she has blood clots in her legs and was given blood thinners to help with this and she states she still isn't feeling better. States that she is having pain in her left arm. Transferred live to triage.

## 2019-12-30 NOTE — TELEPHONE ENCOUNTER
Discussed with Dr. Radha Garcia  Pt need to be eval in ER  Pt advised   With verbalized understanding

## 2019-12-30 NOTE — TELEPHONE ENCOUNTER
Stated left leg pain is not improving   \"Hard to walk\"   Denies increased swelling  No redness   Left leg DVT dx 11/26/19   On Eliquis    Hx of CHF     Now also reported L arm pain for a week   Denies swelling, redness, not warm as the other arm    Denie

## 2019-12-30 NOTE — ED PROVIDER NOTES
Patient Seen in: [facility] Emergency Department      History   Patient presents with:  Deep Vein Thrombosis    Stated Complaint: + DVT left leg, now left arm discomfort wants to R/O DVT    HPI    Patient is a pleasant 64-year-old female, presenting Systems    Positive for stated complaint: + DVT left leg, now left arm discomfort wants to R/O DVT  Other systems are as noted in HPI. Constitutional and vital signs reviewed. All other systems reviewed and negative except as noted above.     Physical components:    PT 15.2 (*)     INR 1.15 (*)     All other components within normal limits   CBC W/ DIFFERENTIAL - Abnormal; Notable for the following components:    HGB 11.1 (*)     MCHC 29.9 (*)     RDW 17.2 (*)     RDW-SD 54.7 (*)     All other component contralateral Subclavian and Jugular. PATIENT STATED HISTORY: (As transcribed by Technologist)     FINDINGS:  EXTREMITY:  Left arm THROMBI:  None visible.  COMPRESSION:  Normal compressibility, phasicity, and augmentation of the subclavian, jugular, axilla care instructions reviewed. Discharge and close outpatient follow-up was recommended. Additional evaluation and intervention may be required, depending on the patient's clinical course.     Patient was invited to return for reevaluation of any problems, w

## 2019-12-30 NOTE — ED INITIAL ASSESSMENT (HPI)
66 y/o female to ED with c/o of +DVT x2 to left lower extremity. Patient was placed on eliquis. Approx x1 week ago patient started developing intermittent pain that starts from left shoulder radiating down to her forearm.  Patient was instructed to come to

## 2020-01-03 ENCOUNTER — APPOINTMENT (OUTPATIENT)
Dept: CARDIOLOGY | Age: 80
End: 2020-01-03

## 2020-01-06 ENCOUNTER — TELEPHONE (OUTPATIENT)
Dept: CARDIOLOGY | Age: 80
End: 2020-01-06

## 2020-01-07 RX ORDER — CLOPIDOGREL BISULFATE 75 MG/1
TABLET ORAL
Qty: 90 TABLET | Refills: 3 | Status: SHIPPED | OUTPATIENT
Start: 2020-01-07 | End: 2021-01-11

## 2020-01-07 RX ORDER — CLOPIDOGREL BISULFATE 75 MG/1
75 TABLET ORAL DAILY
OUTPATIENT
Start: 2020-01-07

## 2020-01-10 ENCOUNTER — OFFICE VISIT (OUTPATIENT)
Dept: CARDIOLOGY | Age: 80
End: 2020-01-10

## 2020-01-10 VITALS
DIASTOLIC BLOOD PRESSURE: 56 MMHG | HEART RATE: 56 BPM | SYSTOLIC BLOOD PRESSURE: 126 MMHG | WEIGHT: 228 LBS | BODY MASS INDEX: 41.96 KG/M2 | HEIGHT: 62 IN

## 2020-01-10 DIAGNOSIS — Z95.5 S/P PRIMARY ANGIOPLASTY WITH CORONARY STENT: ICD-10-CM

## 2020-01-10 DIAGNOSIS — I65.23 ASYMPTOMATIC CAROTID ARTERY STENOSIS, BILATERAL: ICD-10-CM

## 2020-01-10 DIAGNOSIS — I35.0 NONRHEUMATIC AORTIC VALVE STENOSIS: Primary | ICD-10-CM

## 2020-01-10 DIAGNOSIS — E78.00 PURE HYPERCHOLESTEROLEMIA: ICD-10-CM

## 2020-01-10 PROCEDURE — 99214 OFFICE O/P EST MOD 30 MIN: CPT | Performed by: INTERNAL MEDICINE

## 2020-01-10 RX ORDER — POTASSIUM CHLORIDE 750 MG/1
TABLET, FILM COATED, EXTENDED RELEASE ORAL
COMMUNITY
Start: 2019-12-09

## 2020-01-10 RX ORDER — FUROSEMIDE 20 MG/1
TABLET ORAL
COMMUNITY
Start: 2019-12-09

## 2020-01-10 RX ORDER — APIXABAN 5 MG/1
5 TABLET, FILM COATED ORAL 2 TIMES DAILY
Refills: 0 | COMMUNITY
Start: 2019-12-15

## 2020-01-10 RX ORDER — LEVOTHYROXINE SODIUM 0.07 MG/1
75 TABLET ORAL
COMMUNITY
Start: 2019-10-28 | End: 2020-09-28 | Stop reason: SDUPTHER

## 2020-01-10 SDOH — HEALTH STABILITY: PHYSICAL HEALTH: ON AVERAGE, HOW MANY DAYS PER WEEK DO YOU ENGAGE IN MODERATE TO STRENUOUS EXERCISE (LIKE A BRISK WALK)?: 0 DAYS

## 2020-01-10 SDOH — SOCIAL STABILITY: SOCIAL NETWORK: ARE YOU MARRIED, WIDOWED, DIVORCED, SEPARATED, NEVER MARRIED, OR LIVING WITH A PARTNER?: MARRIED

## 2020-01-10 SDOH — HEALTH STABILITY: PHYSICAL HEALTH: ON AVERAGE, HOW MANY MINUTES DO YOU ENGAGE IN EXERCISE AT THIS LEVEL?: 0 MIN

## 2020-01-10 ASSESSMENT — ENCOUNTER SYMPTOMS
COUGH: 0
WEIGHT LOSS: 0
ALLERGIC/IMMUNOLOGIC COMMENTS: NO NEW FOOD ALLERGIES
BRUISES/BLEEDS EASILY: 0
HEMATOCHEZIA: 0
WEIGHT GAIN: 0
HEMOPTYSIS: 0
SUSPICIOUS LESIONS: 0
FEVER: 0
CHILLS: 0

## 2020-01-10 ASSESSMENT — PATIENT HEALTH QUESTIONNAIRE - PHQ9: 2. FEELING DOWN, DEPRESSED OR HOPELESS: NOT AT ALL

## 2020-01-22 DIAGNOSIS — E03.9 HYPOTHYROIDISM, UNSPECIFIED TYPE: ICD-10-CM

## 2020-01-23 RX ORDER — LEVOTHYROXINE SODIUM 0.07 MG/1
75 TABLET ORAL
Qty: 90 TABLET | Refills: 0 | Status: SHIPPED | OUTPATIENT
Start: 2020-01-23 | End: 2020-03-05

## 2020-01-26 DIAGNOSIS — E78.2 MIXED HYPERLIPIDEMIA: ICD-10-CM

## 2020-01-26 DIAGNOSIS — M17.10 OSTEOARTHROSIS, LOCALIZED, PRIMARY, KNEE: ICD-10-CM

## 2020-01-26 DIAGNOSIS — M25.569 KNEE PAIN: ICD-10-CM

## 2020-01-27 ENCOUNTER — TELEPHONE (OUTPATIENT)
Dept: FAMILY MEDICINE CLINIC | Facility: CLINIC | Age: 80
End: 2020-01-27

## 2020-01-27 RX ORDER — ATORVASTATIN CALCIUM 10 MG/1
TABLET, FILM COATED ORAL
Qty: 90 TABLET | Refills: 0 | Status: SHIPPED | OUTPATIENT
Start: 2020-01-27 | End: 2020-04-19

## 2020-01-27 NOTE — TELEPHONE ENCOUNTER
KIM:    I called and spoke with Freya Wong. Dr. Nicole Moreira said she needs a total left knee replacement.  She told Dr. Nicole Moreira she will not schedule any surgery until you tell her it is ok because you know her the best. She has a follow up left venous doppler in 1 week

## 2020-01-27 NOTE — TELEPHONE ENCOUNTER
Patient needs to speak to a nurse regarding her knee replacement. She wants to make sure that Dr. Madeleine Brunner is aware of what is going on and that he is OK with her having the surgery. Patient requesting to speak with [facility].

## 2020-01-28 DIAGNOSIS — F32.A ANXIETY AND DEPRESSION: ICD-10-CM

## 2020-01-28 DIAGNOSIS — F41.9 ANXIETY AND DEPRESSION: ICD-10-CM

## 2020-01-28 RX ORDER — LORAZEPAM 0.5 MG/1
TABLET ORAL
Qty: 30 TABLET | Refills: 0 | Status: SHIPPED | OUTPATIENT
Start: 2020-01-28 | End: 2020-04-01

## 2020-02-03 ENCOUNTER — HOSPITAL ENCOUNTER (OUTPATIENT)
Dept: ULTRASOUND IMAGING | Age: 80
Discharge: HOME | End: 2020-02-03
Attending: NURSE PRACTITIONER
Payer: MEDICARE

## 2020-02-03 DIAGNOSIS — I82.402 LEG DVT (DEEP VENOUS THROMBOEMBOLISM), ACUTE, LEFT (HCC): ICD-10-CM

## 2020-02-03 PROCEDURE — 93971 EXTREMITY STUDY: CPT | Performed by: NURSE PRACTITIONER

## 2020-02-07 RX ORDER — APIXABAN 5 MG/1
TABLET, FILM COATED ORAL
Qty: 60 TABLET | Refills: 1 | Status: SHIPPED | OUTPATIENT
Start: 2020-02-07 | End: 2020-04-15

## 2020-02-07 NOTE — TELEPHONE ENCOUNTER
Next appt: 2/20/2020 to discuss eliqualyssa Saraviaquis  Last refill: 12/16/19 70 tabs with sig \"please see attached for detailed directions\"    Please advise correct sig and refill if appropriate. Thank you.

## 2020-02-10 ENCOUNTER — TELEPHONE (OUTPATIENT)
Dept: FAMILY MEDICINE CLINIC | Facility: CLINIC | Age: 80
End: 2020-02-10

## 2020-02-10 DIAGNOSIS — Z01.818 PRE-OPERATIVE CLEARANCE: ICD-10-CM

## 2020-02-10 DIAGNOSIS — E11.9 TYPE 2 DIABETES MELLITUS WITHOUT COMPLICATION, WITHOUT LONG-TERM CURRENT USE OF INSULIN (HCC): Primary | ICD-10-CM

## 2020-02-10 DIAGNOSIS — I25.10 CORONARY ARTERY DISEASE INVOLVING NATIVE HEART WITHOUT ANGINA PECTORIS, UNSPECIFIED VESSEL OR LESION TYPE: ICD-10-CM

## 2020-02-10 DIAGNOSIS — I82.402 LEG DVT (DEEP VENOUS THROMBOEMBOLISM), ACUTE, LEFT (HCC): ICD-10-CM

## 2020-02-10 NOTE — TELEPHONE ENCOUNTER
Pt has planned Sx 3/13 by Dr. Oneil Espinoza   Pt has OV with Dr. Yuridia Baeza for DVT f/u 2/20   She is wondering if that can be discussed in one visit?  ( I told her, not usually)     I told her that I will confirm with T4 if any preop paperwork received from Sx     Per T4-Saundra, nothing up to date  Will coordinate with Sx  Will update pt then

## 2020-02-10 NOTE — TELEPHONE ENCOUNTER
Called to Dr Sofia Clarke office and lm for [facility] (surgery scheduler) requesting information about pt's upcoming surgery, who with, what type, where to be done, what date and what testing needs to be completed. Will wait for cb.

## 2020-02-11 NOTE — TELEPHONE ENCOUNTER
Received request for H&P EKG CBC PT/PTT and CMP to be completed for pt's Lft total knee replacement at [facility] with Dr Rufino Minaya. Pt contacted and scheduled. Advised of testing needed, orders entered.   Pt voiced understanding and knew to contact

## 2020-02-11 NOTE — TELEPHONE ENCOUNTER
Received call from Dr Deena Johnson office, [facility]. She stated that she had faxed information for this on 2/6/20. Carlene Fire that our fax machine has been acting up and requested that she refax.   She voiced understanding and agreed to plan, stated that s

## 2020-02-11 NOTE — TELEPHONE ENCOUNTER
Spoke with Dr Kacy Barbosa about pt having 2 appointments scheduled. He advised that pt cancel the 20th and keep the one scheduled on the 24th and he will check her leg at that time.   Asked about pt's question about her Furosemide and potassium, per Dr Jez Oro

## 2020-02-18 ENCOUNTER — OFFICE VISIT (OUTPATIENT)
Dept: CARDIOLOGY | Age: 80
End: 2020-02-18

## 2020-02-18 VITALS
SYSTOLIC BLOOD PRESSURE: 100 MMHG | HEART RATE: 58 BPM | DIASTOLIC BLOOD PRESSURE: 70 MMHG | HEIGHT: 62 IN | BODY MASS INDEX: 41.41 KG/M2 | WEIGHT: 225 LBS

## 2020-02-18 DIAGNOSIS — E11.9 TYPE 2 DIABETES MELLITUS WITHOUT COMPLICATION, UNSPECIFIED WHETHER LONG TERM INSULIN USE (CMD): ICD-10-CM

## 2020-02-18 DIAGNOSIS — E78.00 PURE HYPERCHOLESTEROLEMIA: ICD-10-CM

## 2020-02-18 DIAGNOSIS — Z01.810 PREOPERATIVE CARDIOVASCULAR EXAMINATION: Primary | ICD-10-CM

## 2020-02-18 DIAGNOSIS — Z95.5 S/P PRIMARY ANGIOPLASTY WITH CORONARY STENT: ICD-10-CM

## 2020-02-18 DIAGNOSIS — I65.23 ASYMPTOMATIC CAROTID ARTERY STENOSIS, BILATERAL: ICD-10-CM

## 2020-02-18 DIAGNOSIS — I35.0 NONRHEUMATIC AORTIC VALVE STENOSIS: ICD-10-CM

## 2020-02-18 PROCEDURE — 99213 OFFICE O/P EST LOW 20 MIN: CPT | Performed by: NURSE PRACTITIONER

## 2020-02-18 PROCEDURE — 93000 ELECTROCARDIOGRAM COMPLETE: CPT | Performed by: NURSE PRACTITIONER

## 2020-02-18 RX ORDER — METOPROLOL SUCCINATE 25 MG/1
25 TABLET, EXTENDED RELEASE ORAL DAILY
Qty: 30 TABLET | Refills: 11 | Status: SHIPPED | OUTPATIENT
Start: 2020-02-18 | End: 2021-01-06

## 2020-02-18 ASSESSMENT — PATIENT HEALTH QUESTIONNAIRE - PHQ9
SUM OF ALL RESPONSES TO PHQ9 QUESTIONS 1 AND 2: 0
1. LITTLE INTEREST OR PLEASURE IN DOING THINGS: NOT AT ALL
2. FEELING DOWN, DEPRESSED OR HOPELESS: NOT AT ALL
SUM OF ALL RESPONSES TO PHQ9 QUESTIONS 1 AND 2: 0

## 2020-02-18 ASSESSMENT — ENCOUNTER SYMPTOMS
ORTHOPNEA: 0
SHORTNESS OF BREATH: 0
ABDOMINAL PAIN: 0
NIGHT SWEATS: 0
COUGH: 0
BLOATING: 0
FEVER: 0
SYNCOPE: 0
NEAR-SYNCOPE: 0

## 2020-02-24 ENCOUNTER — OFFICE VISIT (OUTPATIENT)
Dept: FAMILY MEDICINE CLINIC | Facility: CLINIC | Age: 80
End: 2020-02-24
Payer: MEDICARE

## 2020-02-24 ENCOUNTER — HOSPITAL ENCOUNTER (OUTPATIENT)
Dept: GENERAL RADIOLOGY | Age: 80
Discharge: HOME | End: 2020-02-24
Attending: FAMILY MEDICINE
Payer: MEDICARE

## 2020-02-24 VITALS
TEMPERATURE: 98 F | DIASTOLIC BLOOD PRESSURE: 70 MMHG | HEIGHT: 62 IN | HEART RATE: 53 BPM | RESPIRATION RATE: 16 BRPM | BODY MASS INDEX: 40.3 KG/M2 | SYSTOLIC BLOOD PRESSURE: 110 MMHG | OXYGEN SATURATION: 98 % | WEIGHT: 219 LBS

## 2020-02-24 DIAGNOSIS — R05.9 COUGH: ICD-10-CM

## 2020-02-24 DIAGNOSIS — Z01.818 PRE-OPERATIVE CLEARANCE: Primary | ICD-10-CM

## 2020-02-24 DIAGNOSIS — E11.9 TYPE 2 DIABETES MELLITUS WITHOUT COMPLICATION, WITHOUT LONG-TERM CURRENT USE OF INSULIN (HCC): ICD-10-CM

## 2020-02-24 DIAGNOSIS — Z86.718 HISTORY OF DVT OF LOWER EXTREMITY: ICD-10-CM

## 2020-02-24 DIAGNOSIS — R06.02 SHORTNESS OF BREATH: ICD-10-CM

## 2020-02-24 DIAGNOSIS — M17.12 PRIMARY OSTEOARTHRITIS OF LEFT KNEE: ICD-10-CM

## 2020-02-24 DIAGNOSIS — I10 ESSENTIAL HYPERTENSION: ICD-10-CM

## 2020-02-24 DIAGNOSIS — E03.9 HYPOTHYROIDISM, UNSPECIFIED TYPE: ICD-10-CM

## 2020-02-24 DIAGNOSIS — E78.2 MIXED HYPERLIPIDEMIA: ICD-10-CM

## 2020-02-24 DIAGNOSIS — I25.10 CORONARY ARTERY DISEASE INVOLVING NATIVE HEART WITHOUT ANGINA PECTORIS, UNSPECIFIED VESSEL OR LESION TYPE: ICD-10-CM

## 2020-02-24 DIAGNOSIS — F41.9 ANXIETY AND DEPRESSION: ICD-10-CM

## 2020-02-24 DIAGNOSIS — F32.A ANXIETY AND DEPRESSION: ICD-10-CM

## 2020-02-24 PROCEDURE — 99215 OFFICE O/P EST HI 40 MIN: CPT | Performed by: FAMILY MEDICINE

## 2020-02-24 PROCEDURE — 71046 X-RAY EXAM CHEST 2 VIEWS: CPT | Performed by: FAMILY MEDICINE

## 2020-02-24 RX ORDER — METHYLPREDNISOLONE 4 MG/1
TABLET ORAL
Qty: 1 KIT | Refills: 0 | Status: SHIPPED | OUTPATIENT
Start: 2020-02-24 | End: 2020-05-20 | Stop reason: ALTCHOICE

## 2020-02-24 RX ORDER — METOPROLOL SUCCINATE 25 MG/1
25 TABLET, EXTENDED RELEASE ORAL
COMMUNITY
Start: 2020-02-18

## 2020-02-24 RX ORDER — AZITHROMYCIN 250 MG/1
TABLET, FILM COATED ORAL
Qty: 6 TABLET | Refills: 0 | Status: SHIPPED | OUTPATIENT
Start: 2020-02-24 | End: 2020-03-02 | Stop reason: ALTCHOICE

## 2020-02-24 RX ORDER — ALBUTEROL SULFATE 90 UG/1
2 AEROSOL, METERED RESPIRATORY (INHALATION) EVERY 4 HOURS PRN
Qty: 1 INHALER | Refills: 0 | Status: SHIPPED | OUTPATIENT
Start: 2020-02-24 | End: 2020-03-18

## 2020-02-24 NOTE — PROGRESS NOTES
Sonam Coker is a 67year old female who presents for a pre-operative physical exam. Patient is to have left total knee replacement, to be done by Dr. Maynor Kaiser at [facility] on 3/13/2020. HPI:   Pt complains of left knee pain for over 1 year.   He tablet 0   • Albuterol Sulfate HFA (PROAIR HFA) 108 (90 Base) MCG/ACT Inhalation Aero Soln Inhale 2 puffs into the lungs every 4 (four) hours as needed for Wheezing (every 4-6 hours as needed).  1 Inhaler 0      Allergies:   Codeine                 NAUSEA A ST  LUNGS: See HPI  CARDIOVASCULAR: denies chest pain on exertion  GI: denies abdominal pain,denies heartburn  : denies dysuria, vaginal discharge or itching,periods regular denies nocturia or changes in stream  MUSCULOSKELETAL: See HPI  NEURO: denies he type  Continue Plavix 75 mg daily  Continue aspirin 81 mg daily    5. Anxiety and depression  Continue sertraline 50 mg daily    Time: 45 minutes total  Continue Lorazepam 0.5 mg 1 tablet daily as needed    6.  Mixed hyperlipidemia  Continue atorvastatin 10

## 2020-02-27 DIAGNOSIS — R06.02 SHORTNESS OF BREATH: ICD-10-CM

## 2020-02-27 DIAGNOSIS — E87.79 OTHER HYPERVOLEMIA: ICD-10-CM

## 2020-02-27 RX ORDER — FUROSEMIDE 20 MG/1
TABLET ORAL
Qty: 90 TABLET | Refills: 0 | Status: SHIPPED | OUTPATIENT
Start: 2020-02-27 | End: 2020-05-26

## 2020-02-28 ENCOUNTER — TELEPHONE (OUTPATIENT)
Dept: FAMILY MEDICINE CLINIC | Facility: CLINIC | Age: 80
End: 2020-02-28

## 2020-02-28 NOTE — TELEPHONE ENCOUNTER
Pt said she still is experiencing head congestion and cough all week while using   methylPREDNISolone (MEDROL) 4 MG Oral Tablet Therapy Pack, azithromycin (ZITHROMAX Z-FREDY) 250 MG Oral Tab, Albuterol Sulfate HFA (PROAIR HFA) 108 (90 Base) MCG/ACT.

## 2020-02-28 NOTE — TELEPHONE ENCOUNTER
Start Arnuity 200 mcg 1 puff daily. Have the patient schedule visit with me for Monday or Tuesday of next week. She should go to the urgent care center this weekend should she feel any worse.

## 2020-02-28 NOTE — TELEPHONE ENCOUNTER
Call to pt-sts [facility] had no improvement in chest congestion, cough or shortness of breath since starting medrol and abx 2/24-wanted to let dr Deann Randall know because I have surgery coming up 3/13. \"  Denies fever, chest pain or any other new/worsening symp

## 2020-02-28 NOTE — TELEPHONE ENCOUNTER
Call to pt-advised of dr dietz's orders and explained rationales. Informed will send med [facility]. Instructed to allow pharmacist to instruct her in med and how to use inhaler.   Scheduled follow up appt for Monday 100pm. Rafiq

## 2020-03-02 ENCOUNTER — OFFICE VISIT (OUTPATIENT)
Dept: FAMILY MEDICINE CLINIC | Facility: CLINIC | Age: 80
End: 2020-03-02
Payer: MEDICARE

## 2020-03-02 VITALS
OXYGEN SATURATION: 98 % | DIASTOLIC BLOOD PRESSURE: 56 MMHG | SYSTOLIC BLOOD PRESSURE: 108 MMHG | HEIGHT: 62 IN | WEIGHT: 220 LBS | TEMPERATURE: 98 F | BODY MASS INDEX: 40.48 KG/M2 | HEART RATE: 60 BPM | RESPIRATION RATE: 18 BRPM

## 2020-03-02 DIAGNOSIS — R05.3 PERSISTENT COUGH FOR 3 WEEKS OR LONGER: Primary | ICD-10-CM

## 2020-03-02 PROCEDURE — 99213 OFFICE O/P EST LOW 20 MIN: CPT | Performed by: FAMILY MEDICINE

## 2020-03-02 RX ORDER — PREDNISONE 10 MG/1
TABLET ORAL
Qty: 22 TABLET | Refills: 0 | Status: SHIPPED | OUTPATIENT
Start: 2020-03-02 | End: 2020-05-20 | Stop reason: ALTCHOICE

## 2020-03-02 NOTE — PROGRESS NOTES
Adams Camejo is a 67year old female. HPI:   Patient states that her cough is persisting. She did recently start on the Arnuity 200 mcg. He is not sure it is made a difference yet. She denies fever. She denies unusual dyspnea with exertion.   She do (MEDROL) 4 MG Oral Tablet Therapy Pack As directed.  1 kit 0      Past Medical History:   Diagnosis Date   • Acute bronchitis    • Anesthesia complication     patient woke up during her colonoscopy was not provided enough sedation   • Anxiety state    • Ast tabs daily for 4 days then 3 tabs for one day then 2 tabs for one day then 1 tab for one day   Start the albuterol inhaler as directed  Continue the Arnuity    Imaging & Consults:  None  See again in 1 week

## 2020-03-05 DIAGNOSIS — E03.9 HYPOTHYROIDISM, UNSPECIFIED TYPE: ICD-10-CM

## 2020-03-05 RX ORDER — LEVOTHYROXINE SODIUM 0.07 MG/1
75 TABLET ORAL
Qty: 90 TABLET | Refills: 0 | Status: SHIPPED | OUTPATIENT
Start: 2020-03-05 | End: 2020-08-14

## 2020-03-05 RX ORDER — LEVOTHYROXINE SODIUM 0.05 MG/1
TABLET ORAL
Qty: 90 TABLET | Refills: 0 | Status: SHIPPED | OUTPATIENT
Start: 2020-03-05 | End: 2020-03-05 | Stop reason: CLARIF

## 2020-03-07 DIAGNOSIS — R06.02 SHORTNESS OF BREATH: ICD-10-CM

## 2020-03-07 DIAGNOSIS — E87.79 OTHER HYPERVOLEMIA: ICD-10-CM

## 2020-03-09 RX ORDER — POTASSIUM CHLORIDE 750 MG/1
TABLET, FILM COATED, EXTENDED RELEASE ORAL
Qty: 90 TABLET | Refills: 0 | Status: SHIPPED | OUTPATIENT
Start: 2020-03-09 | End: 2020-06-16

## 2020-03-15 RX ORDER — PANTOPRAZOLE SODIUM 40 MG/1
TABLET, DELAYED RELEASE ORAL
Qty: 90 TABLET | Refills: 0 | Status: ON HOLD | OUTPATIENT
Start: 2020-03-15 | End: 2020-06-19

## 2020-03-18 NOTE — CARDIAC REHAB
Cardiac rehab education completed with patient. Stent card given. Pt will call for her cardiac rehab appt. Additional Notes: Verbal consent was obtained from patient , see above note Detail Level: Simple

## 2020-04-01 DIAGNOSIS — F32.A ANXIETY AND DEPRESSION: ICD-10-CM

## 2020-04-01 DIAGNOSIS — F41.9 ANXIETY AND DEPRESSION: ICD-10-CM

## 2020-04-01 RX ORDER — LORAZEPAM 0.5 MG/1
TABLET ORAL
Qty: 30 TABLET | Refills: 0 | Status: SHIPPED | OUTPATIENT
Start: 2020-04-01 | End: 2020-08-24

## 2020-04-15 RX ORDER — APIXABAN 5 MG/1
TABLET, FILM COATED ORAL
Qty: 60 TABLET | Refills: 1 | Status: SHIPPED | OUTPATIENT
Start: 2020-04-15 | End: 2020-06-16

## 2020-04-19 ENCOUNTER — TELEPHONE (OUTPATIENT)
Dept: FAMILY MEDICINE CLINIC | Facility: CLINIC | Age: 80
End: 2020-04-19

## 2020-04-19 DIAGNOSIS — E03.9 HYPOTHYROIDISM, UNSPECIFIED TYPE: Primary | ICD-10-CM

## 2020-04-19 DIAGNOSIS — M25.569 KNEE PAIN: ICD-10-CM

## 2020-04-19 DIAGNOSIS — I25.10 CORONARY ARTERY DISEASE INVOLVING NATIVE HEART WITHOUT ANGINA PECTORIS, UNSPECIFIED VESSEL OR LESION TYPE: ICD-10-CM

## 2020-04-19 DIAGNOSIS — M17.10 OSTEOARTHROSIS, LOCALIZED, PRIMARY, KNEE: ICD-10-CM

## 2020-04-19 DIAGNOSIS — E11.9 TYPE 2 DIABETES MELLITUS WITHOUT COMPLICATION, WITHOUT LONG-TERM CURRENT USE OF INSULIN (HCC): ICD-10-CM

## 2020-04-19 DIAGNOSIS — E78.2 MIXED HYPERLIPIDEMIA: ICD-10-CM

## 2020-04-19 DIAGNOSIS — I10 ESSENTIAL HYPERTENSION: ICD-10-CM

## 2020-04-19 DIAGNOSIS — Z86.718 HISTORY OF DVT OF LOWER EXTREMITY: ICD-10-CM

## 2020-04-19 RX ORDER — ATORVASTATIN CALCIUM 10 MG/1
TABLET, FILM COATED ORAL
Qty: 90 TABLET | Refills: 0 | Status: ON HOLD | OUTPATIENT
Start: 2020-04-19 | End: 2020-06-19

## 2020-04-29 DIAGNOSIS — I10 ESSENTIAL HYPERTENSION: ICD-10-CM

## 2020-04-29 NOTE — TELEPHONE ENCOUNTER
Pt called back. She was notified she needs to get her fasting labs done and then have a video visit. Pt verbalized understanding. I transferred her to [facility] to assist pt with scheduling a video visit.

## 2020-05-11 ENCOUNTER — LAB ENCOUNTER (OUTPATIENT)
Dept: LAB | Age: 80
End: 2020-05-11
Attending: FAMILY MEDICINE
Payer: MEDICARE

## 2020-05-11 DIAGNOSIS — E78.2 MIXED HYPERLIPIDEMIA: ICD-10-CM

## 2020-05-11 DIAGNOSIS — Z86.718 HISTORY OF DVT OF LOWER EXTREMITY: ICD-10-CM

## 2020-05-11 DIAGNOSIS — E03.9 HYPOTHYROIDISM, UNSPECIFIED TYPE: ICD-10-CM

## 2020-05-11 DIAGNOSIS — I10 ESSENTIAL HYPERTENSION: ICD-10-CM

## 2020-05-11 DIAGNOSIS — M17.10 OSTEOARTHROSIS, LOCALIZED, PRIMARY, KNEE: ICD-10-CM

## 2020-05-11 DIAGNOSIS — I25.10 CORONARY ARTERY DISEASE INVOLVING NATIVE HEART WITHOUT ANGINA PECTORIS, UNSPECIFIED VESSEL OR LESION TYPE: ICD-10-CM

## 2020-05-11 DIAGNOSIS — E11.9 TYPE 2 DIABETES MELLITUS WITHOUT COMPLICATION, WITHOUT LONG-TERM CURRENT USE OF INSULIN (HCC): ICD-10-CM

## 2020-05-11 PROCEDURE — 82043 UR ALBUMIN QUANTITATIVE: CPT

## 2020-05-11 PROCEDURE — 82570 ASSAY OF URINE CREATININE: CPT

## 2020-05-11 PROCEDURE — 85025 COMPLETE CBC W/AUTO DIFF WBC: CPT

## 2020-05-11 PROCEDURE — 84439 ASSAY OF FREE THYROXINE: CPT

## 2020-05-11 PROCEDURE — 36415 COLL VENOUS BLD VENIPUNCTURE: CPT

## 2020-05-11 PROCEDURE — 80053 COMPREHEN METABOLIC PANEL: CPT

## 2020-05-11 PROCEDURE — 80061 LIPID PANEL: CPT

## 2020-05-11 PROCEDURE — 83036 HEMOGLOBIN GLYCOSYLATED A1C: CPT

## 2020-05-11 PROCEDURE — 84443 ASSAY THYROID STIM HORMONE: CPT

## 2020-05-12 ENCOUNTER — TELEPHONE (OUTPATIENT)
Dept: FAMILY MEDICINE CLINIC | Facility: CLINIC | Age: 80
End: 2020-05-12

## 2020-05-12 ENCOUNTER — TELEPHONE (OUTPATIENT)
Dept: CARDIOLOGY | Age: 80
End: 2020-05-12

## 2020-05-12 DIAGNOSIS — Z01.818 PRE-OP TESTING: ICD-10-CM

## 2020-05-12 DIAGNOSIS — I35.0 AORTIC STENOSIS, MODERATE: ICD-10-CM

## 2020-05-12 DIAGNOSIS — E11.9 TYPE 2 DIABETES MELLITUS WITHOUT COMPLICATION, WITHOUT LONG-TERM CURRENT USE OF INSULIN (HCC): ICD-10-CM

## 2020-05-12 DIAGNOSIS — Z86.718 HISTORY OF DVT OF LOWER EXTREMITY: Primary | ICD-10-CM

## 2020-05-12 DIAGNOSIS — M17.0 OSTEOARTHRITIS OF BOTH KNEES, UNSPECIFIED OSTEOARTHRITIS TYPE: ICD-10-CM

## 2020-05-18 ENCOUNTER — TELEMEDICINE (OUTPATIENT)
Dept: FAMILY MEDICINE CLINIC | Facility: CLINIC | Age: 80
End: 2020-05-18
Payer: MEDICARE

## 2020-05-18 DIAGNOSIS — I35.0 AORTIC STENOSIS, MODERATE: ICD-10-CM

## 2020-05-18 DIAGNOSIS — Z86.718 HISTORY OF DVT OF LOWER EXTREMITY: ICD-10-CM

## 2020-05-18 DIAGNOSIS — E11.9 TYPE 2 DIABETES MELLITUS WITHOUT COMPLICATION, WITHOUT LONG-TERM CURRENT USE OF INSULIN (HCC): Primary | ICD-10-CM

## 2020-05-18 DIAGNOSIS — E03.9 HYPOTHYROIDISM, UNSPECIFIED TYPE: ICD-10-CM

## 2020-05-18 DIAGNOSIS — F32.A ANXIETY AND DEPRESSION: ICD-10-CM

## 2020-05-18 DIAGNOSIS — F41.9 ANXIETY AND DEPRESSION: ICD-10-CM

## 2020-05-18 DIAGNOSIS — I25.10 CORONARY ARTERY DISEASE INVOLVING NATIVE HEART WITHOUT ANGINA PECTORIS, UNSPECIFIED VESSEL OR LESION TYPE: ICD-10-CM

## 2020-05-18 DIAGNOSIS — I10 ESSENTIAL HYPERTENSION: ICD-10-CM

## 2020-05-18 DIAGNOSIS — E78.2 MIXED HYPERLIPIDEMIA: ICD-10-CM

## 2020-05-18 PROCEDURE — 99214 OFFICE O/P EST MOD 30 MIN: CPT | Performed by: FAMILY MEDICINE

## 2020-05-18 NOTE — PROGRESS NOTES
Subjective     HPI:   Dayne Sterling verbally consents to a Virtual/Telephone Check-In service on 05/18/20. Patient understands and accepts financial responsibility for any deductible, co-insurance and/or co-pays associated with this service.  This visit i Physical Exam:  alert, appears stated age and cooperative, Speaking in full sentences comfortably and Normal work of breathing        Assessment    Diagnoses and all orders for this visit:    Type 2 diabetes mellitus without complication, without long-te

## 2020-05-19 ENCOUNTER — OFFICE VISIT (OUTPATIENT)
Dept: CARDIOLOGY | Age: 80
End: 2020-05-19

## 2020-05-19 VITALS — HEIGHT: 62 IN | WEIGHT: 220 LBS | BODY MASS INDEX: 40.48 KG/M2

## 2020-05-19 DIAGNOSIS — I20.0 ANGINA PECTORIS, UNSTABLE (CMD): ICD-10-CM

## 2020-05-19 DIAGNOSIS — Z01.810 PREOPERATIVE CARDIOVASCULAR EXAMINATION: Primary | ICD-10-CM

## 2020-05-19 DIAGNOSIS — E11.9 TYPE 2 DIABETES MELLITUS WITHOUT COMPLICATION, UNSPECIFIED WHETHER LONG TERM INSULIN USE (CMD): ICD-10-CM

## 2020-05-19 DIAGNOSIS — E78.00 PURE HYPERCHOLESTEROLEMIA: ICD-10-CM

## 2020-05-19 DIAGNOSIS — I65.23 ASYMPTOMATIC CAROTID ARTERY STENOSIS, BILATERAL: ICD-10-CM

## 2020-05-19 DIAGNOSIS — Z95.5 S/P PRIMARY ANGIOPLASTY WITH CORONARY STENT: ICD-10-CM

## 2020-05-19 DIAGNOSIS — I35.0 NONRHEUMATIC AORTIC VALVE STENOSIS: ICD-10-CM

## 2020-05-19 PROCEDURE — 99442 TELEPHONE E&M BY PHYSICIAN EST PT NOT ORIG PREV 7 DAYS 11-20 MIN: CPT | Performed by: NURSE PRACTITIONER

## 2020-05-19 ASSESSMENT — ENCOUNTER SYMPTOMS
ORTHOPNEA: 0
NEAR-SYNCOPE: 0
SYNCOPE: 0
NIGHT SWEATS: 0
COUGH: 0
ABDOMINAL PAIN: 0
FEVER: 0
SHORTNESS OF BREATH: 0
BLOATING: 0

## 2020-05-19 ASSESSMENT — PATIENT HEALTH QUESTIONNAIRE - PHQ9
CLINICAL INTERPRETATION OF PHQ9 SCORE: NO FURTHER SCREENING NEEDED
2. FEELING DOWN, DEPRESSED OR HOPELESS: NOT AT ALL
CLINICAL INTERPRETATION OF PHQ2 SCORE: NO FURTHER SCREENING NEEDED
SUM OF ALL RESPONSES TO PHQ9 QUESTIONS 1 AND 2: 0
SUM OF ALL RESPONSES TO PHQ9 QUESTIONS 1 AND 2: 0
1. LITTLE INTEREST OR PLEASURE IN DOING THINGS: NOT AT ALL

## 2020-05-20 ENCOUNTER — OFFICE VISIT (OUTPATIENT)
Dept: FAMILY MEDICINE CLINIC | Facility: CLINIC | Age: 80
End: 2020-05-20
Payer: MEDICARE

## 2020-05-20 VITALS
SYSTOLIC BLOOD PRESSURE: 122 MMHG | WEIGHT: 219 LBS | DIASTOLIC BLOOD PRESSURE: 60 MMHG | HEART RATE: 64 BPM | TEMPERATURE: 99 F | HEIGHT: 62 IN | BODY MASS INDEX: 40.3 KG/M2 | RESPIRATION RATE: 18 BRPM

## 2020-05-20 DIAGNOSIS — E78.2 MIXED HYPERLIPIDEMIA: ICD-10-CM

## 2020-05-20 DIAGNOSIS — E03.9 HYPOTHYROIDISM, UNSPECIFIED TYPE: ICD-10-CM

## 2020-05-20 DIAGNOSIS — Z86.718 HISTORY OF DVT OF LOWER EXTREMITY: ICD-10-CM

## 2020-05-20 DIAGNOSIS — I35.0 AORTIC STENOSIS, MODERATE: ICD-10-CM

## 2020-05-20 DIAGNOSIS — I10 ESSENTIAL HYPERTENSION: ICD-10-CM

## 2020-05-20 DIAGNOSIS — I25.10 CORONARY ARTERY DISEASE INVOLVING NATIVE HEART WITHOUT ANGINA PECTORIS, UNSPECIFIED VESSEL OR LESION TYPE: ICD-10-CM

## 2020-05-20 DIAGNOSIS — Z01.818 PRE-OPERATIVE CLEARANCE: Primary | ICD-10-CM

## 2020-05-20 DIAGNOSIS — F32.A ANXIETY AND DEPRESSION: ICD-10-CM

## 2020-05-20 DIAGNOSIS — M17.11 PRIMARY LOCALIZED OSTEOARTHROSIS OF RIGHT LOWER LEG: ICD-10-CM

## 2020-05-20 DIAGNOSIS — E11.9 TYPE 2 DIABETES MELLITUS WITHOUT COMPLICATION, WITHOUT LONG-TERM CURRENT USE OF INSULIN (HCC): ICD-10-CM

## 2020-05-20 DIAGNOSIS — F41.9 ANXIETY AND DEPRESSION: ICD-10-CM

## 2020-05-20 PROCEDURE — 3078F DIAST BP <80 MM HG: CPT | Performed by: FAMILY MEDICINE

## 2020-05-20 PROCEDURE — 3008F BODY MASS INDEX DOCD: CPT | Performed by: FAMILY MEDICINE

## 2020-05-20 PROCEDURE — 3074F SYST BP LT 130 MM HG: CPT | Performed by: FAMILY MEDICINE

## 2020-05-20 PROCEDURE — 99214 OFFICE O/P EST MOD 30 MIN: CPT | Performed by: FAMILY MEDICINE

## 2020-05-20 RX ORDER — SERTRALINE HYDROCHLORIDE 100 MG/1
100 TABLET, FILM COATED ORAL DAILY
Qty: 30 TABLET | Refills: 3 | Status: ON HOLD | OUTPATIENT
Start: 2020-05-20 | End: 2020-06-24

## 2020-05-20 NOTE — PROGRESS NOTES
Jaxson Muro is a 79year old female who presents for a pre-operative physical exam. Patient is to have a right TKA, to be done by Dr. Maame Ordaz at [facility] on 6/19/2020. HPI:   Pt complains of a long history of right knee pain.   She states it i • Esophageal reflux    • H/O mammogram 10/21/1999   • Hepatitis    • High blood pressure    • High cholesterol    • History of PTCA 2011   • Hypothyroid    • Obesity, unspecified    • Osteoarthritis    • Prediabetes    • Unspecified essential hypertensi kg/m²   GENERAL: well developed, well nourished,in no apparent distress  SKIN: no rashes,no suspicious lesions  HEENT: atraumatic, normocephalic,ears and throat are clear  NECK: supple,no adenopathy,no bruits  LUNGS: clear to auscultation  CARDIO: RRR with +75 mcg daily    9. Coronary artery disease involving native heart without angina pectoris, unspecified vessel or lesion type  Aspirin and clopidogrel dosing per cardiology    10.  Anxiety and depression  May continue Lorazepam 0.5 mg 1 p.o. twice daily as

## 2020-05-21 DIAGNOSIS — E03.9 HYPOTHYROIDISM, UNSPECIFIED TYPE: ICD-10-CM

## 2020-05-21 RX ORDER — LEVOTHYROXINE SODIUM 0.2 MG/1
200 TABLET ORAL
Qty: 90 TABLET | Refills: 1 | Status: ON HOLD | OUTPATIENT
Start: 2020-05-21 | End: 2020-06-19

## 2020-05-23 DIAGNOSIS — R06.02 SHORTNESS OF BREATH: ICD-10-CM

## 2020-05-23 DIAGNOSIS — E87.79 OTHER HYPERVOLEMIA: ICD-10-CM

## 2020-05-26 RX ORDER — FUROSEMIDE 20 MG/1
TABLET ORAL
Qty: 90 TABLET | Refills: 1 | Status: ON HOLD | OUTPATIENT
Start: 2020-05-26 | End: 2020-06-19

## 2020-05-29 ENCOUNTER — APPOINTMENT (OUTPATIENT)
Dept: LAB | Age: 80
End: 2020-05-29
Attending: FAMILY MEDICINE
Payer: MEDICARE

## 2020-05-29 ENCOUNTER — TELEPHONE (OUTPATIENT)
Dept: CARDIOLOGY | Age: 80
End: 2020-05-29

## 2020-05-29 ENCOUNTER — LABORATORY ENCOUNTER (OUTPATIENT)
Dept: LAB | Age: 80
End: 2020-05-29
Attending: FAMILY MEDICINE
Payer: MEDICARE

## 2020-05-29 DIAGNOSIS — I82.402 LEG DVT (DEEP VENOUS THROMBOEMBOLISM), ACUTE, LEFT (HCC): ICD-10-CM

## 2020-05-29 DIAGNOSIS — Z01.818 PRE-OPERATIVE CLEARANCE: ICD-10-CM

## 2020-05-29 DIAGNOSIS — E11.9 TYPE 2 DIABETES MELLITUS WITHOUT COMPLICATION, WITHOUT LONG-TERM CURRENT USE OF INSULIN (HCC): ICD-10-CM

## 2020-05-29 DIAGNOSIS — Z86.718 HISTORY OF DVT OF LOWER EXTREMITY: ICD-10-CM

## 2020-05-29 DIAGNOSIS — Z01.818 PRE-OP TESTING: ICD-10-CM

## 2020-05-29 DIAGNOSIS — I25.10 CORONARY ARTERY DISEASE INVOLVING NATIVE HEART WITHOUT ANGINA PECTORIS, UNSPECIFIED VESSEL OR LESION TYPE: ICD-10-CM

## 2020-05-29 DIAGNOSIS — M17.0 OSTEOARTHRITIS OF BOTH KNEES, UNSPECIFIED OSTEOARTHRITIS TYPE: ICD-10-CM

## 2020-05-29 PROCEDURE — 36415 COLL VENOUS BLD VENIPUNCTURE: CPT

## 2020-05-29 PROCEDURE — 93010 ELECTROCARDIOGRAM REPORT: CPT | Performed by: INTERNAL MEDICINE

## 2020-05-29 PROCEDURE — 85610 PROTHROMBIN TIME: CPT

## 2020-05-29 PROCEDURE — 93005 ELECTROCARDIOGRAM TRACING: CPT

## 2020-05-29 PROCEDURE — 80053 COMPREHEN METABOLIC PANEL: CPT

## 2020-05-29 PROCEDURE — 85025 COMPLETE CBC W/AUTO DIFF WBC: CPT

## 2020-05-29 PROCEDURE — 85730 THROMBOPLASTIN TIME PARTIAL: CPT

## 2020-06-08 ENCOUNTER — LABORATORY ENCOUNTER (OUTPATIENT)
Dept: LAB | Age: 80
End: 2020-06-08
Attending: ORTHOPAEDIC SURGERY
Payer: MEDICARE

## 2020-06-08 DIAGNOSIS — M17.0 OSTEOARTHRITIS OF BOTH KNEES, UNSPECIFIED OSTEOARTHRITIS TYPE: ICD-10-CM

## 2020-06-08 PROCEDURE — 80051 ELECTROLYTE PANEL: CPT

## 2020-06-08 PROCEDURE — 86900 BLOOD TYPING SEROLOGIC ABO: CPT

## 2020-06-08 PROCEDURE — 86850 RBC ANTIBODY SCREEN: CPT

## 2020-06-08 PROCEDURE — 36415 COLL VENOUS BLD VENIPUNCTURE: CPT

## 2020-06-08 PROCEDURE — 86901 BLOOD TYPING SEROLOGIC RH(D): CPT

## 2020-06-09 ENCOUNTER — ANESTHESIA EVENT (OUTPATIENT)
Dept: SURGERY | Facility: HOSPITAL | Age: 80
DRG: 470 | End: 2020-06-09
Payer: MEDICARE

## 2020-06-11 NOTE — H&P
[facility]    PATIENT'S NAME: Chasidy Oleary   ATTENDING PHYSICIAN: Jalen Trivedi M.D.    PATIENT ACCOUNT#:   [de-identified]    LOCATION:    MEDICAL RECORD #:   [id number]       YOB: 1940  ADMISSION DATE:       06/19/2020    HISTORY AND P hepatitis in 1970s; hypercholesterolemia; visual impairment, use of eyeglasses.     PAST SURGICAL HISTORY:  PTCA; colonoscopy; angioplasty; stent, left anterior descending; angiogram; right knee arthrotomy with meniscectomy; C-sections x3; drug-eluting sten

## 2020-06-12 DIAGNOSIS — I10 ESSENTIAL HYPERTENSION: ICD-10-CM

## 2020-06-12 RX ORDER — LISINOPRIL 20 MG/1
TABLET ORAL
Qty: 90 TABLET | Refills: 1 | Status: ON HOLD | OUTPATIENT
Start: 2020-06-12 | End: 2020-06-19

## 2020-06-15 NOTE — H&P (VIEW-ONLY)
HISTORY AND PHYSICAL EXAMINATION    DATE: 06/11/2020  [facility]     DATE OF SURGERY:  06/19/2020      HISTORY OF PRESENT ILLNESS:  This is a 21-year-old female who presents with a chief complaint of left leg pain from her hip all the way down to her ank right knee arthrotomy with meniscectomy, C-sections x3 drug-eluting stents x2, Whipple procedure, I&D for a postoperative infection, right shoulder surgery. MEDICATIONS:    1. Lasix. 2. Synthroid. 3. Sertraline. 4. Lipitor. 5. Eliquis.   [facility]

## 2020-06-16 ENCOUNTER — APPOINTMENT (OUTPATIENT)
Dept: LAB | Age: 80
End: 2020-06-16
Attending: ORTHOPAEDIC SURGERY
Payer: MEDICARE

## 2020-06-16 ENCOUNTER — LAB ENCOUNTER (OUTPATIENT)
Dept: LAB | Facility: HOSPITAL | Age: 80
End: 2020-06-16
Attending: ORTHOPAEDIC SURGERY
Payer: MEDICARE

## 2020-06-16 DIAGNOSIS — M17.0 OSTEOARTHRITIS OF BOTH KNEES, UNSPECIFIED OSTEOARTHRITIS TYPE: ICD-10-CM

## 2020-06-16 DIAGNOSIS — R06.02 SHORTNESS OF BREATH: ICD-10-CM

## 2020-06-16 DIAGNOSIS — E87.79 OTHER HYPERVOLEMIA: ICD-10-CM

## 2020-06-16 PROCEDURE — 87081 CULTURE SCREEN ONLY: CPT

## 2020-06-16 RX ORDER — POTASSIUM CHLORIDE 750 MG/1
TABLET, FILM COATED, EXTENDED RELEASE ORAL
Qty: 90 TABLET | Refills: 1 | Status: ON HOLD | OUTPATIENT
Start: 2020-06-16 | End: 2020-06-19

## 2020-06-16 RX ORDER — APIXABAN 5 MG/1
TABLET, FILM COATED ORAL
Qty: 60 TABLET | Refills: 1 | Status: SHIPPED | OUTPATIENT
Start: 2020-06-16 | End: 2020-08-12

## 2020-06-16 NOTE — TELEPHONE ENCOUNTER
LOV: 5/20/2020 pre op  Last refill: 4/15/2020 60 tabs 1 refill    Please refill if appropriate. Thank you.

## 2020-06-18 NOTE — ANESTHESIA PREPROCEDURE EVALUATION
PRE-OP EVALUATION    Patient Name: Mily Wells    Pre-op Diagnosis: Primary osteoarthritis of left knee [M17.12]    Procedure(s):  LEFT TOTAL KNEE ARTHROPLASTY    Surgeon(s) and Role:     Naseem Benito MD - Primary    Pre-op vitals reviewed. 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  (+) GERD                           Cardiovascular  Comment: DVT LLE 12/19    Echo 11/19  Conclusions:     1. Left ventricle:  The c • DUAL ENERGY X-RAY ABSORPTIOMETRY, BODY COMPOSITION STUDY, 1+ SITES  2/18/2011   • OTHER SURGICAL HISTORY      whipple and then surgery a few days later for an infection   • OTHER SURGICAL HISTORY      2 stents   • SHOULDER SURG PROC UNLISTED      R shoul Spoke with pt and discussed risk of regional block including failure of block, conversion to GA, bleeding, infection, permanent nerve damage.  Patient understands and consents to regional.    Plan/risks discussed with: patient and spouse                Pres

## 2020-06-19 ENCOUNTER — ANESTHESIA (OUTPATIENT)
Dept: SURGERY | Facility: HOSPITAL | Age: 80
DRG: 470 | End: 2020-06-19
Payer: MEDICARE

## 2020-06-19 ENCOUNTER — APPOINTMENT (OUTPATIENT)
Dept: GENERAL RADIOLOGY | Facility: HOSPITAL | Age: 80
DRG: 470 | End: 2020-06-19
Attending: ORTHOPAEDIC SURGERY
Payer: MEDICARE

## 2020-06-19 ENCOUNTER — HOSPITAL ENCOUNTER (INPATIENT)
Facility: HOSPITAL | Age: 80
LOS: 2 days | Discharge: HOME HEALTH | DRG: 470 | End: 2020-06-24
Attending: ORTHOPAEDIC SURGERY | Admitting: ORTHOPAEDIC SURGERY
Payer: MEDICARE

## 2020-06-19 DIAGNOSIS — M17.12 PRIMARY OSTEOARTHRITIS OF LEFT KNEE: ICD-10-CM

## 2020-06-19 DIAGNOSIS — M17.0 OSTEOARTHRITIS OF BOTH KNEES, UNSPECIFIED OSTEOARTHRITIS TYPE: Primary | ICD-10-CM

## 2020-06-19 PROCEDURE — 99222 1ST HOSP IP/OBS MODERATE 55: CPT | Performed by: HOSPITALIST

## 2020-06-19 PROCEDURE — 3E0T3BZ INTRODUCTION OF ANESTHETIC AGENT INTO PERIPHERAL NERVES AND PLEXI, PERCUTANEOUS APPROACH: ICD-10-PCS | Performed by: ANESTHESIOLOGY

## 2020-06-19 PROCEDURE — 0SRD0J9 REPLACEMENT OF LEFT KNEE JOINT WITH SYNTHETIC SUBSTITUTE, CEMENTED, OPEN APPROACH: ICD-10-PCS | Performed by: ORTHOPAEDIC SURGERY

## 2020-06-19 PROCEDURE — 76942 ECHO GUIDE FOR BIOPSY: CPT | Performed by: ANESTHESIOLOGY

## 2020-06-19 PROCEDURE — 73560 X-RAY EXAM OF KNEE 1 OR 2: CPT | Performed by: ORTHOPAEDIC SURGERY

## 2020-06-19 DEVICE — PSN STR HYB ST 14X+30 M: Type: IMPLANTABLE DEVICE | Site: KNEE | Status: FUNCTIONAL

## 2020-06-19 DEVICE — REFOBACIN BC R 1X40 US: Type: IMPLANTABLE DEVICE | Site: KNEE | Status: FUNCTIONAL

## 2020-06-19 DEVICE — PSN TIB STM 5 DEG SZ E L: Type: IMPLANTABLE DEVICE | Site: KNEE | Status: FUNCTIONAL

## 2020-06-19 DEVICE — PSN ASF PS 10MM PLY L 6-9EF: Type: IMPLANTABLE DEVICE | Site: KNEE | Status: FUNCTIONAL

## 2020-06-19 DEVICE — IMPLANTABLE DEVICE: Type: IMPLANTABLE DEVICE | Status: FUNCTIONAL

## 2020-06-19 DEVICE — PSN ALL POLY PAT PLY 32MM: Type: IMPLANTABLE DEVICE | Site: KNEE | Status: FUNCTIONAL

## 2020-06-19 DEVICE — PSN FEM PS CMT CCR NRW SZ9 L: Type: IMPLANTABLE DEVICE | Site: KNEE | Status: FUNCTIONAL

## 2020-06-19 RX ORDER — HYDROCODONE BITARTRATE AND ACETAMINOPHEN 10; 325 MG/1; MG/1
1-2 TABLET ORAL EVERY 4 HOURS PRN
Qty: 60 TABLET | Refills: 0 | Status: SHIPPED | OUTPATIENT
Start: 2020-06-19 | End: 2020-12-17 | Stop reason: ALTCHOICE

## 2020-06-19 RX ORDER — HYDROCODONE BITARTRATE AND ACETAMINOPHEN 5; 325 MG/1; MG/1
1 TABLET ORAL AS NEEDED
Status: DISCONTINUED | OUTPATIENT
Start: 2020-06-19 | End: 2020-06-19 | Stop reason: HOSPADM

## 2020-06-19 RX ORDER — POTASSIUM CHLORIDE 750 MG/1
10 TABLET, FILM COATED, EXTENDED RELEASE ORAL DAILY
Status: CANCELLED | OUTPATIENT
Start: 2020-06-19

## 2020-06-19 RX ORDER — MELATONIN
325
Status: DISCONTINUED | OUTPATIENT
Start: 2020-06-19 | End: 2020-06-24

## 2020-06-19 RX ORDER — FUROSEMIDE 20 MG/1
20 TABLET ORAL DAILY
COMMUNITY
End: 2021-02-01

## 2020-06-19 RX ORDER — HYDROCODONE BITARTRATE AND ACETAMINOPHEN 5; 325 MG/1; MG/1
2 TABLET ORAL AS NEEDED
Status: DISCONTINUED | OUTPATIENT
Start: 2020-06-19 | End: 2020-06-19 | Stop reason: HOSPADM

## 2020-06-19 RX ORDER — ACETAMINOPHEN 500 MG
1000 TABLET ORAL ONCE
Status: DISCONTINUED | OUTPATIENT
Start: 2020-06-19 | End: 2020-06-19 | Stop reason: HOSPADM

## 2020-06-19 RX ORDER — SODIUM CHLORIDE, SODIUM LACTATE, POTASSIUM CHLORIDE, CALCIUM CHLORIDE 600; 310; 30; 20 MG/100ML; MG/100ML; MG/100ML; MG/100ML
INJECTION, SOLUTION INTRAVENOUS CONTINUOUS
Status: DISCONTINUED | OUTPATIENT
Start: 2020-06-19 | End: 2020-06-23

## 2020-06-19 RX ORDER — ACETAMINOPHEN 325 MG/1
650 TABLET ORAL ONCE
Status: COMPLETED | OUTPATIENT
Start: 2020-06-19 | End: 2020-06-19

## 2020-06-19 RX ORDER — NALOXONE HYDROCHLORIDE 0.4 MG/ML
80 INJECTION, SOLUTION INTRAMUSCULAR; INTRAVENOUS; SUBCUTANEOUS AS NEEDED
Status: DISCONTINUED | OUTPATIENT
Start: 2020-06-19 | End: 2020-06-19 | Stop reason: HOSPADM

## 2020-06-19 RX ORDER — HYDROMORPHONE HYDROCHLORIDE 1 MG/ML
0.3 INJECTION, SOLUTION INTRAMUSCULAR; INTRAVENOUS; SUBCUTANEOUS EVERY 2 HOUR PRN
Status: DISCONTINUED | OUTPATIENT
Start: 2020-06-19 | End: 2020-06-20

## 2020-06-19 RX ORDER — MIDAZOLAM HYDROCHLORIDE 1 MG/ML
INJECTION INTRAMUSCULAR; INTRAVENOUS
Status: COMPLETED
Start: 2020-06-19 | End: 2020-06-19

## 2020-06-19 RX ORDER — DIPHENHYDRAMINE HYDROCHLORIDE 50 MG/ML
12.5 INJECTION INTRAMUSCULAR; INTRAVENOUS EVERY 4 HOURS PRN
Status: DISCONTINUED | OUTPATIENT
Start: 2020-06-19 | End: 2020-06-21

## 2020-06-19 RX ORDER — MIDAZOLAM HYDROCHLORIDE 1 MG/ML
INJECTION INTRAMUSCULAR; INTRAVENOUS AS NEEDED
Status: DISCONTINUED | OUTPATIENT
Start: 2020-06-19 | End: 2020-06-19 | Stop reason: SURG

## 2020-06-19 RX ORDER — GLYCOPYRROLATE 0.2 MG/ML
INJECTION, SOLUTION INTRAMUSCULAR; INTRAVENOUS AS NEEDED
Status: DISCONTINUED | OUTPATIENT
Start: 2020-06-19 | End: 2020-06-19 | Stop reason: SURG

## 2020-06-19 RX ORDER — DIPHENHYDRAMINE HCL 25 MG
25 CAPSULE ORAL EVERY 4 HOURS PRN
Status: DISCONTINUED | OUTPATIENT
Start: 2020-06-19 | End: 2020-06-21

## 2020-06-19 RX ORDER — OXYCODONE HYDROCHLORIDE 5 MG/1
2.5 TABLET ORAL EVERY 4 HOURS PRN
Status: DISCONTINUED | OUTPATIENT
Start: 2020-06-19 | End: 2020-06-20

## 2020-06-19 RX ORDER — POTASSIUM CHLORIDE 750 MG/1
10 TABLET, FILM COATED, EXTENDED RELEASE ORAL DAILY
Status: ON HOLD | COMMUNITY
End: 2020-06-24

## 2020-06-19 RX ORDER — OXYCODONE HYDROCHLORIDE 15 MG/1
15 TABLET ORAL EVERY 4 HOURS PRN
Status: DISCONTINUED | OUTPATIENT
Start: 2020-06-19 | End: 2020-06-19

## 2020-06-19 RX ORDER — CEFAZOLIN SODIUM/WATER 2 G/20 ML
2 SYRINGE (ML) INTRAVENOUS EVERY 8 HOURS
Status: DISCONTINUED | OUTPATIENT
Start: 2020-06-19 | End: 2020-06-19

## 2020-06-19 RX ORDER — ONDANSETRON 2 MG/ML
4 INJECTION INTRAMUSCULAR; INTRAVENOUS AS NEEDED
Status: DISCONTINUED | OUTPATIENT
Start: 2020-06-19 | End: 2020-06-19 | Stop reason: HOSPADM

## 2020-06-19 RX ORDER — SODIUM PHOSPHATE, DIBASIC AND SODIUM PHOSPHATE, MONOBASIC 7; 19 G/133ML; G/133ML
1 ENEMA RECTAL ONCE AS NEEDED
Status: DISCONTINUED | OUTPATIENT
Start: 2020-06-19 | End: 2020-06-24

## 2020-06-19 RX ORDER — HYDROMORPHONE HYDROCHLORIDE 1 MG/ML
0.4 INJECTION, SOLUTION INTRAMUSCULAR; INTRAVENOUS; SUBCUTANEOUS EVERY 2 HOUR PRN
Status: DISCONTINUED | OUTPATIENT
Start: 2020-06-19 | End: 2020-06-19

## 2020-06-19 RX ORDER — OXYCODONE HYDROCHLORIDE 5 MG/1
5 TABLET ORAL EVERY 4 HOURS PRN
Status: DISCONTINUED | OUTPATIENT
Start: 2020-06-19 | End: 2020-06-19

## 2020-06-19 RX ORDER — LORAZEPAM 0.5 MG/1
0.5 TABLET ORAL
Status: DISCONTINUED | OUTPATIENT
Start: 2020-06-19 | End: 2020-06-24

## 2020-06-19 RX ORDER — SENNOSIDES 8.6 MG
17.2 TABLET ORAL NIGHTLY
Status: DISCONTINUED | OUTPATIENT
Start: 2020-06-19 | End: 2020-06-24

## 2020-06-19 RX ORDER — TIZANIDINE 2 MG/1
2 TABLET ORAL 3 TIMES DAILY PRN
Status: DISCONTINUED | OUTPATIENT
Start: 2020-06-19 | End: 2020-06-19

## 2020-06-19 RX ORDER — DOCUSATE SODIUM 100 MG/1
100 CAPSULE, LIQUID FILLED ORAL 2 TIMES DAILY
Status: DISCONTINUED | OUTPATIENT
Start: 2020-06-19 | End: 2020-06-24

## 2020-06-19 RX ORDER — ONDANSETRON 2 MG/ML
INJECTION INTRAMUSCULAR; INTRAVENOUS AS NEEDED
Status: DISCONTINUED | OUTPATIENT
Start: 2020-06-19 | End: 2020-06-19 | Stop reason: SURG

## 2020-06-19 RX ORDER — ACETAMINOPHEN 500 MG
1000 TABLET ORAL ONCE
Status: CANCELLED | OUTPATIENT
Start: 2020-06-19 | End: 2020-06-19

## 2020-06-19 RX ORDER — BUPIVACAINE HYDROCHLORIDE 2.5 MG/ML
INJECTION, SOLUTION EPIDURAL; INFILTRATION; INTRACAUDAL AS NEEDED
Status: DISCONTINUED | OUTPATIENT
Start: 2020-06-19 | End: 2020-06-19 | Stop reason: SURG

## 2020-06-19 RX ORDER — LISINOPRIL 20 MG/1
20 TABLET ORAL DAILY
Status: CANCELLED | OUTPATIENT
Start: 2020-06-19

## 2020-06-19 RX ORDER — DEXAMETHASONE SODIUM PHOSPHATE 4 MG/ML
VIAL (ML) INJECTION AS NEEDED
Status: DISCONTINUED | OUTPATIENT
Start: 2020-06-19 | End: 2020-06-19 | Stop reason: SURG

## 2020-06-19 RX ORDER — HYDROMORPHONE HYDROCHLORIDE 1 MG/ML
0.8 INJECTION, SOLUTION INTRAMUSCULAR; INTRAVENOUS; SUBCUTANEOUS EVERY 2 HOUR PRN
Status: DISCONTINUED | OUTPATIENT
Start: 2020-06-19 | End: 2020-06-19

## 2020-06-19 RX ORDER — SODIUM CHLORIDE, SODIUM LACTATE, POTASSIUM CHLORIDE, CALCIUM CHLORIDE 600; 310; 30; 20 MG/100ML; MG/100ML; MG/100ML; MG/100ML
INJECTION, SOLUTION INTRAVENOUS CONTINUOUS
Status: DISCONTINUED | OUTPATIENT
Start: 2020-06-19 | End: 2020-06-20

## 2020-06-19 RX ORDER — ROCURONIUM BROMIDE 10 MG/ML
INJECTION, SOLUTION INTRAVENOUS AS NEEDED
Status: DISCONTINUED | OUTPATIENT
Start: 2020-06-19 | End: 2020-06-19 | Stop reason: SURG

## 2020-06-19 RX ORDER — PROCHLORPERAZINE EDISYLATE 5 MG/ML
10 INJECTION INTRAMUSCULAR; INTRAVENOUS EVERY 6 HOURS PRN
Status: DISCONTINUED | OUTPATIENT
Start: 2020-06-19 | End: 2020-06-20

## 2020-06-19 RX ORDER — METOPROLOL SUCCINATE 25 MG/1
25 TABLET, EXTENDED RELEASE ORAL
Status: CANCELLED | OUTPATIENT
Start: 2020-06-19

## 2020-06-19 RX ORDER — BISACODYL 10 MG
10 SUPPOSITORY, RECTAL RECTAL
Status: DISCONTINUED | OUTPATIENT
Start: 2020-06-19 | End: 2020-06-24

## 2020-06-19 RX ORDER — SODIUM CHLORIDE, SODIUM LACTATE, POTASSIUM CHLORIDE, CALCIUM CHLORIDE 600; 310; 30; 20 MG/100ML; MG/100ML; MG/100ML; MG/100ML
INJECTION, SOLUTION INTRAVENOUS CONTINUOUS
Status: DISCONTINUED | OUTPATIENT
Start: 2020-06-19 | End: 2020-06-19 | Stop reason: HOSPADM

## 2020-06-19 RX ORDER — ACETAMINOPHEN 325 MG/1
TABLET ORAL
Status: COMPLETED
Start: 2020-06-19 | End: 2020-06-19

## 2020-06-19 RX ORDER — DEXAMETHASONE SODIUM PHOSPHATE 10 MG/ML
INJECTION, SOLUTION INTRAMUSCULAR; INTRAVENOUS AS NEEDED
Status: DISCONTINUED | OUTPATIENT
Start: 2020-06-19 | End: 2020-06-19 | Stop reason: SURG

## 2020-06-19 RX ORDER — PANTOPRAZOLE SODIUM 40 MG/1
40 TABLET, DELAYED RELEASE ORAL
Status: CANCELLED | OUTPATIENT
Start: 2020-06-19

## 2020-06-19 RX ORDER — OXYCODONE HYDROCHLORIDE 10 MG/1
10 TABLET ORAL EVERY 4 HOURS PRN
Status: DISCONTINUED | OUTPATIENT
Start: 2020-06-19 | End: 2020-06-19

## 2020-06-19 RX ORDER — ACETAMINOPHEN 500 MG
1000 TABLET ORAL 4 TIMES DAILY
Status: DISCONTINUED | OUTPATIENT
Start: 2020-06-19 | End: 2020-06-20

## 2020-06-19 RX ORDER — HYDROMORPHONE HYDROCHLORIDE 1 MG/ML
0.4 INJECTION, SOLUTION INTRAMUSCULAR; INTRAVENOUS; SUBCUTANEOUS EVERY 2 HOUR PRN
Status: DISCONTINUED | OUTPATIENT
Start: 2020-06-19 | End: 2020-06-20

## 2020-06-19 RX ORDER — CEFAZOLIN SODIUM/WATER 2 G/20 ML
2 SYRINGE (ML) INTRAVENOUS EVERY 8 HOURS
Status: COMPLETED | OUTPATIENT
Start: 2020-06-19 | End: 2020-06-19

## 2020-06-19 RX ORDER — DIPHENHYDRAMINE HYDROCHLORIDE 50 MG/ML
25 INJECTION INTRAMUSCULAR; INTRAVENOUS ONCE AS NEEDED
Status: DISCONTINUED | OUTPATIENT
Start: 2020-06-19 | End: 2020-06-19

## 2020-06-19 RX ORDER — DEXAMETHASONE SODIUM PHOSPHATE 10 MG/ML
8 INJECTION, SOLUTION INTRAMUSCULAR; INTRAVENOUS ONCE
Status: COMPLETED | OUTPATIENT
Start: 2020-06-20 | End: 2020-06-20

## 2020-06-19 RX ORDER — LEVOTHYROXINE SODIUM 0.2 MG/1
200 TABLET ORAL
COMMUNITY
End: 2021-01-11

## 2020-06-19 RX ORDER — NEOSTIGMINE METHYLSULFATE 1 MG/ML
INJECTION INTRAVENOUS AS NEEDED
Status: DISCONTINUED | OUTPATIENT
Start: 2020-06-19 | End: 2020-06-19 | Stop reason: SURG

## 2020-06-19 RX ORDER — MIDAZOLAM HYDROCHLORIDE 1 MG/ML
1 INJECTION INTRAMUSCULAR; INTRAVENOUS EVERY 5 MIN PRN
Status: DISCONTINUED | OUTPATIENT
Start: 2020-06-19 | End: 2020-06-19 | Stop reason: HOSPADM

## 2020-06-19 RX ORDER — OXYCODONE HYDROCHLORIDE 5 MG/1
5 TABLET ORAL EVERY 4 HOURS PRN
Status: DISCONTINUED | OUTPATIENT
Start: 2020-06-19 | End: 2020-06-20

## 2020-06-19 RX ORDER — HYDROMORPHONE HYDROCHLORIDE 1 MG/ML
0.2 INJECTION, SOLUTION INTRAMUSCULAR; INTRAVENOUS; SUBCUTANEOUS EVERY 2 HOUR PRN
Status: DISCONTINUED | OUTPATIENT
Start: 2020-06-19 | End: 2020-06-21

## 2020-06-19 RX ORDER — POLYETHYLENE GLYCOL 3350 17 G/17G
17 POWDER, FOR SOLUTION ORAL DAILY PRN
Status: DISCONTINUED | OUTPATIENT
Start: 2020-06-19 | End: 2020-06-24

## 2020-06-19 RX ORDER — PANTOPRAZOLE SODIUM 40 MG/1
40 TABLET, DELAYED RELEASE ORAL
COMMUNITY
End: 2020-12-17

## 2020-06-19 RX ORDER — HYDROMORPHONE HYDROCHLORIDE 1 MG/ML
INJECTION, SOLUTION INTRAMUSCULAR; INTRAVENOUS; SUBCUTANEOUS
Status: COMPLETED
Start: 2020-06-19 | End: 2020-06-19

## 2020-06-19 RX ORDER — LEVOTHYROXINE SODIUM 0.07 MG/1
75 TABLET ORAL
Status: CANCELLED | OUTPATIENT
Start: 2020-06-19

## 2020-06-19 RX ORDER — LORAZEPAM 0.5 MG/1
0.5 TABLET ORAL ONCE
Status: COMPLETED | OUTPATIENT
Start: 2020-06-19 | End: 2020-06-19

## 2020-06-19 RX ORDER — SERTRALINE HYDROCHLORIDE 100 MG/1
100 TABLET, FILM COATED ORAL DAILY
Status: CANCELLED | OUTPATIENT
Start: 2020-06-19

## 2020-06-19 RX ORDER — LISINOPRIL 20 MG/1
20 TABLET ORAL DAILY
COMMUNITY
End: 2020-12-17

## 2020-06-19 RX ORDER — HYDROMORPHONE HYDROCHLORIDE 1 MG/ML
0.4 INJECTION, SOLUTION INTRAMUSCULAR; INTRAVENOUS; SUBCUTANEOUS EVERY 5 MIN PRN
Status: DISCONTINUED | OUTPATIENT
Start: 2020-06-19 | End: 2020-06-19 | Stop reason: HOSPADM

## 2020-06-19 RX ORDER — LORAZEPAM 0.5 MG/1
0.5 TABLET ORAL EVERY 6 HOURS PRN
Status: CANCELLED | OUTPATIENT
Start: 2020-06-19

## 2020-06-19 RX ORDER — FUROSEMIDE 20 MG/1
20 TABLET ORAL DAILY
Status: CANCELLED | OUTPATIENT
Start: 2020-06-19

## 2020-06-19 RX ORDER — ATORVASTATIN CALCIUM 10 MG/1
10 TABLET, FILM COATED ORAL NIGHTLY
COMMUNITY
End: 2020-07-20

## 2020-06-19 RX ORDER — LIDOCAINE HYDROCHLORIDE 10 MG/ML
INJECTION, SOLUTION EPIDURAL; INFILTRATION; INTRACAUDAL; PERINEURAL AS NEEDED
Status: DISCONTINUED | OUTPATIENT
Start: 2020-06-19 | End: 2020-06-19 | Stop reason: SURG

## 2020-06-19 RX ORDER — LEVOTHYROXINE SODIUM 0.2 MG/1
200 TABLET ORAL
Status: CANCELLED | OUTPATIENT
Start: 2020-06-19

## 2020-06-19 RX ORDER — HYDROMORPHONE HYDROCHLORIDE 1 MG/ML
0.2 INJECTION, SOLUTION INTRAMUSCULAR; INTRAVENOUS; SUBCUTANEOUS EVERY 2 HOUR PRN
Status: DISCONTINUED | OUTPATIENT
Start: 2020-06-19 | End: 2020-06-19

## 2020-06-19 RX ORDER — ACETAMINOPHEN 500 MG
1000 TABLET ORAL ONCE
COMMUNITY
End: 2020-12-17 | Stop reason: ALTCHOICE

## 2020-06-19 RX ORDER — DEXTROSE MONOHYDRATE 25 G/50ML
50 INJECTION, SOLUTION INTRAVENOUS
Status: DISCONTINUED | OUTPATIENT
Start: 2020-06-19 | End: 2020-06-19 | Stop reason: HOSPADM

## 2020-06-19 RX ORDER — CEFAZOLIN SODIUM/WATER 2 G/20 ML
2 SYRINGE (ML) INTRAVENOUS ONCE
Status: COMPLETED | OUTPATIENT
Start: 2020-06-19 | End: 2020-06-19

## 2020-06-19 RX ORDER — ONDANSETRON 2 MG/ML
4 INJECTION INTRAMUSCULAR; INTRAVENOUS EVERY 4 HOURS PRN
Status: DISPENSED | OUTPATIENT
Start: 2020-06-19 | End: 2020-06-21

## 2020-06-19 RX ORDER — PHENYLEPHRINE HCL 10 MG/ML
VIAL (ML) INJECTION AS NEEDED
Status: DISCONTINUED | OUTPATIENT
Start: 2020-06-19 | End: 2020-06-19 | Stop reason: SURG

## 2020-06-19 RX ORDER — OXYCODONE HYDROCHLORIDE 10 MG/1
10 TABLET ORAL EVERY 4 HOURS PRN
Status: DISCONTINUED | OUTPATIENT
Start: 2020-06-19 | End: 2020-06-20

## 2020-06-19 RX ORDER — TIZANIDINE 2 MG/1
1 TABLET ORAL 3 TIMES DAILY PRN
Status: DISCONTINUED | OUTPATIENT
Start: 2020-06-19 | End: 2020-06-20

## 2020-06-19 RX ORDER — METOCLOPRAMIDE HYDROCHLORIDE 5 MG/ML
10 INJECTION INTRAMUSCULAR; INTRAVENOUS EVERY 6 HOURS PRN
Status: DISCONTINUED | OUTPATIENT
Start: 2020-06-19 | End: 2020-06-20

## 2020-06-19 RX ORDER — ZOLPIDEM TARTRATE 5 MG/1
5 TABLET ORAL NIGHTLY PRN
Status: DISCONTINUED | OUTPATIENT
Start: 2020-06-19 | End: 2020-06-19

## 2020-06-19 RX ADMIN — MIDAZOLAM HYDROCHLORIDE 1 MG: 1 INJECTION INTRAMUSCULAR; INTRAVENOUS at 06:59:00

## 2020-06-19 RX ADMIN — PHENYLEPHRINE HCL 100 MCG: 10 MG/ML VIAL (ML) INJECTION at 08:40:00

## 2020-06-19 RX ADMIN — ONDANSETRON 4 MG: 2 INJECTION INTRAMUSCULAR; INTRAVENOUS at 08:33:00

## 2020-06-19 RX ADMIN — NEOSTIGMINE METHYLSULFATE 3 MG: 1 INJECTION INTRAVENOUS at 08:33:00

## 2020-06-19 RX ADMIN — MIDAZOLAM HYDROCHLORIDE 1 MG: 1 INJECTION INTRAMUSCULAR; INTRAVENOUS at 07:04:00

## 2020-06-19 RX ADMIN — PHENYLEPHRINE HCL 50 MCG: 10 MG/ML VIAL (ML) INJECTION at 08:01:00

## 2020-06-19 RX ADMIN — PHENYLEPHRINE HCL 50 MCG: 10 MG/ML VIAL (ML) INJECTION at 08:06:00

## 2020-06-19 RX ADMIN — GLYCOPYRROLATE 0.4 MG: 0.2 INJECTION, SOLUTION INTRAMUSCULAR; INTRAVENOUS at 08:33:00

## 2020-06-19 RX ADMIN — SODIUM CHLORIDE, SODIUM LACTATE, POTASSIUM CHLORIDE, CALCIUM CHLORIDE: 600; 310; 30; 20 INJECTION, SOLUTION INTRAVENOUS at 08:57:00

## 2020-06-19 RX ADMIN — DEXAMETHASONE SODIUM PHOSPHATE 2 MG: 10 INJECTION, SOLUTION INTRAMUSCULAR; INTRAVENOUS at 07:10:00

## 2020-06-19 RX ADMIN — ROCURONIUM BROMIDE 20 MG: 10 INJECTION, SOLUTION INTRAVENOUS at 07:37:00

## 2020-06-19 RX ADMIN — LIDOCAINE HYDROCHLORIDE 50 MG: 10 INJECTION, SOLUTION EPIDURAL; INFILTRATION; INTRACAUDAL; PERINEURAL at 07:11:00

## 2020-06-19 RX ADMIN — CEFAZOLIN SODIUM/WATER 2 G: 2 G/20 ML SYRINGE (ML) INTRAVENOUS at 07:11:00

## 2020-06-19 RX ADMIN — DEXAMETHASONE SODIUM PHOSPHATE 4 MG: 4 MG/ML VIAL (ML) INJECTION at 07:23:00

## 2020-06-19 RX ADMIN — PHENYLEPHRINE HCL 100 MCG: 10 MG/ML VIAL (ML) INJECTION at 08:19:00

## 2020-06-19 RX ADMIN — PHENYLEPHRINE HCL 100 MCG: 10 MG/ML VIAL (ML) INJECTION at 08:24:00

## 2020-06-19 RX ADMIN — BUPIVACAINE HYDROCHLORIDE 20 ML: 2.5 INJECTION, SOLUTION EPIDURAL; INFILTRATION; INTRACAUDAL at 07:10:00

## 2020-06-19 NOTE — PHYSICAL THERAPY NOTE
Attempted to see for PT evaluation. Pt just beginning to have min return sensation left LE, unable to perform left quad set, therefore, not yet appropriate for out of bed activity with therapy. Will re attempt l;ater as time allows.

## 2020-06-19 NOTE — HOME CARE LIAISON
MET WITH PTNT AND OFFERED CHOICE  OF AGENCIES. PTNT AGREEABLE TO [facility]. MET WITH PTNT TO DISCUSS HOME HEALTH SERVICES AND COVERAGE CRITERIA. PTNT AGREEABLE TO [facility]. PTNT GIVEN RESIDENTIAL BROCHURE.  RESIDENTIAL WITH PROVIDE SN/PT ON DISC

## 2020-06-19 NOTE — ANESTHESIA POSTPROCEDURE EVALUATION
[address] Patient Status:  Outpatient in a Bed   Age/Gender 79year old female MRN [id number]   [facility] SURGERY Attending Pierre Chand MD   Hosp Day # 0 PCP Toño Kohler MD       Anesthesia Post-op Note

## 2020-06-19 NOTE — INTERVAL H&P NOTE
Pre-op Diagnosis: Primary osteoarthritis of left knee [M17.12]    The above referenced H&P was reviewed by Morgan Rubalcava MD on 6/19/2020, the patient was examined and no significant changes have occurred in the patient's condition since the H&P was perfor

## 2020-06-19 NOTE — CONSULTS
[facility]  CONSULT     Christal Kenyon Patient Status:  Outpatient in a Bed    3/11/1940 MRN [id number]   [facility] 3SW-A Attending Stanley Canseco MD   Hosp Day # 0 PCP Diallo Blood MD     Reason for consult: Medical caty reports that she has never smoked. She has never used smokeless tobacco. She reports current alcohol use. She reports that she does not use drugs.     Family History:   Family History   Problem Relation Age of Onset   • Cancer Father         Throat   • Lipi 15.8 oz (101.6 kg)   SpO2 95%   BMI 40.97 kg/m²   General: No acute distress. Alert and oriented x 3. HEENT: Normocephalic atraumatic. Moist mucous membranes. EOM-I. PERRLA. Anicteric. Neck: No lymphadenopathy. No JVD. No carotid bruits.   Respiratory: Cl

## 2020-06-19 NOTE — ANESTHESIA PROCEDURE NOTES
Airway  Date/Time: 6/19/2020 7:14 AM  Urgency: elective    Airway not difficult    General Information and Staff    Patient location during procedure: OR  Anesthesiologist: Cooper Barr MD  Performed: anesthesiologist     Indications and Patient Alia Tobar

## 2020-06-19 NOTE — PROGRESS NOTES
06/19/20 1639   BiPAP   $ RT Standby Charge (per 15 min) 1   BiPAP/CPAP Monitored Parameters   TIMMY Protocol Yes   Toleration Refused   Patient refused/does not wish to wear cpap at night.

## 2020-06-19 NOTE — ANESTHESIA PROCEDURE NOTES
Regional Block  Performed by: Lorenzo Ruiz MD  Authorized by: Lorenzo Ruiz MD       General Information and Staff    Start Time:  6/19/2020 7:05 AM  End Time:  6/19/2020 7:10 AM  Anesthesiologist:  Lorenzo Ruiz MD  Performed by:   Anesthesiologi

## 2020-06-19 NOTE — BRIEF OP NOTE
Pre-Operative Diagnosis: Primary osteoarthritis of left knee [M17.12]     Post-Operative Diagnosis: Primary osteoarthritis of left knee [M17.12]      Procedure Performed:   Procedure(s):  LEFT TOTAL KNEE ARTHROPLASTY    Surgeon(s) and Role:     * Deanne Dewey

## 2020-06-19 NOTE — INTERVAL H&P NOTE
Pre-op Diagnosis: Primary osteoarthritis of left knee [M17.12]    The above referenced H&P was reviewed by ALEJANDRA Burkett on 6/19/2020, the patient was examined and no significant changes have occurred in the patient's condition since the H&P was perform

## 2020-06-20 PROCEDURE — 30233N1 TRANSFUSION OF NONAUTOLOGOUS RED BLOOD CELLS INTO PERIPHERAL VEIN, PERCUTANEOUS APPROACH: ICD-10-PCS | Performed by: ORTHOPAEDIC SURGERY

## 2020-06-20 RX ORDER — ACETAMINOPHEN 325 MG/1
650 TABLET ORAL EVERY 4 HOURS PRN
Status: DISCONTINUED | OUTPATIENT
Start: 2020-06-20 | End: 2020-06-24

## 2020-06-20 RX ORDER — HYDROCODONE BITARTRATE AND ACETAMINOPHEN 5; 325 MG/1; MG/1
2 TABLET ORAL EVERY 4 HOURS PRN
Status: DISCONTINUED | OUTPATIENT
Start: 2020-06-20 | End: 2020-06-24

## 2020-06-20 RX ORDER — METOCLOPRAMIDE HYDROCHLORIDE 5 MG/ML
5 INJECTION INTRAMUSCULAR; INTRAVENOUS EVERY 6 HOURS PRN
Status: ACTIVE | OUTPATIENT
Start: 2020-06-20 | End: 2020-06-21

## 2020-06-20 RX ORDER — SODIUM CHLORIDE 9 MG/ML
INJECTION, SOLUTION INTRAVENOUS ONCE
Status: COMPLETED | OUTPATIENT
Start: 2020-06-20 | End: 2020-06-21

## 2020-06-20 RX ORDER — ACETAMINOPHEN 325 MG/1
325 TABLET ORAL EVERY 4 HOURS PRN
Status: DISCONTINUED | OUTPATIENT
Start: 2020-06-20 | End: 2020-06-24

## 2020-06-20 RX ORDER — HYDROCODONE BITARTRATE AND ACETAMINOPHEN 5; 325 MG/1; MG/1
1 TABLET ORAL EVERY 4 HOURS PRN
Status: DISCONTINUED | OUTPATIENT
Start: 2020-06-20 | End: 2020-06-24

## 2020-06-20 NOTE — OPERATIVE REPORT
[facility]    PATIENT'S NAME: Flavio Kate   ATTENDING PHYSICIAN: Danica Zarco M.D. OPERATING PHYSICIAN: Danica Zarco M.D.    PATIENT ACCOUNT#:   [de-identified]    LOCATION:  [address] [place] [address]  MEDICAL RECORD #:   [id number]       DATE OF BIRTH:  [birth year] anesthesiologist attempted to get the blood pressure under control, and the patient underwent general anesthesia. A second attempt at exsanguination was then performed. The tourniquet was raised to 400 mmHg.   Again, the incision was completed from the ti femoral component was then impacted in position. A drill bit was placed superiorly to make sure notching would not occur, and the 4-in-1 cutting block was pinned in position with trocar pins.   An oscillating saw was used to osteotomize the distal femur re flexed. The tibia was sized to a size E tibial tray. The tray was rotated externally to match the natural external rotation of the tibial tubercle and the tibial crest.  Was pinned in position.   A size 9 femoral trial was then impacted in position and la parapatellar incision was closed with #1 Ethibond suture and Quill suture. Deep tissues were closed with 0 and 2-0 Vicryl suture. Skin was closed with staples. An Aquacel dressing and sterile dressing was applied. A Polar Care unit was applied.   The to

## 2020-06-20 NOTE — PHYSICAL THERAPY NOTE
PHYSICAL THERAPY KNEE EVALUATION - INPATIENT     Room Number: 351/351-A  Evaluation Date: 6/20/2020  Type of Evaluation: Initial  Physician Order: PT Eval and Treat    Presenting Problem: (S/P  LT TKR on 6/19/2020 by Yelitza Larson)  Reason for Therapy: Mobility Rolling walker  Patient Regularly Uses: Glasses    Prior Level of [place]: Per pt, she was independent with amb with on and off use of walker    SUBJECTIVE  Pt stated \"I feel drowsy\"    Patient self-stated goal is to be able to walk    OBJECTIVE  Pr -   Need to walk in hospital room?: A Little   -   Climbing 3-5 steps with a railing?: A Lot       AM-PAC Score:  Raw Score: 16   Approx Degree of Impairment: 54.16%   Standardized Score (AM-PAC Scale): 40.78   CMS Modifier (G-Code): CK    FUNCTIONAL DELANO tolerance and to avoid functional decline. Pt understands well. Exercise/Education Provided:  Bed mobility  Body mechanics  Functional activity tolerated  Strengthening  Lower therapeutic exercise:   Ankle pumps  Glut sets  Hip AB/AD  Heel slides  Quad If pt progresses well during PT while in the hospital, pt may be able to DC home with HHPT/24hour assist/supervision. Discussed Role of PT, PT eval findings, POC and DC recommendations with patient and RN.  They understand well and are agreeable with PT re

## 2020-06-20 NOTE — OCCUPATIONAL THERAPY NOTE
OCCUPATIONAL THERAPY EVALUATION - INPATIENT     Room Number: 351/351-A  Evaluation Date: 6/20/2020  Type of Evaluation: Initial  Presenting Problem: s/p L TKA on 6/19     Physician Order: IP Consult to Occupational Therapy  Reason for Therapy: ADL/IADL Dys whipple and then surgery a few days later for an infection   • OTHER SURGICAL HISTORY      2 stents   • SHOULDER SURG PROC UNLISTED      R shoulder       OCCUPATIONAL PROFILE    HOME SITUATION  Type of Home: House  Home Layout: Multi-level(split level )  L in chair  109/41       O2 SATURATIONS           Ambulation oxygen flow (liters per minute): 2    ACTIVITIES OF DAILY LIVING ASSESSMENT  AM-PAC ‘6-Clicks’ Inpatient Daily Activity Short Form  How much help from another person does the patient currently need balance, strength, ROM and functional mobility. These deficits impact the patient’s ability to participate in BADL, instrumental activities of daily living, rest and sleep, work, leisure and social participation.      The patient is functioning below her pr with supervision  Patient will transfer to toilet:  with supervision

## 2020-06-20 NOTE — PROGRESS NOTES
Post Op Day 1 Ortho Note: Left TKA    Status Post Nerve Block    Type of Nerve Block: Left adductor canal  Single Injection Nerve Block    Post op review: No evidence of immediate block related complications, No paresthesia noted, Able to lift leg(s), Able

## 2020-06-20 NOTE — PLAN OF CARE
Bp down to 76/38 when up w/ PT/OT, IVF infusing at 125 cc/hr, Oxy dc'd, changed to Norco, NSR on tele, repeat Hgb=7.5, will monitor bp closely.

## 2020-06-20 NOTE — PLAN OF CARE
States pain increased while up in bed, nauseated x 1, PT/OT at 0900 , Oxy 5 mg x1 given prior to session, NSR on tele, Ativan ordered daily prn, updated on plan of care, pain levels monitored closely.

## 2020-06-20 NOTE — PROGRESS NOTES
[facility]  Progress Note    Kayli Thurman Patient Status:  Outpatient in a Bed    3/11/1940 MRN [id number]   [facility] 3SW-A Attending Oneil Flowers MD   Hosp Day # 0 PCP Jennie Claros MD     Subjective:  Kayli Thurman is

## 2020-06-20 NOTE — PLAN OF CARE
Pt very anxious and nauseated. She couldn't tolerate the CPM. RN encouraged her to try it but as per pt, \" I couldn't do it\". Anti anxiety medications given x 2 doses with relief. Anti nausea medications were given as well. No emesis.  Pain is mild to mod

## 2020-06-21 PROCEDURE — 99232 SBSQ HOSP IP/OBS MODERATE 35: CPT | Performed by: HOSPITALIST

## 2020-06-21 RX ORDER — SODIUM CHLORIDE 9 MG/ML
INJECTION, SOLUTION INTRAVENOUS CONTINUOUS
Status: DISCONTINUED | OUTPATIENT
Start: 2020-06-21 | End: 2020-06-22

## 2020-06-21 NOTE — PLAN OF CARE
Pt A & O x4, on RA. /IS. Tele-NSR. Eliquis. SCDs. Regular diet. Last BM 6/19, pt did take colace this AM after educating about risk of constipation. WBAT. Up x 1/walker PT/OT. Aquacel dressing CDI, ice therapy. CPM. Pt' s  at bedside.  Pt and anthonyb

## 2020-06-21 NOTE — PROGRESS NOTES
[facility]  Progress Note     Leonor Dejesus Patient Status:  Outpatient in a Bed    3/11/1940 MRN [id number]   [facility] 3SW-A Attending Stevie Bolaons MD   Hosp Day # 0 PCP Nicol Figueredo MD     Chief Complaint: s/p tkr sulfate  325 mg Oral Daily with breakfast   • apixaban  2.5 mg Oral [email or web address]       ASSESSMENT / PLAN:     3 79years old female presents for left total knee arthroplasty  -Continue pain management and physical therapy  2.   Hypothyroidism continue Syn

## 2020-06-21 NOTE — PROGRESS NOTES
[facility]  Progress Note    Delaney Haro Patient Status:  Outpatient in a Bed    3/11/1940 MRN [id number]   [facility] 3SW-A Attending Nato Sr MD   Hosp Day # 0 PCP Sidney Nava MD     Subjective:  Delaney Haro is

## 2020-06-21 NOTE — PHYSICAL THERAPY NOTE
PHYSICAL THERAPY KNEE TREATMENT NOTE - INPATIENT     Room Number: 289/516-F     Session: 1   Number of Visits to Meet Established Goals: 7    Presenting Problem: (S/P  LT TKR on 6/19/2020 by )    Problem List  Active Problems:    * No active hospi O2 WALK                  AM-PAC '6-Clicks' INPATIENT SHORT FORM - BASIC MOBILITY  How much difficulty does the patient currently have. ..  -   Turning over in bed (including adjusting bedclothes, sheets and blankets)?: A Little   -   Sitting down shuffling gait pattern eventually, demonstrating improved left knee control with time also. Pt tolerated little distance due to fatigue, pain. Pt educated in [address] for next session.  Pt instructed in use of towel roll under left heel to encourage left knee motion;Strengthening;Stoop training;Stair training;Transfer training;Balance training  Rehab Potential : Good  Frequency (Obs): Daily    CURRENT GOALS     Goal #1     Patient is able to demonstrate supine - sit EOB @ level:  CGAx1    Goal #2     Patient is

## 2020-06-21 NOTE — PLAN OF CARE
Bp =120/43, Hgb earlier=7.5, Dr. Nichol Navas decreased to 60 cc/hr , Oxycontin changed to Norco, tolerating Norco 1 tab , will be up to chair , no anxiety noted but Ativan ordered daily prn, d/c planning to [facility], updated on plan of care.

## 2020-06-21 NOTE — CM/SW NOTE
SW spoke to [place]. Plan is for dc tomorrow with [facility] not [facility]. [facility] aware,  SW to follow up on Monday re: DME needs.       Issac Cary LCSW  /Discharge Planner  [phone]

## 2020-06-21 NOTE — PROGRESS NOTES
Post Op Day 2 Ortho Note: Left TKA    Assessed patient in chair. States she is \"sore\" but pain is managed with medications; denies itching/nausea/dizziness. Able to bear weight on sx leg; equal sensation in BLE.      Pt had episode of hypotension accom

## 2020-06-21 NOTE — PROGRESS NOTES
SPOke with Renato Ko, informed that pt wants to go HOME not [facility] and will need [facility] set up and is requesting raised commode and shower chair.

## 2020-06-21 NOTE — PLAN OF CARE
PT POD 1 LTKR. BP low during dayshift, kept on bedrest for now. Last HGB 7.5. Orders for 1 unit PRBC infusing. AOX4, a little anxious tonight.  Ace wrap to L knee CDI, will be removed in AM. Pain well controlled with prn po pain medicine, but did not want t

## 2020-06-21 NOTE — OCCUPATIONAL THERAPY NOTE
OCCUPATIONAL THERAPY TREATMENT NOTE - INPATIENT     Room Number: 351/351-A  Session: 1   Number of Visits to Meet Established Goals: 4    Presenting Problem: s/p L TKA on 6/19     History related to current admission: Pt is 79year old female admitted on 6 SHOULDER SURG PROC UNLISTED      R shoulder       SUBJECTIVE  Pt stated, \"WE are not going to fight where I need to go. \"    Patient self-stated goal is to go home    OBJECTIVE  Precautions:  Other (Comment)(ortho hypo )    WEIGHT BEARING RESTRICTION  Evon Yarbrough Session: Up in chair;Needs met;Call light within reach;RN aware of session/findings; All patient questions and concerns addressed;SCDs in place; Family present    ASSESSMENT   Patient is a 79year old female admitted 6/19/2020 for L TKA.   In OT session 1 of

## 2020-06-22 PROBLEM — Z47.89 ORTHOPEDIC AFTERCARE: Status: ACTIVE | Noted: 2020-06-22

## 2020-06-22 PROBLEM — Z01.818 PRE-OP TESTING: Status: ACTIVE | Noted: 2020-06-22

## 2020-06-22 PROCEDURE — 99232 SBSQ HOSP IP/OBS MODERATE 35: CPT | Performed by: HOSPITALIST

## 2020-06-22 PROCEDURE — 99223 1ST HOSP IP/OBS HIGH 75: CPT | Performed by: INTERNAL MEDICINE

## 2020-06-22 RX ORDER — ONDANSETRON 2 MG/ML
4 INJECTION INTRAMUSCULAR; INTRAVENOUS EVERY 6 HOURS PRN
Status: DISCONTINUED | OUTPATIENT
Start: 2020-06-22 | End: 2020-06-24

## 2020-06-22 NOTE — CM/SW NOTE
06/22/20 1600   CM/SW Referral Data   Referral Source Social Work (self-referral)   Reason for Referral Discharge planning   Informant Patient;Spouse   Patient Info   Patient's Mental Status Alert;Oriented   Discharge Needs   Anticipated D/C needs Home

## 2020-06-22 NOTE — PLAN OF CARE
PT AOX4 this PM. BP lower tonight. Creatinine elevated to 2.46 from 1.79. Paged Dr. Stokes Peaks to notify. Order for 0.9 @ 83 and to consult nephrology. Paged to Consult Dr. Gloria Harada of nephrology @ 2022. Call back from Dr. Juana Bedolla of nephrology.  Order for UA, urine

## 2020-06-22 NOTE — CONSULTS
[facility]  Report of Consultation    Mera Kitchen Patient Status:  Outpatient in a Bed    3/11/1940 MRN [id number]   [facility] 3SW-A Attending Rory Benavides MD   Hosp Day # 0 PCP Clayton Diggs MD       Assessment / Plan: COMPOSITION STUDY, 1+ SITES  2/18/2011   • OTHER SURGICAL HISTORY      whipple and then surgery a few days later for an infection   • OTHER SURGICAL HISTORY      2 stents   • SHOULDER SURG PROC UNLISTED      R shoulder     Family History   Problem Relation Systems:  Please see HPI for pertinent positives. 10 point review of systems otherwise reviewed and negative.      Physical Exam:  /58 (BP Location: Right arm)   Pulse 103   Temp 97.8 °F (36.6 °C) (Oral)   Resp 18   Ht 62\"   Wt 223 lb 15.8 oz (101.6

## 2020-06-22 NOTE — OCCUPATIONAL THERAPY NOTE
OCCUPATIONAL THERAPY TREATMENT NOTE - INPATIENT     Room Number: 351/351-A  Session: 2   Number of Visits to Meet Established Goals: 4    Presenting Problem: s/p L TKA on 6/19     History related to current admission: Pt is 79year old female admitted on 6 REPLACEMENT Left 6/19/2020    Performed by Angel Mora MD at [facility] MAIN OR   • OTHER SURGICAL HISTORY      whipple and then surgery a few days later for an infection   • OTHER SURGICAL HISTORY      2 stents   • SHOULDER SURG PROC UNLISTED      R shoulder knees SBA, to waist min assist for balance in standing; functional mobility to bathroom via RW and CGA; education on toileting and toilet transfer techniques, performed transfer with min assist to raised height toilet with heavy use of grab bar required (r training;Patient/Family education;Patient/Family training;Equipment eval/education; Compensatory technique education;Continued evaluation  Rehab Potential : Fair  Frequency (Obs): Daily      OT Goals:   ADL Goals   Patient will perform lower body dressing:

## 2020-06-22 NOTE — OPERATIVE REPORT
[address] [place]    PATIENT'S NAME: Jeremi Juilo   ATTENDING PHYSICIAN: Heavenly Cruz M.D. OPERATING PHYSICIAN: Heavenly Cruz M.D.    PATIENT ACCOUNT#:   [de-identified]    LOCATION:  [address] [place] [address]  MEDICAL RECORD #:   [id number]       DATE OF BIRTH:  [birth year] released the tourniquet. The anesthesiologist attempted to get the blood pressure under control, and the patient underwent general anesthesia. A second attempt at exsanguination was then performed. The tourniquet was raised to 400 mmHg.   Again, the vivian 4-in-1 cutting block for a size 9 femoral component was then impacted in position. A drill bit was placed superiorly to make sure notching would not occur, and the 4-in-1 cutting block was pinned in position with trocar pins.   An oscillating saw was used flexion. The knee was fully flexed. The tibia was sized to a size E tibial tray. The tray was rotated externally to match the natural external rotation of the tibial tubercle and the tibial crest and was pinned in position.   A size 9 femoral trial was t quadriceps-splitting medial parapatellar incision was closed with #1 Ethibond suture and Quill suture. Deep tissues were closed with 0 and 2-0 Vicryl suture. Skin was closed with staples. An Aquacel dressing and sterile dressing was applied.   [facility]

## 2020-06-22 NOTE — PROGRESS NOTES
Pain controlled with Norco. Denies numbness/tingling to LLE. Aquacel dressing CDI. VSS on RA. IS encouraged. SCDs on bilaterally. Voiding w/o difficulty, will get PVR as ordered by nephro. PT to see today, possibly home today with [facility] if clears PT.  Fall pr

## 2020-06-22 NOTE — PHYSICAL THERAPY NOTE
PHYSICAL THERAPY KNEE TREATMENT NOTE - INPATIENT     Room Number: 222/901-A     Session: 2  Number of Visits to Meet Established Goals: 7    Presenting Problem: (S/P  LT TKR on 6/19/2020 by )    Problem List  Active Problems:    GIBSON (acute kidney rest  Management Techniques:  Activity promotion    BALANCE  Static Sitting: Fair -  Dynamic Sitting: Fair -  Static Standing: Poor +  Dynamic Standing: Poor +    ACTIVITY TOLERANCE                         O2 WALK                  AM-PAC '6-Clicks' INPATIEN RW and CGA. Pt demos decreased joe and step length, cues for WBAT, step length, and proper integration of RW. Pt educated on sequencing for stairs and performed 2\" step with RW and mod A, progressing to min A on second attempt.  Pt returned to sit c s Daily    CURRENT GOALS     Goal #1     Patient is able to demonstrate supine - sit EOB @ level: CGAx1    Goal #2     Patient is able to demonstrate transfers Sit to/from Stand at assistance level: CGAx1    Goal #3     Patient is able to ambulate 200 feet wi

## 2020-06-22 NOTE — PROGRESS NOTES
[facility]  Progress Note     Mandie Villalba Patient Status:  Outpatient in a Bed    3/11/1940 MRN [id number]   [facility] 3SW-A Attending Mana Dhillon MD   Hosp Day # 0 PCP Chester Stevens MD     Chief Complaint: s/p tkr Epic.    Medications:   • Senna  17.2 mg Oral Nightly   • docusate sodium  100 mg Oral BID   • ferrous sulfate  325 mg Oral Daily with breakfast   • apixaban  2.5 mg Oral [email or web address]       ASSESSMENT / PLAN:     3 79years old female presents for left t

## 2020-06-23 PROCEDURE — 30233H1 TRANSFUSION OF NONAUTOLOGOUS WHOLE BLOOD INTO PERIPHERAL VEIN, PERCUTANEOUS APPROACH: ICD-10-PCS | Performed by: ORTHOPAEDIC SURGERY

## 2020-06-23 PROCEDURE — 99233 SBSQ HOSP IP/OBS HIGH 50: CPT | Performed by: HOSPITALIST

## 2020-06-23 PROCEDURE — 99232 SBSQ HOSP IP/OBS MODERATE 35: CPT | Performed by: INTERNAL MEDICINE

## 2020-06-23 RX ORDER — SODIUM CHLORIDE 9 MG/ML
INJECTION, SOLUTION INTRAVENOUS ONCE
Status: DISCONTINUED | OUTPATIENT
Start: 2020-06-23 | End: 2020-06-24

## 2020-06-23 NOTE — PROGRESS NOTES
[address] Patient Status:  Inpatient    3/11/1940 MRN [id number]   [facility] 3SW-A Attending Florentin Carmona MD   Hosp Day # 0 PCP Nicole Blizzard, MD     Louie Kelsey is a 79year old female patient.     Alex Medications   Medication Sig Dispense Refill   • HYDROcodone-acetaminophen  MG Oral Tab Take 1-2 tablets by mouth every 4 (four) hours as needed.  60 tablet 0         Codeine                 NAUSEA AND VOMITING    Comment:\"derivatives\"    Principal

## 2020-06-23 NOTE — PROGRESS NOTES
[facility]  Progress Note     Kay Kaufman Patient Status:  Inpatient    3/11/1940 MRN [id number]   [facility] 3SW-A Attending Pierre Chand MD   Hosp Day # 1 PCP Toño Kohler MD     Chief Complaint: s/p tkr    S: Patient Imaging: Imaging data reviewed in Epic.     Medications:   • sodium chloride   Intravenous Once   • Senna  17.2 mg Oral Nightly   • docusate sodium  100 mg Oral BID   • ferrous sulfate  325 mg Oral Daily with breakfast   • apixaban  2.5 mg Oral [email or web address]

## 2020-06-23 NOTE — CM/SW NOTE
Spoke with Alok Calzada from [facility] who confirmed they can provide commode. He will contact pt/family to arrange for delivery to pt's home later today. / to remain available for support and/or discharge planning.      Vaughn Bond

## 2020-06-23 NOTE — PROGRESS NOTES
[facility]  Nephrology Progress Note    Coleen Norman Attending:  Felecia Brooks MD       Assessment and Plan:    1) GIBSON- due to intravascular volume depletion / post-op fluid shifts after TKA; no other clear insults and is improving with IVF.  Hemo HCT 24.3 06/23/2020    .0 06/23/2020    CREATSERUM 1.72 06/23/2020    BUN 37 06/23/2020     06/23/2020    K 5.2 06/23/2020     06/23/2020    CO2 25.0 06/23/2020     06/23/2020    CA 7.5 06/23/2020       Imaging:   All imaging st

## 2020-06-23 NOTE — PLAN OF CARE
Pain to surgical site minimal. Declined need for medication. VSS, denies feeling dizzy or lightheaded. Monitoring Hg, CBC ordered for AM. Aquacel dsg cdi, calf soft, able to dorsi/plantar flex both ankles without calf pain.  Instructed use of IS and ankle p

## 2020-06-23 NOTE — PHYSICAL THERAPY NOTE
PHYSICAL THERAPY KNEE TREATMENT NOTE - INPATIENT     Room Number: 955/938-A     Session: 3  Number of Visits to Meet Established Goals: 7    Presenting Problem: (S/P  LT TKR on 6/19/2020 by )    Problem List  Principal Problem:    Pre-op testing as Tolerated    PAIN ASSESSMENT   Ratin  Location: L knee  Management Techniques: Breathing techniques; Body mechanics; Activity promotion    BALANCE  Static Sitting: Fair -  Dynamic Sitting: Fair -  Static Standing: Fair -  Dynamic Standing: Poor +    A EOB supervision. Mod A sit>sup for BLE. Pt left in bed, needs met, RN present to provide blood transfusion. Knee ROM      L Knee Flexion (degrees): 80     L Knee Extension (degrees): 0    Patient End of Session: In bed; With [facility] staff;Needs met;Call l

## 2020-06-24 VITALS
HEART RATE: 64 BPM | OXYGEN SATURATION: 100 % | BODY MASS INDEX: 41.22 KG/M2 | DIASTOLIC BLOOD PRESSURE: 55 MMHG | TEMPERATURE: 98 F | SYSTOLIC BLOOD PRESSURE: 134 MMHG | HEIGHT: 62 IN | RESPIRATION RATE: 20 BRPM | WEIGHT: 224 LBS

## 2020-06-24 PROCEDURE — 99232 SBSQ HOSP IP/OBS MODERATE 35: CPT | Performed by: INTERNAL MEDICINE

## 2020-06-24 PROCEDURE — 99233 SBSQ HOSP IP/OBS HIGH 50: CPT | Performed by: HOSPITALIST

## 2020-06-24 NOTE — PHYSICAL THERAPY NOTE
PHYSICAL THERAPY KNEE TREATMENT NOTE - INPATIENT     Room Number: 470/941-K     Session: 3  Number of Visits to Meet Established Goals: 7    Presenting Problem: (S/P  LT TKR on 6/19/2020 by )    Problem List  Principal Problem:    Pre-op testing Rating: Unable to rate  Location: L knee  Management Techniques: Breathing techniques; Body mechanics; Activity promotion    BALANCE  Static Sitting: Fair  Dynamic Sitting: Fair -  Static Standing: Fair -  Dynamic Standing: Poor +    ACTIVITY TOLERANCE met;Call light within reach;RN aware of session/findings; All patient questions and concerns addressed;SCDs in place; Ice applied    ASSESSMENT   Pt continues to present with impaired strength and decreased ROM LLE , decreased endurance and impaired balance

## 2020-06-24 NOTE — PROGRESS NOTES
[facility]  Nephrology Progress Note    Nicolas Fink Attending:  Bo Carrero MD       Assessment and Plan:    1) GIBSON- due to intravascular volume depletion / post-op fluid shifts after TKA; no other clear insults and is improving with IVF.  Hemo Labs:   Lab Results   Component Value Date    HGB 8.6 06/23/2020       Imaging: All imaging studies reviewed.     Meds:   0.9% NaCl infusion, , Intravenous, Once  ondansetron HCl (ZOFRAN) injection 4 mg, 4 mg, Intravenous, Q6H PRN  HYDROcodone-acetamin

## 2020-06-24 NOTE — PROGRESS NOTES
Pt cleared by all MD's for discharge. Discharge education completed at bedside with pt and spouse, all questions answered. PIV removed, belongings packed. Stuart Cherry delivered via meds to bed to patient. Pt discharging to via wheelchair.

## 2020-06-24 NOTE — CONSULTS
[facility]  Report of Consultation    Kay Kaufman Patient Status:  Inpatient    3/11/1940 MRN [id number]   [facility] 3SW-A Attending Pierre Chand MD   Hosp Day # 2 PCP Toño Kohler MD     Reason for Consultation:  Heme (+ Option of not having the procedure or further evaluation was also discussed. The patient demonstrated understanding.     We discussed risks of long term PPI use with potential issues of calcium absorption, bone fractures, other issues of absorption as well ANGIOGRAM     • ANGIOPLASTY (CORONARY)      stent in L ant descend   • ARTHROTOMY,OPEN REPAIR MENISCUS Right    •       x 3   • CATH DRUG ELUTING STENT      x 2   • CATH PERCUTANEOUS  TRANSLUMINAL CORONARY ANGIOPLASTY     • COLONOSCOPY  / mg, Oral, Daily with breakfast  apixaban (ELIQUIS) tab 2.5 mg, 2.5 mg, Oral, [email or web address]  LORazepam (ATIVAN) tab 0.5 mg, 0.5 mg, Oral, Daily PRN        No current facility-administered medications on file prior to encounter.    acetaminophen 500 MG Oral T depression or anxiety except as stated above  HEMATOLOGY: denies bruising or excessive bleeding except as stated  ENDOCRINE: denies unexpected wt gain or wt loss except as stated  ALLERGY/IMM. :medication allergies as above        PHYSICAL EXAM   /55 deficiency anemia  2 acute blood loss anemia  3 GERD  4 heme (+) stool      She reports being told there was bleeding during surgery.  Also had hydration after the GIBSON and may have component of dilution    Her chronic hg is low since 8/2018 (was 11.5) and i

## 2020-06-24 NOTE — PLAN OF CARE
Alert and oriented x4. VSS on RA. IS encouraged and demonstrated. SCDs on. Tolerating diet, denies nausea. Voiding freely. BM today. [facility] GI consulted, will f/u outpatient. Pain controlled. Gel ice. Aquacel to L knee dry and intact, old drainage noted.  Non-

## 2020-06-24 NOTE — PROGRESS NOTES
[facility]  Progress Note     Coleen Emerson Patient Status:  Inpatient    3/11/1940 MRN [id number]   [facility] 3SW-A Attending Felecia Brooks MD   Hosp Day # 2 PCP Rosana Horton MD     Chief Complaint: tkr    S: Patient Jw Christopher INR in the last 168 hours. No results for input(s): TROP, CK in the last 168 hours. Imaging: Imaging data reviewed in Epic.     Medications:   • sodium chloride   Intravenous Once   • Senna  17.2 mg Oral Nightly   • docusate sodium  100 mg Oral B

## 2020-06-24 NOTE — CM/SW NOTE
06/19/20 1300   Discharge disposition   Expected discharge disposition Home-Health   Name of Amandeep Proc. Jah Lebron 1 services after discharge Skilled home care;DME   HME provider [facility]   Discharge transportation [facility] acute hospital

## 2020-06-24 NOTE — PLAN OF CARE
Patient alert and oriented ,gets up with min assist with walker,denies any pain now ,dressing to left knee with some old drainage ,ice in place ,denies any chest pain or calf pain ,no short of breath ,vital signs stable ,refused for CPM states it makes her

## 2020-06-24 NOTE — CONSULTS
[facility]                       Gastroenterology [address] [facility] Gastroenterology    [facility] Patient Status:  Inpatient    3/11/1940 MRN [id number]   [facility] 3SW-A Attending MD Sophia Ritter whipple and then surgery a few days later for an infection   • OTHER SURGICAL HISTORY      2 stents   • SHOULDER SURG PROC UNLISTED      R shoulder       Medications: 0.9% NaCl infusion, , Intravenous, Once  ondansetron HCl (ZOFRAN) injection 4 mg, 4 mg sodium chloride 0.9% 100 mL IRRIGATION ONLY (NOT FOR IV USE), 3,000 mg, Irrigation, Once (Intra-Op)        Allergies:   Codeine                 NAUSEA AND VOMITING    Comment:\"derivatives\"    SocHx:  No history of smoking;   The patient drinks alcohol on PERRL, oropharynx is clear with moist mucosal membranes    Eyes: Sclerae are anicteric    Neck:  Supple without nuchal rigidity;  No lymphadenopathy    CV: Regular rate and rhythm, with normal S1 and S2; No S3; No murmurs    Resp: Clear to auscultation bila had their prior care with him    GI will sign off at this time. Please call back with any questions or concerns.        Yesica Jay MD  9:01 AM  6/24/2020  [facility]  [phone]

## 2020-06-24 NOTE — OCCUPATIONAL THERAPY NOTE
OCCUPATIONAL THERAPY TREATMENT NOTE - INPATIENT     Room Number: 500/759-R  Session: 3/4  Number of Visits to Meet Established Goals: 4    Presenting Problem: s/p L TKA on 6/19     History related to current admission:  Admitted for elective L TKA on 6/19/ RESTRICTION  Weight Bearing Restriction: L lower extremity           L Lower Extremity: Weight Bearing as Tolerated    PAIN ASSESSMENT  Rating: Unable to rate  Location: (LLE)  Management Techniques: Relaxation;Repositioning     ACTIVITY TOLERANCE to standing again. Understanding voiced. Patient [facility] from [facility] to [facility] with supervision. LEs were elevated, ice applied. Nurse updated on session.   Patient End of Session: Up in chair;Needs met;Call light within reach;RN aware of session/finding

## 2020-06-25 ENCOUNTER — TELEPHONE (OUTPATIENT)
Dept: FAMILY MEDICINE CLINIC | Facility: CLINIC | Age: 80
End: 2020-06-25

## 2020-06-25 ENCOUNTER — PATIENT OUTREACH (OUTPATIENT)
Dept: CASE MANAGEMENT | Age: 80
End: 2020-06-25

## 2020-06-25 ENCOUNTER — TELEPHONE (OUTPATIENT)
Dept: NEPHROLOGY | Facility: CLINIC | Age: 80
End: 2020-06-25

## 2020-06-25 DIAGNOSIS — M17.0 OSTEOARTHRITIS OF BOTH KNEES, UNSPECIFIED OSTEOARTHRITIS TYPE: ICD-10-CM

## 2020-06-25 DIAGNOSIS — D63.1 ANEMIA DUE TO STAGE 3 CHRONIC KIDNEY DISEASE (HCC): ICD-10-CM

## 2020-06-25 DIAGNOSIS — N18.30 ANEMIA DUE TO STAGE 3 CHRONIC KIDNEY DISEASE (HCC): ICD-10-CM

## 2020-06-25 DIAGNOSIS — N18.30 CKD (CHRONIC KIDNEY DISEASE) STAGE 3, GFR 30-59 ML/MIN (HCC): Primary | ICD-10-CM

## 2020-06-25 DIAGNOSIS — N17.9 AKI (ACUTE KIDNEY INJURY) (HCC): ICD-10-CM

## 2020-06-25 DIAGNOSIS — Z02.9 ENCOUNTERS FOR UNSPECIFIED ADMINISTRATIVE PURPOSE: ICD-10-CM

## 2020-06-25 PROCEDURE — 1111F DSCHRG MED/CURRENT MED MERGE: CPT

## 2020-06-25 NOTE — PROGRESS NOTES
Initial Post Discharge Follow Up   Discharge Date: 6/24/20  Contact Date: 6/25/2020    Consent Verification:  Assessment Completed With: Patient  HIPAA Verified?   Yes    Discharge Dx:     S/P left total knee arthroplasty  Anemia   Hypothyroidism   Diabe yes   If Yes:  o Where: left leg    Rating on pain scale 1-10, 10 being the worst pain you have ever experienced 2-3/10  o Alleviating factors: Rest, elevation and Ice  o Is the pain manageable at home?  yes  • How well was your pain managed while in the [facility] every 4 (four) hours as needed.  60 tablet 0   • ELIQUIS 5 MG Oral Tab TAKE 1 TABLET BY MOUTH TWICE A DAY 60 tablet 1   • LORAZEPAM 0.5 MG Oral Tab TAKE 1 TABLET BY MOUTH EVERY DAY AS NEEDED 30 tablet 0   • Levothyroxine Sodium 75 MCG Oral Tab Take 1 tablet walker, commode (this was delivered yesterday)  Have you received your (DME)? yes; The patient has been completing deep breathing exercises every hour as directed.      Ortho Spine:  Has there been a change in your incision/wound since d/c?  no; The [facility]ie [facility])    Jul 08, 2020 10:30 AM CDT Rehabilitation with [facility] [address] [facility] ([address], [place])        Jul 09, 2020 10:15 AM CDT POSTOP with Cady Valles MD [address] [place] [address] PHYSICAL THERAPY - [place] ([address], [place])        Aug 05, 2020 10:00 AM CDT MEDICARE REHAB with Sharmila Coombs, PT [address] [facility] ([address], [place])        Aug 06, 2020 10:00 AM CDT ME the patient with the reccommended GI and nephrology FU per patient request.     Is there any reason as to why you cannot make your appointments? No     NCM Reviewed upcoming Specialist Appt with patient     Yes;  The patient is scheduled with Dr. Clyde Fleischer on

## 2020-06-25 NOTE — TELEPHONE ENCOUNTER
Lm to CB. Please see other note for pt. She needs  a TCM with Dr. Mariaelena Frey. He would like her to come on Tuesday June 30th at 4:00 Pm.

## 2020-06-25 NOTE — TELEPHONE ENCOUNTER
Pt was discharged and she is unclear on her instructions regarding her medications. She was told to stop a medication and she can not tell by her instructions or by what they tole her id she should stop her Lisinopril or her sertraline. I reviewed the chart notes and the discharge summary. There were no instructions on discontinuing either of these meds. I spoke with Dr. Otto Holden. He advised the patient continue taking both medications. She was instructed to monitor her blood pressure and call the office if her blood pressure drops below 100/60. Pt was instructed to bring her BP readings with her to the appointment she had on 06/30/20 with Dr. Otto Holden at 4:00 PM. Pt agreed to plan and verbalized understanding.

## 2020-06-25 NOTE — PROGRESS NOTES
The patient is requesting assistance in scheduling with the following specialists:     GI:     Call Keya Desir MD [address] [facility]  2 TRANS AM [place] [address] [phone]    or    Follow up with Idania Singletary,

## 2020-06-25 NOTE — TELEPHONE ENCOUNTER
Spoke to pt for TCM today to follow up on recent hospitalization and left total knee replacement. Pt does not have HFU appt scheduled at this time. TCM/HFU appt recommended by 7/8/2020 as pt is a moderate risk for readmission.  The patient is scheduled wi

## 2020-06-25 NOTE — TELEPHONE ENCOUNTER
Pt is supposed to stop taking a medication and she is unsure which one it is. She was just released from the hospital yesterday and doesn't remember. She sounded very concerned that she speak to someone quickly.

## 2020-06-25 NOTE — DISCHARGE SUMMARY
Discharge Summary  Patient ID:  Alia Marmolejo  [id number]  49 year old  3/11/1940    Admit date: 6/19/2020    Discharge date and time: 6/24/2020    Attending Physician: KAREN Brizuela Fears    Reason for admission: Primary osteoarthritis of left knee [M17.12]      [facility]

## 2020-06-25 NOTE — TELEPHONE ENCOUNTER
Spoke to [place] and patient will be calling to make an appt but she will need labs. What labs would you like done ?

## 2020-06-28 DIAGNOSIS — F32.A ANXIETY AND DEPRESSION: ICD-10-CM

## 2020-06-28 DIAGNOSIS — F41.9 ANXIETY AND DEPRESSION: ICD-10-CM

## 2020-06-30 ENCOUNTER — OFFICE VISIT (OUTPATIENT)
Dept: FAMILY MEDICINE CLINIC | Facility: CLINIC | Age: 80
End: 2020-06-30
Payer: MEDICARE

## 2020-06-30 ENCOUNTER — LAB ENCOUNTER (OUTPATIENT)
Dept: LAB | Age: 80
End: 2020-06-30
Attending: INTERNAL MEDICINE
Payer: MEDICARE

## 2020-06-30 VITALS
WEIGHT: 233 LBS | OXYGEN SATURATION: 98 % | SYSTOLIC BLOOD PRESSURE: 110 MMHG | BODY MASS INDEX: 42.88 KG/M2 | HEIGHT: 62 IN | TEMPERATURE: 99 F | RESPIRATION RATE: 18 BRPM | HEART RATE: 68 BPM | DIASTOLIC BLOOD PRESSURE: 78 MMHG

## 2020-06-30 DIAGNOSIS — D63.1 ANEMIA DUE TO STAGE 3 CHRONIC KIDNEY DISEASE (HCC): ICD-10-CM

## 2020-06-30 DIAGNOSIS — N18.30 ANEMIA DUE TO STAGE 3 CHRONIC KIDNEY DISEASE (HCC): ICD-10-CM

## 2020-06-30 DIAGNOSIS — E78.2 MIXED HYPERLIPIDEMIA: ICD-10-CM

## 2020-06-30 DIAGNOSIS — I35.0 AORTIC STENOSIS, MODERATE: ICD-10-CM

## 2020-06-30 DIAGNOSIS — Z86.718 HISTORY OF DVT OF LOWER EXTREMITY: ICD-10-CM

## 2020-06-30 DIAGNOSIS — N17.9 AKI (ACUTE KIDNEY INJURY) (HCC): ICD-10-CM

## 2020-06-30 DIAGNOSIS — E03.9 HYPOTHYROIDISM, UNSPECIFIED TYPE: ICD-10-CM

## 2020-06-30 DIAGNOSIS — Z96.652 STATUS POST LEFT KNEE REPLACEMENT: Primary | ICD-10-CM

## 2020-06-30 DIAGNOSIS — I10 ESSENTIAL HYPERTENSION: ICD-10-CM

## 2020-06-30 DIAGNOSIS — N18.30 CKD (CHRONIC KIDNEY DISEASE) STAGE 3, GFR 30-59 ML/MIN (HCC): ICD-10-CM

## 2020-06-30 DIAGNOSIS — D64.9 POSTOPERATIVE ANEMIA: ICD-10-CM

## 2020-06-30 DIAGNOSIS — I25.10 CORONARY ARTERY DISEASE INVOLVING NATIVE HEART WITHOUT ANGINA PECTORIS, UNSPECIFIED VESSEL OR LESION TYPE: ICD-10-CM

## 2020-06-30 DIAGNOSIS — E11.9 TYPE 2 DIABETES MELLITUS WITHOUT COMPLICATION, WITHOUT LONG-TERM CURRENT USE OF INSULIN (HCC): ICD-10-CM

## 2020-06-30 LAB
ANION GAP SERPL CALC-SCNC: 6 MMOL/L (ref 0–18)
BASOPHILS # BLD AUTO: 0.05 X10(3) UL (ref 0–0.2)
BASOPHILS NFR BLD AUTO: 0.5 %
BUN BLD-MCNC: 26 MG/DL (ref 7–18)
BUN/CREAT SERPL: 14.6 (ref 10–20)
CALCIUM BLD-MCNC: 7.7 MG/DL (ref 8.5–10.1)
CHLORIDE SERPL-SCNC: 107 MMOL/L (ref 98–112)
CO2 SERPL-SCNC: 28 MMOL/L (ref 21–32)
CREAT BLD-MCNC: 1.78 MG/DL (ref 0.55–1.02)
DEPRECATED RDW RBC AUTO: 56.7 FL (ref 35.1–46.3)
EOSINOPHIL # BLD AUTO: 0.35 X10(3) UL (ref 0–0.7)
EOSINOPHIL NFR BLD AUTO: 3.8 %
ERYTHROCYTE [DISTWIDTH] IN BLOOD BY AUTOMATED COUNT: 17.1 % (ref 11–15)
GLUCOSE BLD-MCNC: 98 MG/DL (ref 70–99)
HCT VFR BLD AUTO: 33.1 % (ref 35–48)
HGB BLD-MCNC: 9.9 G/DL (ref 12–16)
IMM GRANULOCYTES # BLD AUTO: 0.07 X10(3) UL (ref 0–1)
IMM GRANULOCYTES NFR BLD: 0.8 %
LYMPHOCYTES # BLD AUTO: 1.15 X10(3) UL (ref 1–4)
LYMPHOCYTES NFR BLD AUTO: 12.6 %
MCH RBC QN AUTO: 27.4 PG (ref 26–34)
MCHC RBC AUTO-ENTMCNC: 29.9 G/DL (ref 31–37)
MCV RBC AUTO: 91.7 FL (ref 80–100)
MONOCYTES # BLD AUTO: 0.56 X10(3) UL (ref 0.1–1)
MONOCYTES NFR BLD AUTO: 6.1 %
NEUTROPHILS # BLD AUTO: 6.97 X10 (3) UL (ref 1.5–7.7)
NEUTROPHILS # BLD AUTO: 6.97 X10(3) UL (ref 1.5–7.7)
NEUTROPHILS NFR BLD AUTO: 76.2 %
OSMOLALITY SERPL CALC.SUM OF ELEC: 297 MOSM/KG (ref 275–295)
PATIENT FASTING Y/N/NP: YES
PLATELET # BLD AUTO: 372 10(3)UL (ref 150–450)
POTASSIUM SERPL-SCNC: 4.8 MMOL/L (ref 3.5–5.1)
RBC # BLD AUTO: 3.61 X10(6)UL (ref 3.8–5.3)
SODIUM SERPL-SCNC: 141 MMOL/L (ref 136–145)
WBC # BLD AUTO: 9.2 X10(3) UL (ref 4–11)

## 2020-06-30 PROCEDURE — 80048 BASIC METABOLIC PNL TOTAL CA: CPT

## 2020-06-30 PROCEDURE — 85025 COMPLETE CBC W/AUTO DIFF WBC: CPT

## 2020-06-30 PROCEDURE — 36415 COLL VENOUS BLD VENIPUNCTURE: CPT

## 2020-06-30 PROCEDURE — 1111F DSCHRG MED/CURRENT MED MERGE: CPT | Performed by: FAMILY MEDICINE

## 2020-06-30 PROCEDURE — 99495 TRANSJ CARE MGMT MOD F2F 14D: CPT | Performed by: FAMILY MEDICINE

## 2020-06-30 NOTE — PROGRESS NOTES
HPI:    Ashlie Agudelo is a 79year old female here today for hospital follow up.    She was discharged from Inpatient hospital, [facility] to Home   Admission Date: 6/19/20   Discharge Date: 6/24/20  Hospital Discharge Diagnoses (since 5/31/2020) the time of her visit. Allergies:  She is allergic to codeine. Current Meds:  atorvastatin 10 MG Oral Tab, Take 10 mg by mouth nightly. furosemide 20 MG Oral Tab, Take 20 mg by mouth daily.   Levothyroxine Sodium 200 MCG Oral Tab, Take 200 mcg by carlos history includes Cancer in her brother and father; Heart Disorder in her brother and sister; Lipids in her daughter. She  reports that she has never smoked. She has never used smokeless tobacco. She reports current alcohol use.  She reports that she does motor and sensory are grossly intact    ASSESSMENT/ PLAN:   Diagnoses and all orders for this visit:    Status post left knee replacement    Postoperative anemia    Essential hypertension    Hypothyroidism, unspecified type    Type 2 diabetes mellitus with

## 2020-07-02 ENCOUNTER — TELEPHONE (OUTPATIENT)
Dept: NEPHROLOGY | Facility: CLINIC | Age: 80
End: 2020-07-02

## 2020-07-04 ENCOUNTER — HOSPITAL ENCOUNTER (EMERGENCY)
Age: 80
Discharge: HOME | End: 2020-07-04
Attending: EMERGENCY MEDICINE
Payer: MEDICARE

## 2020-07-04 VITALS
SYSTOLIC BLOOD PRESSURE: 111 MMHG | BODY MASS INDEX: 43 KG/M2 | DIASTOLIC BLOOD PRESSURE: 45 MMHG | TEMPERATURE: 98 F | RESPIRATION RATE: 18 BRPM | OXYGEN SATURATION: 100 % | HEART RATE: 94 BPM | WEIGHT: 233 LBS

## 2020-07-04 DIAGNOSIS — R04.0 EPISTAXIS: Primary | ICD-10-CM

## 2020-07-04 PROCEDURE — 99283 EMERGENCY DEPT VISIT LOW MDM: CPT

## 2020-07-04 PROCEDURE — 99284 EMERGENCY DEPT VISIT MOD MDM: CPT

## 2020-07-04 PROCEDURE — 30903 CONTROL OF NOSEBLEED: CPT

## 2020-07-04 RX ORDER — CEPHALEXIN 500 MG/1
500 CAPSULE ORAL 3 TIMES DAILY
Qty: 15 CAPSULE | Refills: 0 | Status: SHIPPED | OUTPATIENT
Start: 2020-07-04 | End: 2020-07-09

## 2020-07-04 RX ORDER — TRANEXAMIC ACID 100 MG/ML
5 INJECTION, SOLUTION INTRAVENOUS ONCE
Status: COMPLETED | OUTPATIENT
Start: 2020-07-04 | End: 2020-07-04

## 2020-07-04 NOTE — ED PROVIDER NOTES
Patient Seen in: [facility] In [place]      History   Patient presents with:  Nose Bleed    Stated Complaint: bloody nose x 30 min; patient on blood thinners    HPI    21-year-old female presents to the emergency department with epist Smokeless tobacco: Never Used    Alcohol use: Yes      Frequency: Monthly or less      Comment: occ    Drug use: No             Review of Systems   All other systems reviewed and are negative.       Positive for stated complaint: bloody nose x 30 min; patie combination of let and TXA placed on a cotton ball within her right nostril after clearing the passages of clot. She was reevaluated and had persistent bleeding. She had a Merocel placed and it was impregnated with some more TXA.   She did have some bleed

## 2020-07-04 NOTE — ED INITIAL ASSESSMENT (HPI)
pT HAS BLOODY NOSE THAT STARTED 30 MIN PTA TO ED. PT STATES SHE TAKES ELIQUIS AND PLAVIX, DENIES TRAUMA.

## 2020-07-07 ENCOUNTER — TELEPHONE (OUTPATIENT)
Dept: FAMILY MEDICINE CLINIC | Facility: CLINIC | Age: 80
End: 2020-07-07

## 2020-07-07 NOTE — TELEPHONE ENCOUNTER
Pt calling with updates:    Berry Winkler to ER on July 4 for a nose bleed that wouldn't stop. ER packed and cauterized nose. Pt has an appt on Friday with Dr. Clement ROBERTS. Pt is also starting PT on her knee.     Thursday she has an appt to see Dr. Maame Ordaz

## 2020-07-15 ENCOUNTER — APPOINTMENT (OUTPATIENT)
Dept: CARDIOLOGY | Age: 80
End: 2020-07-15

## 2020-07-18 DIAGNOSIS — E78.2 MIXED HYPERLIPIDEMIA: ICD-10-CM

## 2020-07-20 RX ORDER — ATORVASTATIN CALCIUM 10 MG/1
TABLET, FILM COATED ORAL
Qty: 90 TABLET | Refills: 0 | Status: SHIPPED | OUTPATIENT
Start: 2020-07-20 | End: 2020-10-15

## 2020-07-29 ENCOUNTER — HOSPITAL ENCOUNTER (OUTPATIENT)
Dept: GENERAL RADIOLOGY | Age: 80
Discharge: HOME | End: 2020-07-29
Attending: OTOLARYNGOLOGY
Payer: MEDICARE

## 2020-07-29 ENCOUNTER — LABORATORY ENCOUNTER (OUTPATIENT)
Dept: LAB | Age: 80
End: 2020-07-29
Attending: OTOLARYNGOLOGY
Payer: MEDICARE

## 2020-07-29 DIAGNOSIS — J32.0 CHRONIC MAXILLARY SINUSITIS: ICD-10-CM

## 2020-07-29 DIAGNOSIS — R04.0 EPISTAXIS: ICD-10-CM

## 2020-07-29 DIAGNOSIS — J34.89 NASAL SEPTAL PERFORATION: ICD-10-CM

## 2020-07-29 LAB
BILIRUB UR QL STRIP.AUTO: NEGATIVE
CLARITY UR REFRACT.AUTO: CLEAR
COLOR UR AUTO: YELLOW
GLUCOSE UR STRIP.AUTO-MCNC: NEGATIVE MG/DL
KETONES UR STRIP.AUTO-MCNC: NEGATIVE MG/DL
LEUKOCYTE ESTERASE UR QL STRIP.AUTO: NEGATIVE
NITRITE UR QL STRIP.AUTO: NEGATIVE
PH UR STRIP.AUTO: 5 [PH] (ref 4.5–8)
PROT UR STRIP.AUTO-MCNC: NEGATIVE MG/DL
RBC UR QL AUTO: NEGATIVE
SP GR UR STRIP.AUTO: 1.01 (ref 1–1.03)
UROBILINOGEN UR STRIP.AUTO-MCNC: <2 MG/DL

## 2020-07-29 PROCEDURE — 86255 FLUORESCENT ANTIBODY SCREEN: CPT

## 2020-07-29 PROCEDURE — 36415 COLL VENOUS BLD VENIPUNCTURE: CPT

## 2020-07-29 PROCEDURE — 81003 URINALYSIS AUTO W/O SCOPE: CPT

## 2020-07-29 PROCEDURE — 71046 X-RAY EXAM CHEST 2 VIEWS: CPT | Performed by: OTOLARYNGOLOGY

## 2020-07-29 PROCEDURE — 83876 ASSAY MYELOPEROXIDASE: CPT

## 2020-08-01 LAB
MYELOPEROX ANTIBODIES, IGG: 0 AU/ML
SERINE PROTEASE3, IGG: 22 AU/ML

## 2020-08-04 NOTE — PROGRESS NOTES
Please inform one level in her testing came back barely elevated rest of work up is negative, I would like her to see Dr Jackie Coats to re evaluate in office in 4 weeks

## 2020-08-04 NOTE — PROGRESS NOTES
LMTCB to inform pt per KO,     \"Please inform one level in her testing came back barely elevated rest of work up is negative, I would like her to see Dr Jackelyn Almazan to re evaluate in office in 4 weeks. \"

## 2020-08-07 ENCOUNTER — APPOINTMENT (OUTPATIENT)
Dept: CARDIOLOGY | Age: 80
End: 2020-08-07

## 2020-08-12 RX ORDER — APIXABAN 5 MG/1
TABLET, FILM COATED ORAL
Qty: 60 TABLET | Refills: 0 | Status: SHIPPED | OUTPATIENT
Start: 2020-08-12 | End: 2021-02-08

## 2020-08-12 NOTE — TELEPHONE ENCOUNTER
LOV: 6/30/2020 TCM  Last refill: 6/16/2020 60 tabs 1 refill    Please refill if appropriate. Thank you.

## 2020-08-13 DIAGNOSIS — F32.A ANXIETY AND DEPRESSION: ICD-10-CM

## 2020-08-13 DIAGNOSIS — F41.9 ANXIETY AND DEPRESSION: ICD-10-CM

## 2020-08-14 DIAGNOSIS — E03.9 HYPOTHYROIDISM, UNSPECIFIED TYPE: ICD-10-CM

## 2020-08-14 RX ORDER — LEVOTHYROXINE SODIUM 0.07 MG/1
75 TABLET ORAL
Qty: 90 TABLET | Refills: 0 | Status: SHIPPED | OUTPATIENT
Start: 2020-08-14 | End: 2020-11-09

## 2020-08-14 RX ORDER — SERTRALINE HYDROCHLORIDE 100 MG/1
TABLET, FILM COATED ORAL
Qty: 90 TABLET | Refills: 1 | Status: SHIPPED | OUTPATIENT
Start: 2020-08-14 | End: 2021-02-08

## 2020-08-23 DIAGNOSIS — F41.9 ANXIETY AND DEPRESSION: ICD-10-CM

## 2020-08-23 DIAGNOSIS — F32.A ANXIETY AND DEPRESSION: ICD-10-CM

## 2020-08-24 RX ORDER — LORAZEPAM 0.5 MG/1
TABLET ORAL
Qty: 30 TABLET | Refills: 0 | Status: SHIPPED | OUTPATIENT
Start: 2020-08-24 | End: 2020-12-08

## 2020-10-01 RX ORDER — LEVOTHYROXINE SODIUM 0.2 MG/1
200 TABLET ORAL
COMMUNITY

## 2020-10-01 RX ORDER — LEVOTHYROXINE SODIUM 0.07 MG/1
75 TABLET ORAL
COMMUNITY
Start: 2020-08-14

## 2020-10-02 ENCOUNTER — OFFICE VISIT (OUTPATIENT)
Dept: CARDIOLOGY | Age: 80
End: 2020-10-02

## 2020-10-02 VITALS
HEART RATE: 60 BPM | SYSTOLIC BLOOD PRESSURE: 138 MMHG | HEIGHT: 62 IN | WEIGHT: 231.9 LBS | BODY MASS INDEX: 42.68 KG/M2 | DIASTOLIC BLOOD PRESSURE: 45 MMHG

## 2020-10-02 DIAGNOSIS — Z95.5 S/P PRIMARY ANGIOPLASTY WITH CORONARY STENT: ICD-10-CM

## 2020-10-02 DIAGNOSIS — N28.9 RENAL INSUFFICIENCY: ICD-10-CM

## 2020-10-02 DIAGNOSIS — E78.00 PURE HYPERCHOLESTEROLEMIA: ICD-10-CM

## 2020-10-02 DIAGNOSIS — I35.0 AORTIC VALVE STENOSIS, ETIOLOGY OF CARDIAC VALVE DISEASE UNSPECIFIED: ICD-10-CM

## 2020-10-02 DIAGNOSIS — I65.23 ASYMPTOMATIC CAROTID ARTERY STENOSIS, BILATERAL: Primary | ICD-10-CM

## 2020-10-02 DIAGNOSIS — I35.0 NONRHEUMATIC AORTIC VALVE STENOSIS: ICD-10-CM

## 2020-10-02 PROCEDURE — 93000 ELECTROCARDIOGRAM COMPLETE: CPT | Performed by: INTERNAL MEDICINE

## 2020-10-02 PROCEDURE — 99214 OFFICE O/P EST MOD 30 MIN: CPT | Performed by: INTERNAL MEDICINE

## 2020-10-02 RX ORDER — HYDROCODONE BITARTRATE AND ACETAMINOPHEN 5; 325 MG/1; MG/1
1 TABLET ORAL EVERY 4 HOURS PRN
COMMUNITY
Start: 2020-07-09 | End: 2021-04-07

## 2020-10-02 RX ORDER — SERTRALINE HYDROCHLORIDE 100 MG/1
100 TABLET, FILM COATED ORAL DAILY
COMMUNITY
Start: 2020-08-14

## 2020-10-02 ASSESSMENT — ENCOUNTER SYMPTOMS
CHILLS: 0
WEIGHT LOSS: 0
HEMATOCHEZIA: 0
COUGH: 0
ALLERGIC/IMMUNOLOGIC COMMENTS: NO NEW FOOD ALLERGIES
WEIGHT GAIN: 1
FEVER: 0
BRUISES/BLEEDS EASILY: 0
HEMOPTYSIS: 0
SUSPICIOUS LESIONS: 0

## 2020-10-02 ASSESSMENT — PATIENT HEALTH QUESTIONNAIRE - PHQ9
CLINICAL INTERPRETATION OF PHQ2 SCORE: NO FURTHER SCREENING NEEDED
1. LITTLE INTEREST OR PLEASURE IN DOING THINGS: NOT AT ALL
SUM OF ALL RESPONSES TO PHQ9 QUESTIONS 1 AND 2: 0
CLINICAL INTERPRETATION OF PHQ9 SCORE: NO FURTHER SCREENING NEEDED
SUM OF ALL RESPONSES TO PHQ9 QUESTIONS 1 AND 2: 0
2. FEELING DOWN, DEPRESSED OR HOPELESS: NOT AT ALL

## 2020-10-15 DIAGNOSIS — E78.2 MIXED HYPERLIPIDEMIA: ICD-10-CM

## 2020-10-15 RX ORDER — ATORVASTATIN CALCIUM 10 MG/1
TABLET, FILM COATED ORAL
Qty: 90 TABLET | Refills: 0 | Status: SHIPPED | OUTPATIENT
Start: 2020-10-15 | End: 2021-01-18

## 2020-10-20 ENCOUNTER — HOSPITAL ENCOUNTER (OUTPATIENT)
Dept: CV DIAGNOSTICS | Facility: HOSPITAL | Age: 80
Discharge: HOME | End: 2020-10-20
Attending: INTERNAL MEDICINE
Payer: MEDICARE

## 2020-10-20 DIAGNOSIS — I35.0 AORTIC VALVE STENOSIS, ETIOLOGY OF CARDIAC VALVE DISEASE UNSPECIFIED: ICD-10-CM

## 2020-10-20 DIAGNOSIS — E78.00 PURE HYPERCHOLESTEROLEMIA: ICD-10-CM

## 2020-10-20 DIAGNOSIS — N28.9 RENAL INSUFFICIENCY: ICD-10-CM

## 2020-10-20 PROCEDURE — 93306 TTE W/DOPPLER COMPLETE: CPT | Performed by: INTERNAL MEDICINE

## 2020-10-28 ENCOUNTER — TELEPHONE (OUTPATIENT)
Dept: FAMILY MEDICINE CLINIC | Facility: CLINIC | Age: 80
End: 2020-10-28

## 2020-10-28 NOTE — TELEPHONE ENCOUNTER
Pt called and stated she received a text message that she is due for her diabetic eye exam. Pt wanted to let us know that the apt is made in Jan.

## 2020-11-02 ENCOUNTER — TELEPHONE (OUTPATIENT)
Dept: CARDIOLOGY | Age: 80
End: 2020-11-02

## 2020-11-09 DIAGNOSIS — E03.9 HYPOTHYROIDISM, UNSPECIFIED TYPE: ICD-10-CM

## 2020-11-09 RX ORDER — LEVOTHYROXINE SODIUM 0.07 MG/1
75 TABLET ORAL
Qty: 90 TABLET | Refills: 0 | Status: SHIPPED | OUTPATIENT
Start: 2020-11-09 | End: 2021-02-03

## 2020-11-12 DIAGNOSIS — E03.9 HYPOTHYROIDISM, UNSPECIFIED: ICD-10-CM

## 2020-11-12 RX ORDER — LEVOTHYROXINE SODIUM 0.2 MG/1
TABLET ORAL
Qty: 90 TABLET | Refills: 1 | OUTPATIENT
Start: 2020-11-12

## 2020-11-30 ENCOUNTER — TELEPHONE (OUTPATIENT)
Dept: CARDIOLOGY | Age: 80
End: 2020-11-30

## 2020-12-02 DIAGNOSIS — F32.A ANXIETY AND DEPRESSION: ICD-10-CM

## 2020-12-02 DIAGNOSIS — F41.9 ANXIETY AND DEPRESSION: ICD-10-CM

## 2020-12-08 RX ORDER — LORAZEPAM 0.5 MG/1
TABLET ORAL
Qty: 30 TABLET | Refills: 0 | Status: SHIPPED | OUTPATIENT
Start: 2020-12-08 | End: 2021-06-10

## 2020-12-17 ENCOUNTER — OFFICE VISIT (OUTPATIENT)
Dept: FAMILY MEDICINE CLINIC | Facility: CLINIC | Age: 80
End: 2020-12-17
Payer: MEDICARE

## 2020-12-17 VITALS
BODY MASS INDEX: 40.3 KG/M2 | SYSTOLIC BLOOD PRESSURE: 126 MMHG | RESPIRATION RATE: 18 BRPM | TEMPERATURE: 98 F | HEART RATE: 61 BPM | WEIGHT: 219 LBS | DIASTOLIC BLOOD PRESSURE: 60 MMHG | HEIGHT: 62 IN

## 2020-12-17 DIAGNOSIS — R06.02 SHORTNESS OF BREATH: ICD-10-CM

## 2020-12-17 DIAGNOSIS — F32.A ANXIETY AND DEPRESSION: ICD-10-CM

## 2020-12-17 DIAGNOSIS — I35.0 AORTIC STENOSIS, MODERATE: ICD-10-CM

## 2020-12-17 DIAGNOSIS — I10 ESSENTIAL (PRIMARY) HYPERTENSION: ICD-10-CM

## 2020-12-17 DIAGNOSIS — E78.2 MIXED HYPERLIPIDEMIA: ICD-10-CM

## 2020-12-17 DIAGNOSIS — I25.10 CORONARY ARTERY DISEASE INVOLVING NATIVE CORONARY ARTERY OF NATIVE HEART WITHOUT ANGINA PECTORIS: ICD-10-CM

## 2020-12-17 DIAGNOSIS — Z00.00 ENCOUNTER FOR ANNUAL HEALTH EXAMINATION: Primary | ICD-10-CM

## 2020-12-17 DIAGNOSIS — Z86.718 HISTORY OF DVT OF LOWER EXTREMITY: ICD-10-CM

## 2020-12-17 DIAGNOSIS — F41.9 ANXIETY AND DEPRESSION: ICD-10-CM

## 2020-12-17 DIAGNOSIS — E87.79 OTHER HYPERVOLEMIA: ICD-10-CM

## 2020-12-17 DIAGNOSIS — E11.9 TYPE 2 DIABETES MELLITUS WITHOUT COMPLICATION, WITHOUT LONG-TERM CURRENT USE OF INSULIN (HCC): ICD-10-CM

## 2020-12-17 DIAGNOSIS — E03.9 HYPOTHYROIDISM, UNSPECIFIED TYPE: ICD-10-CM

## 2020-12-17 DIAGNOSIS — I10 ESSENTIAL HYPERTENSION: ICD-10-CM

## 2020-12-17 PROCEDURE — 99213 OFFICE O/P EST LOW 20 MIN: CPT | Performed by: FAMILY MEDICINE

## 2020-12-17 PROCEDURE — G0439 PPPS, SUBSEQ VISIT: HCPCS | Performed by: FAMILY MEDICINE

## 2020-12-17 RX ORDER — POTASSIUM CHLORIDE 750 MG/1
10 TABLET, FILM COATED, EXTENDED RELEASE ORAL DAILY
COMMUNITY
Start: 2020-09-13 | End: 2021-01-26

## 2020-12-17 RX ORDER — POTASSIUM CHLORIDE 750 MG/1
TABLET, FILM COATED, EXTENDED RELEASE ORAL
Qty: 90 TABLET | Refills: 1 | Status: SHIPPED | OUTPATIENT
Start: 2020-12-17 | End: 2021-06-21

## 2020-12-17 RX ORDER — LISINOPRIL 20 MG/1
TABLET ORAL
Qty: 90 TABLET | Refills: 1 | Status: SHIPPED | OUTPATIENT
Start: 2020-12-17 | End: 2021-06-21

## 2020-12-17 RX ORDER — ASPIRIN 81 MG/1
81 TABLET ORAL DAILY
COMMUNITY

## 2020-12-17 RX ORDER — PANTOPRAZOLE SODIUM 40 MG/1
TABLET, DELAYED RELEASE ORAL
Qty: 90 TABLET | Refills: 1 | Status: SHIPPED | OUTPATIENT
Start: 2020-12-17 | End: 2021-06-21

## 2020-12-17 NOTE — PROGRESS NOTES
HPI:   Yolanda Courser is a 79year old female who presents for a Medicare Subsequent Annual Wellness visit (Pt already had Initial Annual Wellness). Patient presents for recheck of her hypertension.  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        MEDICAL INFORMATION:   She  has a past medical history of Acute bronchitis, Anxiety state, Asthma, Cough, Disorder of thyroid, D[facility]deep [facility] thrombosis) [facility] Esophageal reflux, H/O mammogram (10/21/1999), Hep 2\" (1.575 m)   Wt 219 lb (99.3 kg)   BMI 40.06 kg/m²  Estimated body mass index is 40.06 kg/m² as calculated from the following:    Height as of this encounter: 5' 2\" (1.575 m). Weight as of this encounter: 219 lb (99.3 kg).    Wt Readings from Last 6 hypertension  Continue furosemide 20 mg 1 tablet daily  Continue lisinopril 20 mg 1 tablet daily  Continue metoprolol succinate 25 mg 1 tablet daily  - CBC WITH DIFFERENTIAL WITH PLATELET; Future  - Potassium Chloride ER 10 MEQ Oral Tab CR;  Take 10 mEq by The patient has this document but we do not have it in Epic, and patient is instructed to get our office a copy of it for scanning into Epic           PLAN:  The patient indicates understanding of these issues and agrees to the plan.     Return in 6 months all    PHQ-2 SCORE: 0        Advance Directives     Do you have a healthcare power of ?: No    Do you have a living will?: Yes     Please go to \"Cognitive Assessment\" under Medicare Assessment section in Charting, test patient and document.     Nancy Ann previous visit. No flowsheet data found. Pap and Pelvic      Pap: Every 3 yrs age 21-68 or Pap+HPV every 5 yrs age 33-67, age 72 and older at high risk There are no preventive care reminders to display for this patient.  Update gDecide if appl found.    Creatinine  Annually CREATININE (mg/dL)   Date Value   04/09/2014 1.23 (H)     Creatinine (mg/dL)   Date Value   06/30/2020 1.78 (H)    No flowsheet data found.     BUN  Annually BUN (mg/dL)   Date Value   06/30/2020 26 (H)   04/09/2014 24 (H)

## 2020-12-20 NOTE — PATIENT INSTRUCTIONS
Makenna Kwok's SCREENING SCHEDULE   Tests on this list are recommended by your physician but may not be covered, or covered at this frequency, by your insurer. Please check with your insurance carrier before scheduling to verify coverage.    PREVENTATI Limited to patients who meet one of the following criteria:   • Men who are 73-68 years old and have smoked more than 100 cigarettes in their lifetime   • Anyone with a family history    Colorectal Cancer Screening  Covered up to Age 76     Colonoscopy Scr 74, and as needed after 75 There are no preventive care reminders to display for this patient. Please get this Mammogram regularly   Immunizations      Influenza  Covered Annually No orders found for this or any previous visit.  Please get every year    Pne available in [address])  [email or web address]  This link also has information from the [address] [place] [address] regarding Advance Directives.

## 2021-01-06 RX ORDER — METOPROLOL SUCCINATE 25 MG/1
TABLET, EXTENDED RELEASE ORAL
Qty: 90 TABLET | Refills: 3 | Status: SHIPPED | OUTPATIENT
Start: 2021-01-06

## 2021-01-11 DIAGNOSIS — E03.9 HYPOTHYROIDISM, UNSPECIFIED TYPE: Primary | ICD-10-CM

## 2021-01-11 RX ORDER — LEVOTHYROXINE SODIUM 0.2 MG/1
TABLET ORAL
Qty: 90 TABLET | Refills: 0 | Status: SHIPPED | OUTPATIENT
Start: 2021-01-11 | End: 2021-04-08

## 2021-01-11 RX ORDER — CLOPIDOGREL BISULFATE 75 MG/1
TABLET ORAL
Qty: 90 TABLET | Refills: 0 | Status: SHIPPED | OUTPATIENT
Start: 2021-01-11 | End: 2021-04-07 | Stop reason: CLARIF

## 2021-01-16 DIAGNOSIS — E78.2 MIXED HYPERLIPIDEMIA: ICD-10-CM

## 2021-01-18 RX ORDER — ATORVASTATIN CALCIUM 10 MG/1
TABLET, FILM COATED ORAL
Qty: 90 TABLET | Refills: 1 | Status: SHIPPED | OUTPATIENT
Start: 2021-01-18 | End: 2021-07-19

## 2021-01-26 ENCOUNTER — HOSPITAL ENCOUNTER (OUTPATIENT)
Dept: ULTRASOUND IMAGING | Age: 81
Discharge: HOME | End: 2021-01-26
Attending: FAMILY MEDICINE
Payer: MEDICARE

## 2021-01-26 ENCOUNTER — OFFICE VISIT (OUTPATIENT)
Dept: FAMILY MEDICINE CLINIC | Facility: CLINIC | Age: 81
End: 2021-01-26
Payer: MEDICARE

## 2021-01-26 VITALS
SYSTOLIC BLOOD PRESSURE: 104 MMHG | HEART RATE: 80 BPM | HEIGHT: 62 IN | TEMPERATURE: 97 F | RESPIRATION RATE: 16 BRPM | WEIGHT: 230 LBS | BODY MASS INDEX: 42.33 KG/M2 | DIASTOLIC BLOOD PRESSURE: 52 MMHG

## 2021-01-26 DIAGNOSIS — M79.604 PAIN OF RIGHT LOWER EXTREMITY: ICD-10-CM

## 2021-01-26 DIAGNOSIS — M79.604 PAIN OF RIGHT LOWER EXTREMITY: Primary | ICD-10-CM

## 2021-01-26 DIAGNOSIS — R15.1 FECAL SMEARING: ICD-10-CM

## 2021-01-26 DIAGNOSIS — S46.002A INJURY OF LEFT ROTATOR CUFF, INITIAL ENCOUNTER: ICD-10-CM

## 2021-01-26 DIAGNOSIS — Z23 NEED FOR VACCINATION: ICD-10-CM

## 2021-01-26 PROCEDURE — 93971 EXTREMITY STUDY: CPT | Performed by: FAMILY MEDICINE

## 2021-01-26 PROCEDURE — 99214 OFFICE O/P EST MOD 30 MIN: CPT | Performed by: FAMILY MEDICINE

## 2021-01-28 ENCOUNTER — IMMUNIZATION (OUTPATIENT)
Dept: LAB | Facility: HOSPITAL | Age: 81
End: 2021-01-28
Attending: EMERGENCY MEDICINE
Payer: MEDICARE

## 2021-01-28 DIAGNOSIS — Z23 NEED FOR VACCINATION: Primary | ICD-10-CM

## 2021-01-28 PROCEDURE — 0011A SARSCOV2 VAC 100MCG/0.5ML IM: CPT

## 2021-01-28 NOTE — PROGRESS NOTES
Elizabeth Chávez is a 79year old female. HPI:   Patient is a 28-year-old female who has 3 major concerns today. First is that she has had pain in her right lower leg over the past 2 weeks. Seems worse over the past few days.   She denies chest pain or an History:   Diagnosis Date   • Acute bronchitis    • Anxiety state    • Asthma    • Cough    • Disorder of thyroid    • DVT (deep venous thrombosis) (HCC)     left   • Esophageal reflux    • H/O mammogram 10/21/1999   • Hepatitis 1970s   • High blood pressu

## 2021-01-30 DIAGNOSIS — E87.79 OTHER FLUID OVERLOAD: ICD-10-CM

## 2021-01-30 DIAGNOSIS — R06.02 SHORTNESS OF BREATH: ICD-10-CM

## 2021-02-01 RX ORDER — FUROSEMIDE 20 MG/1
TABLET ORAL
Qty: 90 TABLET | Refills: 1 | Status: SHIPPED | OUTPATIENT
Start: 2021-02-01 | End: 2021-07-29

## 2021-02-03 DIAGNOSIS — E03.9 HYPOTHYROIDISM, UNSPECIFIED TYPE: ICD-10-CM

## 2021-02-03 RX ORDER — LEVOTHYROXINE SODIUM 0.07 MG/1
75 TABLET ORAL
Qty: 90 TABLET | Refills: 0 | Status: SHIPPED | OUTPATIENT
Start: 2021-02-03 | End: 2021-04-30

## 2021-02-08 DIAGNOSIS — F41.9 ANXIETY AND DEPRESSION: ICD-10-CM

## 2021-02-08 DIAGNOSIS — F32.A ANXIETY AND DEPRESSION: ICD-10-CM

## 2021-02-08 RX ORDER — SERTRALINE HYDROCHLORIDE 100 MG/1
TABLET, FILM COATED ORAL
Qty: 90 TABLET | Refills: 1 | Status: SHIPPED | OUTPATIENT
Start: 2021-02-08 | End: 2021-09-07

## 2021-02-25 ENCOUNTER — IMMUNIZATION (OUTPATIENT)
Dept: LAB | Facility: HOSPITAL | Age: 81
End: 2021-02-25
Attending: EMERGENCY MEDICINE
Payer: MEDICARE

## 2021-02-25 DIAGNOSIS — Z23 NEED FOR VACCINATION: Primary | ICD-10-CM

## 2021-02-25 PROCEDURE — 0012A SARSCOV2 VAC 100MCG/0.5ML IM: CPT

## 2021-03-24 ENCOUNTER — TELEPHONE (OUTPATIENT)
Dept: FAMILY MEDICINE CLINIC | Facility: CLINIC | Age: 81
End: 2021-03-24

## 2021-03-24 NOTE — TELEPHONE ENCOUNTER
1. What are your symptoms? Back pain on left side         2. How long have you been having these symptoms? 1 week         3. Have you done anything already to treat your symptoms? Extra strength tylenol.          ADDITIONAL INFO:  Pt also returning call

## 2021-03-24 NOTE — TELEPHONE ENCOUNTER
[phone] spoke w pt-begins by reporting she fell 3/21/21-was outside on uneven ground and tripped falling to the ground. Denies any symptoms prior to fall. sts did not hit her head or lose consciousness but has had left arm pain ever since fall.  \"it's not b

## 2021-04-07 ENCOUNTER — OFFICE VISIT (OUTPATIENT)
Dept: CARDIOLOGY | Age: 81
End: 2021-04-07

## 2021-04-07 VITALS
WEIGHT: 234 LBS | SYSTOLIC BLOOD PRESSURE: 108 MMHG | HEIGHT: 62 IN | HEART RATE: 64 BPM | BODY MASS INDEX: 43.06 KG/M2 | DIASTOLIC BLOOD PRESSURE: 58 MMHG

## 2021-04-07 DIAGNOSIS — I65.23 ASYMPTOMATIC CAROTID ARTERY STENOSIS, BILATERAL: Primary | ICD-10-CM

## 2021-04-07 DIAGNOSIS — Z95.5 S/P PRIMARY ANGIOPLASTY WITH CORONARY STENT: ICD-10-CM

## 2021-04-07 DIAGNOSIS — E78.00 PURE HYPERCHOLESTEROLEMIA: ICD-10-CM

## 2021-04-07 DIAGNOSIS — I35.0 NONRHEUMATIC AORTIC VALVE STENOSIS: ICD-10-CM

## 2021-04-07 PROCEDURE — 99214 OFFICE O/P EST MOD 30 MIN: CPT | Performed by: INTERNAL MEDICINE

## 2021-04-07 ASSESSMENT — ENCOUNTER SYMPTOMS
COUGH: 0
SUSPICIOUS LESIONS: 0
FEVER: 0
CHILLS: 0
HEMATOCHEZIA: 0
WEIGHT LOSS: 0
BRUISES/BLEEDS EASILY: 0
WEIGHT GAIN: 0
ALLERGIC/IMMUNOLOGIC COMMENTS: NO NEW FOOD ALLERGIES
HEMOPTYSIS: 0

## 2021-04-07 ASSESSMENT — PATIENT HEALTH QUESTIONNAIRE - PHQ9
2. FEELING DOWN, DEPRESSED OR HOPELESS: NOT AT ALL
SUM OF ALL RESPONSES TO PHQ9 QUESTIONS 1 AND 2: 0
1. LITTLE INTEREST OR PLEASURE IN DOING THINGS: NOT AT ALL
CLINICAL INTERPRETATION OF PHQ9 SCORE: NO FURTHER SCREENING NEEDED
CLINICAL INTERPRETATION OF PHQ2 SCORE: NO FURTHER SCREENING NEEDED
SUM OF ALL RESPONSES TO PHQ9 QUESTIONS 1 AND 2: 0

## 2021-04-08 DIAGNOSIS — E03.9 HYPOTHYROIDISM, UNSPECIFIED TYPE: ICD-10-CM

## 2021-04-08 RX ORDER — LEVOTHYROXINE SODIUM 0.2 MG/1
TABLET ORAL
Qty: 90 TABLET | Refills: 0 | Status: SHIPPED | OUTPATIENT
Start: 2021-04-08 | End: 2021-07-06

## 2021-04-14 ENCOUNTER — LAB ENCOUNTER (OUTPATIENT)
Dept: LAB | Age: 81
End: 2021-04-14
Attending: FAMILY MEDICINE
Payer: MEDICARE

## 2021-04-14 DIAGNOSIS — E78.2 MIXED HYPERLIPIDEMIA: ICD-10-CM

## 2021-04-14 DIAGNOSIS — Z95.5 STENTED CORONARY ARTERY: ICD-10-CM

## 2021-04-14 DIAGNOSIS — E03.9 HYPOTHYROIDISM, UNSPECIFIED TYPE: ICD-10-CM

## 2021-04-14 DIAGNOSIS — I65.23 BILATERAL CAROTID ARTERY STENOSIS: Primary | ICD-10-CM

## 2021-04-14 DIAGNOSIS — I35.0 AORTIC STENOSIS, MODERATE: ICD-10-CM

## 2021-04-14 DIAGNOSIS — F32.A ANXIETY AND DEPRESSION: ICD-10-CM

## 2021-04-14 DIAGNOSIS — E11.9 TYPE 2 DIABETES MELLITUS WITHOUT COMPLICATION, WITHOUT LONG-TERM CURRENT USE OF INSULIN (HCC): ICD-10-CM

## 2021-04-14 DIAGNOSIS — I10 ESSENTIAL HYPERTENSION: ICD-10-CM

## 2021-04-14 DIAGNOSIS — F41.9 ANXIETY AND DEPRESSION: ICD-10-CM

## 2021-04-14 DIAGNOSIS — E78.00 PURE HYPERCHOLESTEROLEMIA: ICD-10-CM

## 2021-04-14 DIAGNOSIS — Z86.718 HISTORY OF DVT OF LOWER EXTREMITY: ICD-10-CM

## 2021-04-14 DIAGNOSIS — I35.0 NODULAR CALCIFIC AORTIC VALVE STENOSIS: ICD-10-CM

## 2021-04-14 PROCEDURE — 84439 ASSAY OF FREE THYROXINE: CPT

## 2021-04-14 PROCEDURE — 83036 HEMOGLOBIN GLYCOSYLATED A1C: CPT

## 2021-04-14 PROCEDURE — 36415 COLL VENOUS BLD VENIPUNCTURE: CPT

## 2021-04-14 PROCEDURE — 80061 LIPID PANEL: CPT

## 2021-04-14 PROCEDURE — 85025 COMPLETE CBC W/AUTO DIFF WBC: CPT

## 2021-04-14 PROCEDURE — 84443 ASSAY THYROID STIM HORMONE: CPT

## 2021-04-14 PROCEDURE — 80053 COMPREHEN METABOLIC PANEL: CPT

## 2021-04-16 ENCOUNTER — OFFICE VISIT (OUTPATIENT)
Dept: FAMILY MEDICINE CLINIC | Facility: CLINIC | Age: 81
End: 2021-04-16
Payer: MEDICARE

## 2021-04-16 VITALS
HEART RATE: 68 BPM | RESPIRATION RATE: 20 BRPM | BODY MASS INDEX: 42.69 KG/M2 | DIASTOLIC BLOOD PRESSURE: 64 MMHG | SYSTOLIC BLOOD PRESSURE: 130 MMHG | HEIGHT: 62 IN | WEIGHT: 232 LBS

## 2021-04-16 DIAGNOSIS — E03.9 HYPOTHYROIDISM, UNSPECIFIED TYPE: ICD-10-CM

## 2021-04-16 DIAGNOSIS — I35.0 AORTIC STENOSIS, MODERATE: ICD-10-CM

## 2021-04-16 DIAGNOSIS — I10 ESSENTIAL HYPERTENSION: ICD-10-CM

## 2021-04-16 DIAGNOSIS — I25.10 CORONARY ARTERY DISEASE INVOLVING NATIVE CORONARY ARTERY OF NATIVE HEART WITHOUT ANGINA PECTORIS: ICD-10-CM

## 2021-04-16 DIAGNOSIS — F32.A ANXIETY AND DEPRESSION: ICD-10-CM

## 2021-04-16 DIAGNOSIS — Z86.718 HISTORY OF DVT OF LOWER EXTREMITY: ICD-10-CM

## 2021-04-16 DIAGNOSIS — E11.9 TYPE 2 DIABETES MELLITUS WITHOUT COMPLICATION, WITHOUT LONG-TERM CURRENT USE OF INSULIN (HCC): Primary | ICD-10-CM

## 2021-04-16 DIAGNOSIS — E78.2 MIXED HYPERLIPIDEMIA: ICD-10-CM

## 2021-04-16 DIAGNOSIS — F41.9 ANXIETY AND DEPRESSION: ICD-10-CM

## 2021-04-16 PROCEDURE — 99214 OFFICE O/P EST MOD 30 MIN: CPT | Performed by: FAMILY MEDICINE

## 2021-04-16 NOTE — PROGRESS NOTES
Cathi Delatorre is a [de-identified]year old female. HPI:   Patient presents for recheck of her hypertension. Pt has been following her diet as instructed, she does not take medications presently, home BP monitoring in the range of 203'K systolic and 99'D diastolic. tablet 1   • aspirin 81 MG Oral Tab EC Take 81 mg by mouth daily. • LORAZEPAM 0.5 MG Oral Tab TAKE 1 TABLET BY MOUTH EVERY DAY AS NEEDED 30 tablet 0   • Metoprolol Succinate ER 25 MG Oral Tablet 24 Hr Take 25 mg by mouth once daily.      • Clopidogrel B monofilament/sensation of both feet is normal.    ASSESSMENT AND PLAN:   1. Type 2 diabetes mellitus without complication, without long-term current use of insulin (HCC)  Continue to follow a low-fat diet  - COMP METABOLIC PANEL (14);  Future  - LIPID PANEL

## 2021-04-21 ENCOUNTER — MED REC SCAN ONLY (OUTPATIENT)
Dept: FAMILY MEDICINE CLINIC | Facility: CLINIC | Age: 81
End: 2021-04-21

## 2021-04-30 DIAGNOSIS — E03.9 HYPOTHYROIDISM, UNSPECIFIED TYPE: ICD-10-CM

## 2021-04-30 RX ORDER — LEVOTHYROXINE SODIUM 0.07 MG/1
75 TABLET ORAL
Qty: 90 TABLET | Refills: 0 | Status: SHIPPED | OUTPATIENT
Start: 2021-04-30 | End: 2021-12-20

## 2021-05-25 ENCOUNTER — OFFICE VISIT (OUTPATIENT)
Dept: FAMILY MEDICINE CLINIC | Facility: CLINIC | Age: 81
End: 2021-05-25
Payer: MEDICARE

## 2021-05-25 VITALS
SYSTOLIC BLOOD PRESSURE: 138 MMHG | HEART RATE: 72 BPM | DIASTOLIC BLOOD PRESSURE: 74 MMHG | BODY MASS INDEX: 43 KG/M2 | WEIGHT: 234 LBS | RESPIRATION RATE: 18 BRPM

## 2021-05-25 DIAGNOSIS — R55 NEAR SYNCOPE: Primary | ICD-10-CM

## 2021-05-25 PROCEDURE — 99214 OFFICE O/P EST MOD 30 MIN: CPT | Performed by: FAMILY MEDICINE

## 2021-05-25 PROCEDURE — 93000 ELECTROCARDIOGRAM COMPLETE: CPT | Performed by: FAMILY MEDICINE

## 2021-05-26 ENCOUNTER — MED REC SCAN ONLY (OUTPATIENT)
Dept: FAMILY MEDICINE CLINIC | Facility: CLINIC | Age: 81
End: 2021-05-26

## 2021-06-01 NOTE — PROGRESS NOTES
HPI/Subjective:   Jaxson Muro is a [de-identified]year old female who presents for Fall (fell 3 weeks ago states was just standing and fell, happened again denies feeling dizzy when this happened)     Patient is an 81-year-old female who stated that she was standi once daily. Review of Systems:  Pertinent items are noted in HPI.       Objective:   /74   Pulse 72   Resp 18   Wt 234 lb (106.1 kg)   BMI 42.80 kg/m²  Estimated body mass index is 42.8 kg/m² as calculated from the following:    Height as of 4

## 2021-06-08 ENCOUNTER — HOSPITAL ENCOUNTER (OUTPATIENT)
Dept: CV DIAGNOSTICS | Age: 81
Discharge: HOME | End: 2021-06-08
Attending: FAMILY MEDICINE
Payer: MEDICARE

## 2021-06-08 ENCOUNTER — LAB ENCOUNTER (OUTPATIENT)
Dept: LAB | Age: 81
End: 2021-06-08
Attending: FAMILY MEDICINE
Payer: MEDICARE

## 2021-06-08 DIAGNOSIS — R55 NEAR SYNCOPE: ICD-10-CM

## 2021-06-08 PROCEDURE — 93227 XTRNL ECG REC<48 HR R&I: CPT | Performed by: FAMILY MEDICINE

## 2021-06-08 PROCEDURE — 93225 XTRNL ECG REC<48 HRS REC: CPT | Performed by: FAMILY MEDICINE

## 2021-06-08 PROCEDURE — 93226 XTRNL ECG REC<48 HR SCAN A/R: CPT | Performed by: FAMILY MEDICINE

## 2021-06-10 DIAGNOSIS — F32.A ANXIETY AND DEPRESSION: ICD-10-CM

## 2021-06-10 DIAGNOSIS — F41.9 ANXIETY AND DEPRESSION: ICD-10-CM

## 2021-06-10 RX ORDER — LORAZEPAM 0.5 MG/1
TABLET ORAL
Qty: 30 TABLET | Refills: 0 | Status: SHIPPED | OUTPATIENT
Start: 2021-06-10 | End: 2021-09-23

## 2021-06-10 NOTE — TELEPHONE ENCOUNTER
LOV: 5/25/21  Future Visit: none   Last Rx: 12/8/20 30 tabs 0 refills  Last Labs: 4/14/21   Per protocol to provider

## 2021-06-20 DIAGNOSIS — R06.02 SHORTNESS OF BREATH: ICD-10-CM

## 2021-06-20 DIAGNOSIS — I10 ESSENTIAL (PRIMARY) HYPERTENSION: ICD-10-CM

## 2021-06-20 DIAGNOSIS — E87.79 OTHER HYPERVOLEMIA: ICD-10-CM

## 2021-06-21 RX ORDER — POTASSIUM CHLORIDE 750 MG/1
TABLET, FILM COATED, EXTENDED RELEASE ORAL
Qty: 90 TABLET | Refills: 0 | Status: SHIPPED | OUTPATIENT
Start: 2021-06-21 | End: 2021-09-20

## 2021-06-21 RX ORDER — PANTOPRAZOLE SODIUM 40 MG/1
TABLET, DELAYED RELEASE ORAL
Qty: 90 TABLET | Refills: 0 | Status: SHIPPED | OUTPATIENT
Start: 2021-06-21 | End: 2021-09-20

## 2021-06-21 RX ORDER — LISINOPRIL 20 MG/1
TABLET ORAL
Qty: 90 TABLET | Refills: 0 | Status: SHIPPED | OUTPATIENT
Start: 2021-06-21 | End: 2021-09-20

## 2021-07-05 DIAGNOSIS — E03.9 HYPOTHYROIDISM, UNSPECIFIED TYPE: ICD-10-CM

## 2021-07-06 RX ORDER — LEVOTHYROXINE SODIUM 0.2 MG/1
TABLET ORAL
Qty: 90 TABLET | Refills: 0 | Status: SHIPPED | OUTPATIENT
Start: 2021-07-06 | End: 2021-09-28

## 2021-07-19 DIAGNOSIS — E78.2 MIXED HYPERLIPIDEMIA: ICD-10-CM

## 2021-07-19 RX ORDER — ATORVASTATIN CALCIUM 10 MG/1
TABLET, FILM COATED ORAL
Qty: 90 TABLET | Refills: 1 | Status: SHIPPED | OUTPATIENT
Start: 2021-07-19 | End: 2022-01-12

## 2021-07-29 DIAGNOSIS — E87.79 OTHER FLUID OVERLOAD: ICD-10-CM

## 2021-07-29 DIAGNOSIS — R06.02 SHORTNESS OF BREATH: ICD-10-CM

## 2021-07-29 RX ORDER — APIXABAN 5 MG/1
TABLET, FILM COATED ORAL
Qty: 90 TABLET | Refills: 1 | Status: SHIPPED | OUTPATIENT
Start: 2021-07-29 | End: 2022-01-26

## 2021-07-29 RX ORDER — FUROSEMIDE 20 MG/1
TABLET ORAL
Qty: 90 TABLET | Refills: 1 | Status: SHIPPED | OUTPATIENT
Start: 2021-07-29 | End: 2022-01-26

## 2021-07-29 NOTE — TELEPHONE ENCOUNTER
LV 5/25/21  No return date noted on chart  Last filled on 2/8/21  90 tablets/0 refills  Please fill if appropriate.   Thank You

## 2021-08-05 DIAGNOSIS — F32.A ANXIETY AND DEPRESSION: ICD-10-CM

## 2021-08-05 DIAGNOSIS — F41.9 ANXIETY AND DEPRESSION: ICD-10-CM

## 2021-08-06 RX ORDER — SERTRALINE HYDROCHLORIDE 100 MG/1
TABLET, FILM COATED ORAL
Qty: 90 TABLET | Refills: 0 | Status: CANCELLED | OUTPATIENT
Start: 2021-08-06

## 2021-08-17 ENCOUNTER — TELEPHONE (OUTPATIENT)
Dept: FAMILY MEDICINE CLINIC | Facility: CLINIC | Age: 81
End: 2021-08-17

## 2021-09-07 DIAGNOSIS — F32.A ANXIETY AND DEPRESSION: ICD-10-CM

## 2021-09-07 DIAGNOSIS — F41.9 ANXIETY AND DEPRESSION: ICD-10-CM

## 2021-09-07 RX ORDER — SERTRALINE HYDROCHLORIDE 100 MG/1
TABLET, FILM COATED ORAL
Qty: 90 TABLET | Refills: 0 | Status: SHIPPED | OUTPATIENT
Start: 2021-09-07 | End: 2021-12-13

## 2021-09-09 ENCOUNTER — OFFICE VISIT (OUTPATIENT)
Dept: FAMILY MEDICINE CLINIC | Facility: CLINIC | Age: 81
End: 2021-09-09
Payer: MEDICARE

## 2021-09-09 VITALS
TEMPERATURE: 98 F | BODY MASS INDEX: 43.24 KG/M2 | WEIGHT: 232 LBS | DIASTOLIC BLOOD PRESSURE: 68 MMHG | HEIGHT: 61.5 IN | SYSTOLIC BLOOD PRESSURE: 132 MMHG | RESPIRATION RATE: 16 BRPM | HEART RATE: 72 BPM

## 2021-09-09 DIAGNOSIS — I10 ESSENTIAL HYPERTENSION: ICD-10-CM

## 2021-09-09 DIAGNOSIS — I35.0 AORTIC STENOSIS, MODERATE: ICD-10-CM

## 2021-09-09 DIAGNOSIS — E78.2 MIXED HYPERLIPIDEMIA: ICD-10-CM

## 2021-09-09 DIAGNOSIS — Z86.718 HISTORY OF DVT OF LOWER EXTREMITY: ICD-10-CM

## 2021-09-09 DIAGNOSIS — Z01.818 PREOPERATIVE CLEARANCE: Primary | ICD-10-CM

## 2021-09-09 DIAGNOSIS — F32.A ANXIETY AND DEPRESSION: ICD-10-CM

## 2021-09-09 DIAGNOSIS — E03.9 HYPOTHYROIDISM, UNSPECIFIED TYPE: ICD-10-CM

## 2021-09-09 DIAGNOSIS — K21.9 GASTROESOPHAGEAL REFLUX DISEASE WITHOUT ESOPHAGITIS: ICD-10-CM

## 2021-09-09 DIAGNOSIS — H25.9 AGE-RELATED CATARACT OF BOTH EYES, UNSPECIFIED AGE-RELATED CATARACT TYPE: ICD-10-CM

## 2021-09-09 DIAGNOSIS — F41.9 ANXIETY AND DEPRESSION: ICD-10-CM

## 2021-09-09 DIAGNOSIS — E11.9 TYPE 2 DIABETES MELLITUS WITHOUT COMPLICATION, WITHOUT LONG-TERM CURRENT USE OF INSULIN (HCC): ICD-10-CM

## 2021-09-09 DIAGNOSIS — I25.10 CORONARY ARTERY DISEASE INVOLVING NATIVE CORONARY ARTERY OF NATIVE HEART WITHOUT ANGINA PECTORIS: ICD-10-CM

## 2021-09-09 PROCEDURE — 99214 OFFICE O/P EST MOD 30 MIN: CPT | Performed by: FAMILY MEDICINE

## 2021-09-09 NOTE — PROGRESS NOTES
Sonam Coker is a [de-identified]year old female who presents for a pre-operative physical exam. Patient is to have bilateral cataract extraction with IOL placement, to be done by Dr. Louise Dill at [facility] with the left eye being done on 9/15/2021 and • High cholesterol    • History of PTCA 2011   • Hypothyroid    • Obesity, unspecified    • Osteoarthritis    • Prediabetes    • Unspecified essential hypertension    • Visual impairment     glasses      Past Surgical History:   Procedure Laterality Date distress  SKIN: no rashes,no suspicious lesions  HEENT: atraumatic, normocephalic,ears and throat are clear  EYES: See ophthalmology note   NECK: supple,no adenopathy,no bruits  LUNGS: clear to auscultation  CARDIO: RRR with grade 2/6 systolic murmur  GI: depression  Continue sertraline 100 mg 1 tablet daily    10. Hypothyroidism, unspecified type  Continue levothyroxine 200 mcg 1 tablet daily along with a 75 mcg tablet    11.  GERD  Continue pantoprazole 40 mg 1 tablet daily

## 2021-09-12 ENCOUNTER — LAB ENCOUNTER (OUTPATIENT)
Dept: LAB | Facility: HOSPITAL | Age: 81
End: 2021-09-12
Attending: OPHTHALMOLOGY
Payer: MEDICARE

## 2021-09-12 DIAGNOSIS — Z01.818 PRE-PROCEDURAL EXAMINATION: ICD-10-CM

## 2021-09-14 LAB — SARS-COV-2 RNA RESP QL NAA+PROBE: NOT DETECTED

## 2021-09-19 DIAGNOSIS — E87.79 OTHER HYPERVOLEMIA: ICD-10-CM

## 2021-09-19 DIAGNOSIS — I10 ESSENTIAL (PRIMARY) HYPERTENSION: ICD-10-CM

## 2021-09-19 DIAGNOSIS — R06.02 SHORTNESS OF BREATH: ICD-10-CM

## 2021-09-20 RX ORDER — LISINOPRIL 20 MG/1
TABLET ORAL
Qty: 90 TABLET | Refills: 0 | Status: SHIPPED | OUTPATIENT
Start: 2021-09-20 | End: 2021-12-14

## 2021-09-20 RX ORDER — POTASSIUM CHLORIDE 750 MG/1
TABLET, FILM COATED, EXTENDED RELEASE ORAL
Qty: 90 TABLET | Refills: 0 | Status: SHIPPED | OUTPATIENT
Start: 2021-09-20 | End: 2021-12-14

## 2021-09-20 RX ORDER — PANTOPRAZOLE SODIUM 40 MG/1
TABLET, DELAYED RELEASE ORAL
Qty: 90 TABLET | Refills: 0 | Status: SHIPPED | OUTPATIENT
Start: 2021-09-20 | End: 2021-12-14

## 2021-09-23 DIAGNOSIS — F32.A ANXIETY AND DEPRESSION: ICD-10-CM

## 2021-09-23 DIAGNOSIS — F41.9 ANXIETY AND DEPRESSION: ICD-10-CM

## 2021-09-23 RX ORDER — LORAZEPAM 0.5 MG/1
TABLET ORAL
Qty: 30 TABLET | Refills: 0 | Status: SHIPPED | OUTPATIENT
Start: 2021-09-23

## 2021-09-26 ENCOUNTER — LAB ENCOUNTER (OUTPATIENT)
Dept: LAB | Facility: HOSPITAL | Age: 81
End: 2021-09-26
Attending: OPHTHALMOLOGY
Payer: MEDICARE

## 2021-09-26 DIAGNOSIS — Z01.818 PRE-PROCEDURAL EXAMINATION: ICD-10-CM

## 2021-09-27 LAB — SARS-COV-2 RNA RESP QL NAA+PROBE: NOT DETECTED

## 2021-09-28 DIAGNOSIS — E03.9 HYPOTHYROIDISM, UNSPECIFIED TYPE: ICD-10-CM

## 2021-09-28 RX ORDER — LEVOTHYROXINE SODIUM 0.2 MG/1
TABLET ORAL
Qty: 90 TABLET | Refills: 0 | Status: SHIPPED | OUTPATIENT
Start: 2021-09-28 | End: 2021-12-30

## 2021-10-05 ENCOUNTER — TELEPHONE (OUTPATIENT)
Dept: FAMILY MEDICINE CLINIC | Facility: CLINIC | Age: 81
End: 2021-10-05

## 2021-10-05 NOTE — TELEPHONE ENCOUNTER
Call to pt-sts dr Dennis Sellers had previously discussed her possibly going off eliquis but advised her to check w dr Kristen York for his recommendations. Pt sts called dr Dwight Briones ofc w this information and was advised since dr Dennis Sellers is the prescribing dr for the eliquis-decision to stop this med should come from dr Dennis Sellers. Advised pt will update dr Dennis Sellers w this info and will call back w further instructions. Pt aware dr Dennis Sellers out of ofc tomorrow/returns 10/7. Reinforced continuing eliquis as ordered until further recommendations from dr Dennis Sellers. Patient voices understanding/agrees with plan/no further questions. Dr Dennis Sellers updated w above info from pt. He sts pt was initially started on eliquis in hospital   Requests call to dr Diwght Briones ofc tomorrow w this info-confirm that dr Kristen oYrk has no cardiac concerns or findings that warrant pt staying on eliquis. Will follow up re above tomorrow.

## 2021-10-05 NOTE — TELEPHONE ENCOUNTER
Pt called and asked to speak with [facility] about Raniquis. 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

## 2021-10-06 NOTE — TELEPHONE ENCOUNTER
[facility] defers the decision to stop Eliquis back to . Per Olu Abbasi [facility] that although Eliquis was initially prescribed in ED, since  was the initial prescriber to continue Eliquis he must make the decision to stop Eliquis. No additional recommendations provided from [facility].

## 2021-10-06 NOTE — TELEPHONE ENCOUNTER
1030 call to patricia WARREN liaison/dr sin's ofc-advised of info noted below from pt and request from dr Lucretia Eller will forward  messg to dr Sanjana Gonzalez and call back w his input. Provided our ofc  line #-to speak w me.

## 2021-10-07 NOTE — TELEPHONE ENCOUNTER
Elvira Horan, call center   She will page on call provider Emily SPENCE/ Dr. Lida Mazariegos is not available

## 2021-10-07 NOTE — TELEPHONE ENCOUNTER
Call to [facility] previous page  Request call back ebonie     D/w Dr. Krishan Santos, he wants to personally talked to Dr. Romy Durant  Can call ebonie, when he return call---  Please connect to Dr. Krishan Santos personal cell (see Triage binder for directory)       Left msg to pt ID VM--updating still waiting for cardio's input

## 2021-10-08 ENCOUNTER — APPOINTMENT (OUTPATIENT)
Dept: CARDIOLOGY | Age: 81
End: 2021-10-08

## 2021-10-14 NOTE — TELEPHONE ENCOUNTER
Dr Zuniga Alert confirms has not received call back from dr Brenda Ariza wants to speak directly w him re pt. Call to patricia WARREN liaison/[facility]-updated w above info. Requested messg to dr Cam Jara yet today advising dr Hair Hernandez requesting call back today. Huan Fraire voices understanding, sts will send messg to dr Adrianna Pineda now, provided our ofc dr line #.    Update to dr Hair Hernandez

## 2021-11-04 ENCOUNTER — APPOINTMENT (OUTPATIENT)
Dept: GENERAL RADIOLOGY | Age: 81
End: 2021-11-04
Attending: PHYSICIAN ASSISTANT
Payer: MEDICARE

## 2021-11-04 ENCOUNTER — HOSPITAL ENCOUNTER (EMERGENCY)
Age: 81
Discharge: HOME | End: 2021-11-04
Attending: EMERGENCY MEDICINE
Payer: MEDICARE

## 2021-11-04 ENCOUNTER — APPOINTMENT (OUTPATIENT)
Dept: GENERAL RADIOLOGY | Age: 81
End: 2021-11-04
Attending: EMERGENCY MEDICINE
Payer: MEDICARE

## 2021-11-04 ENCOUNTER — APPOINTMENT (OUTPATIENT)
Dept: CT IMAGING | Age: 81
End: 2021-11-04
Attending: PHYSICIAN ASSISTANT
Payer: MEDICARE

## 2021-11-04 VITALS
RESPIRATION RATE: 16 BRPM | SYSTOLIC BLOOD PRESSURE: 160 MMHG | OXYGEN SATURATION: 98 % | BODY MASS INDEX: 42 KG/M2 | DIASTOLIC BLOOD PRESSURE: 47 MMHG | TEMPERATURE: 98 F | HEART RATE: 87 BPM | WEIGHT: 225 LBS
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
## 2021-11-04 DIAGNOSIS — S01.112A LACERATION OF LEFT EYEBROW, INITIAL ENCOUNTER: ICD-10-CM

## 2021-11-04 DIAGNOSIS — S52.135A CLOSED NONDISPLACED FRACTURE OF NECK OF LEFT RADIUS, INITIAL ENCOUNTER: Primary | ICD-10-CM

## 2021-11-04 PROCEDURE — 73080 X-RAY EXAM OF ELBOW: CPT | Performed by: EMERGENCY MEDICINE

## 2021-11-04 PROCEDURE — 99285 EMERGENCY DEPT VISIT HI MDM: CPT | Performed by: EMERGENCY MEDICINE

## 2021-11-04 PROCEDURE — 29105 APPLICATION LONG ARM SPLINT: CPT | Performed by: EMERGENCY MEDICINE

## 2021-11-04 PROCEDURE — 72125 CT NECK SPINE W/O DYE: CPT | Performed by: PHYSICIAN ASSISTANT

## 2021-11-04 PROCEDURE — 99284 EMERGENCY DEPT VISIT MOD MDM: CPT | Performed by: EMERGENCY MEDICINE

## 2021-11-04 PROCEDURE — 12011 RPR F/E/E/N/L/M 2.5 CM/<: CPT | Performed by: EMERGENCY MEDICINE

## 2021-11-04 PROCEDURE — 73090 X-RAY EXAM OF FOREARM: CPT | Performed by: EMERGENCY MEDICINE

## 2021-11-04 PROCEDURE — 70450 CT HEAD/BRAIN W/O DYE: CPT | Performed by: PHYSICIAN ASSISTANT

## 2021-11-04 PROCEDURE — 73060 X-RAY EXAM OF HUMERUS: CPT | Performed by: PHYSICIAN ASSISTANT

## 2021-11-04 RX ORDER — TETANUS AND DIPHTHERIA TOXOIDS ADSORBED 2; 2 [LF]/.5ML; [LF]/.5ML
0.5 INJECTION INTRAMUSCULAR ONCE
Status: DISCONTINUED | OUTPATIENT
Start: 2021-11-04 | End: 2021-11-04

## 2021-11-04 NOTE — ED PROVIDER NOTES
Patient Seen in: THE [facility] Emergency Department In [place]      History   Patient presents with:  Laceration/Abrasion  Head Neck Injury    Stated Complaint: head injury/lac s/p fall    Subjective:   HPI    7year-old female. Moderate medical history.   C Smokeless tobacco: Never Used    Vaping Use      Vaping Use: Never used    Alcohol use: Yes      Comment: occ    Drug use: No             Review of Systems    Positive for stated complaint: head injury/lac s/p fall  Other systems are as noted in HPI.   Cons outside her home due to lost of balance. Patient states left arm sore. FINDINGS:  BONES:  There is evidence of a nondisplaced fracture extending through the neck /metaphysis of the proximal radius. No discrete evidence of intra-articular extension.   No [address]          Mihir Mcfarland MD                Medications Prescribed:  Current Discharge Medication List

## 2021-11-04 NOTE — ED NOTES
I reviewed that chart and discussed the case.   I have examined the patient and noted patient is an 81-year-old female who is on Eliquis presents emergency room with a history of tripping and falling at home hitting the left upper arm and also the left side to the left upper left arm only with no other focal tenderness palpation throughout the upper or lower extremities appreciated. NEURO: Patient is awake, alert and oriented to time place and person. Motor strength is 5 over 5 in all 4 extremities.  There ar Wilder Gonzalez DO on 11/04/2021 at 4:38 PM     Finalized by (CST): Wilder Gonzalez DO on 11/04/2021 at 4:41 PM       CT SPINE CERVICAL (CPT=72125)    Result Date: 11/4/2021  CONCLUSION:  1. No acute process.  2. Multilevel disc disease and facet arthropathy, s

## 2021-11-05 ENCOUNTER — TELEPHONE (OUTPATIENT)
Dept: FAMILY MEDICINE CLINIC | Facility: CLINIC | Age: 81
End: 2021-11-05

## 2021-11-05 NOTE — TELEPHONE ENCOUNTER
Lm on vm to cb. Per ER MD:   Patient will need wound check in 2 days and suture removal in 5 to 7 days. Sutures were put in 11/4/20211.

## 2021-11-05 NOTE — TELEPHONE ENCOUNTER
Pt went to ER @ [address]  yesterday. Fell on concrete, hit head, three stitches corner L eye, fx L arm. States doesn't know when stitches should be removed and needs referral for ortho physician. Pls advise.   Thank you

## 2021-11-06 ENCOUNTER — OFFICE VISIT (OUTPATIENT)
Dept: FAMILY MEDICINE CLINIC | Facility: CLINIC | Age: 81
End: 2021-11-06
Payer: MEDICARE

## 2021-11-06 VITALS — BODY MASS INDEX: 41.94 KG/M2 | WEIGHT: 225 LBS | HEIGHT: 61.5 IN
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
## 2021-11-06 DIAGNOSIS — S00.83XD FACIAL BRUISING, SUBSEQUENT ENCOUNTER: ICD-10-CM

## 2021-11-06 DIAGNOSIS — S01.112D LACERATION OF LEFT EYEBROW, SUBSEQUENT ENCOUNTER: ICD-10-CM

## 2021-11-06 DIAGNOSIS — S52.135D CLOSED NONDISPLACED FRACTURE OF NECK OF LEFT RADIUS WITH ROUTINE HEALING, SUBSEQUENT ENCOUNTER: Primary | ICD-10-CM

## 2021-11-06 PROCEDURE — 99214 OFFICE O/P EST MOD 30 MIN: CPT | Performed by: STUDENT IN AN ORGANIZED HEALTH CARE EDUCATION/TRAINING PROGRAM

## 2021-11-06 NOTE — PATIENT INSTRUCTIONS
How Bones Heal  Bone is living tissue made up of cells. When a bone breaks, cells in the blood rush to the fractured area. These cells help grow new bone. Bones heal through a gradual process called remodeling.  The length of this process depends on gener

## 2021-11-06 NOTE — PROGRESS NOTES
[facility] Family Medicine Note    Chief complaint: Patient presents with:   Follow - Up: wound    HPI:   Patient is a [de-identified]year old female with a PMH of  has a past medical history of Acute bronchitis, Anxiety state, Asthma, Cough, Disorder of thyr 2011   • Hypothyroid    • Obesity, unspecified    • Osteoarthritis    • Prediabetes    • Unspecified essential hypertension    • Visual impairment     glasses     Past Surgical History:   Procedure Laterality Date   • ANGIOGRAM     • ANGIOPLASTY (CORONARY) +allergy to codeine    EXAM:   BP (P) 152/50 (BP Location: Right arm, Patient Position: Sitting, Cuff Size: large)   Pulse (P) 55   Temp (P) 97.8 °F (36.6 °C) (Oral)   Resp (P) 20   Ht 5' 1.5\" (1.562 m)   Wt 225 lb (102.1 kg)   SpO2 (P) 96%   BMI 41.82 kg subsequent encounter  Mechanical fall in garage, landed on Left side        Meds & Refills for this Visit:  Requested Prescriptions      No prescriptions requested or ordered in this encounter       Health Maintenance:  Zoster Vaccines(1 of 2) Never done

## 2021-11-08 ENCOUNTER — TELEPHONE (OUTPATIENT)
Dept: FAMILY MEDICINE CLINIC | Facility: CLINIC | Age: 81
End: 2021-11-08

## 2021-11-08 ENCOUNTER — TELEPHONE (OUTPATIENT)
Dept: ORTHOPEDICS CLINIC | Facility: CLINIC | Age: 81
End: 2021-11-08

## 2021-11-08 NOTE — TELEPHONE ENCOUNTER
Spoke with patient and she was notified that she can be seen Wednesday, 11/10/21, with Dr. Elaina Bradley. Appt set and time/date/location confirmed with patient. Xrays in epic. No further questions at this time.

## 2021-11-08 NOTE — TELEPHONE ENCOUNTER
Pt had OV w/  on 11/06/21 and was advised to RTC on 11/10/21 for suture removal.     Call to ofc of Amy Dallas  S/w [address] sts she sees the next available is in ~2 weeks. Torito White did not wish to provide exact date.  She sts she nee

## 2021-11-08 NOTE — TELEPHONE ENCOUNTER
Patient is requesting to be seen by Dr. Chao Montero for a fractured left radius. Can patient be double booked?

## 2021-11-08 NOTE — TELEPHONE ENCOUNTER
I believe the patient has an appointment with me on 11/22/2021.   Have her continue on the Eliquis until that time and we will discuss taking her off

## 2021-11-08 NOTE — TELEPHONE ENCOUNTER
Pt calling to make follow up appt--states she was told to make appt this Weds w/ Dr. Denver Risser. No appts available; please advise. Pt also states that she was told to make a follow up appt w/Dr. Anali Harvey; Dr. Erin Maria earliest availability is in 2 weeks.  Pt i

## 2021-11-08 NOTE — TELEPHONE ENCOUNTER
Noted.    S/w pt. Advised to continue taking Eliquis until her appt w/ Dr. Denton oBwen on 11/22/21. At that time  will make determination. Pt sts she has enough pills remaining, does not need refill at this time.   Sts ofc of  called her

## 2021-11-10 ENCOUNTER — OFFICE VISIT (OUTPATIENT)
Dept: ORTHOPEDICS CLINIC | Facility: CLINIC | Age: 81
End: 2021-11-10
Payer: MEDICARE

## 2021-11-10 ENCOUNTER — HOSPITAL ENCOUNTER (OUTPATIENT)
Dept: GENERAL RADIOLOGY | Age: 81
Discharge: HOME | End: 2021-11-10
Attending: ORTHOPAEDIC SURGERY
Payer: MEDICARE

## 2021-11-10 ENCOUNTER — OFFICE VISIT (OUTPATIENT)
Dept: FAMILY MEDICINE CLINIC | Facility: CLINIC | Age: 81
End: 2021-11-10
Payer: MEDICARE

## 2021-11-10 VITALS — BODY MASS INDEX: 41.94 KG/M2 | HEIGHT: 61.5 IN | WEIGHT: 225 LBS

## 2021-11-10 VITALS — WEIGHT: 225 LBS | HEIGHT: 61.5 IN | BODY MASS INDEX: 41.94 KG/M2

## 2021-11-10 DIAGNOSIS — S42.402A CLOSED FRACTURE OF LEFT ELBOW, INITIAL ENCOUNTER: ICD-10-CM

## 2021-11-10 DIAGNOSIS — S52.135A CLOSED NONDISPLACED FRACTURE OF NECK OF LEFT RADIUS, INITIAL ENCOUNTER: Primary | ICD-10-CM

## 2021-11-10 DIAGNOSIS — S01.112D LACERATION OF LEFT EYEBROW, SUBSEQUENT ENCOUNTER: Primary | ICD-10-CM

## 2021-11-10 PROCEDURE — 24650 CLTX RDL HEAD/NCK FX WO MNPJ: CPT | Performed by: ORTHOPAEDIC SURGERY

## 2021-11-10 PROCEDURE — 73080 X-RAY EXAM OF ELBOW: CPT | Performed by: ORTHOPAEDIC SURGERY

## 2021-11-10 PROCEDURE — 99203 OFFICE O/P NEW LOW 30 MIN: CPT | Performed by: ORTHOPAEDIC SURGERY

## 2021-11-10 PROCEDURE — 99212 OFFICE O/P EST SF 10 MIN: CPT | Performed by: STUDENT IN AN ORGANIZED HEALTH CARE EDUCATION/TRAINING PROGRAM

## 2021-11-10 NOTE — PROGRESS NOTES
[facility] New Consult    CC: Patient presents with:  Arm or Hand Injury: Pt is here for left elbow fracture, fell in garage after vacation on 11/4    HPI: The patient is a [de-identified]year old female referred for orthopaedic consultation by Dr. Trey Weinberg 10 MEQ Oral Tab CR TAKE 1 TABLET BY MOUTH EVERY DAY 90 tablet 0   • LISINOPRIL 20 MG Oral Tab TAKE 1 TABLET BY MOUTH EVERY DAY 90 tablet 0   • PANTOPRAZOLE 40 MG Oral Tab EC TAKE 1 TABLET BY MOUTH EVERY DAY 90 tablet 0   • SERTRALINE 100 MG Oral Tab TAKE 1 extension and flexion and near symmetrical full supination and pronation actively. Hand wrist range of motion is within normal limits comparable to the right side. Neurovascular status is intact on sensory, motor and perfusion assessment distally.     Tabatha Kumar

## 2021-11-10 NOTE — PROCEDURES
Patient presented for suture removal. Verbal consent was obtained. The sutures were moistened with alcohol wipes. Three sutures were removed without issue. Bacitracin ointment was applied. Patient tolerated procedure well.  Discharge instructions were discu

## 2021-11-22 ENCOUNTER — TELEPHONE (OUTPATIENT)
Dept: FAMILY MEDICINE CLINIC | Facility: CLINIC | Age: 81
End: 2021-11-22

## 2021-11-22 ENCOUNTER — OFFICE VISIT (OUTPATIENT)
Dept: FAMILY MEDICINE CLINIC | Facility: CLINIC | Age: 81
End: 2021-11-22
Payer: MEDICARE

## 2021-11-22 VITALS
SYSTOLIC BLOOD PRESSURE: 126 MMHG | HEART RATE: 62 BPM | HEIGHT: 61.5 IN | RESPIRATION RATE: 16 BRPM | BODY MASS INDEX: 42.05 KG/M2 | WEIGHT: 225.63 LBS | TEMPERATURE: 98 F | DIASTOLIC BLOOD PRESSURE: 72 MMHG

## 2021-11-22 DIAGNOSIS — E03.9 HYPOTHYROIDISM, UNSPECIFIED TYPE: ICD-10-CM

## 2021-11-22 DIAGNOSIS — Z86.718 HISTORY OF DVT OF LOWER EXTREMITY: ICD-10-CM

## 2021-11-22 DIAGNOSIS — E78.2 MIXED HYPERLIPIDEMIA: ICD-10-CM

## 2021-11-22 DIAGNOSIS — I10 ESSENTIAL HYPERTENSION: ICD-10-CM

## 2021-11-22 DIAGNOSIS — I25.10 CORONARY ARTERY DISEASE INVOLVING NATIVE CORONARY ARTERY OF NATIVE HEART WITHOUT ANGINA PECTORIS: ICD-10-CM

## 2021-11-22 DIAGNOSIS — K21.9 GASTROESOPHAGEAL REFLUX DISEASE WITHOUT ESOPHAGITIS: ICD-10-CM

## 2021-11-22 DIAGNOSIS — R53.81 PHYSICAL DECONDITIONING: ICD-10-CM

## 2021-11-22 DIAGNOSIS — F41.9 ANXIETY AND DEPRESSION: ICD-10-CM

## 2021-11-22 DIAGNOSIS — E11.9 TYPE 2 DIABETES MELLITUS WITHOUT COMPLICATION, WITHOUT LONG-TERM CURRENT USE OF INSULIN (HCC): Primary | ICD-10-CM

## 2021-11-22 DIAGNOSIS — F32.A ANXIETY AND DEPRESSION: ICD-10-CM

## 2021-11-22 DIAGNOSIS — I35.0 AORTIC STENOSIS, MODERATE: ICD-10-CM

## 2021-11-22 PROCEDURE — 99215 OFFICE O/P EST HI 40 MIN: CPT | Performed by: FAMILY MEDICINE

## 2021-11-23 NOTE — TELEPHONE ENCOUNTER
's office called back and stated there is no cardiac reason for her to take antibiotics for  Her dental procedure.

## 2021-11-23 NOTE — TELEPHONE ENCOUNTER
Please pass the information on that from a cardiac perspective she does not need prophylactic antibiotics

## 2021-11-23 NOTE — TELEPHONE ENCOUNTER
Please check with Dr. Tom Stern office to see whether he would like the patient on antibiotic dental prophylaxis because of her aortic stenosis

## 2021-11-23 NOTE — PROGRESS NOTES
Kasie Hartman is a [de-identified]year old female. HPI:   Patient presents for recheck of her hypertension. Pt has been following her diet as instructed, she does not take medications presently, home BP monitoring in the range of 586'X systolic and 71'Z diastolic. 1 TABLET BY MOUTH EVERY DAY 90 tablet 0   • PANTOPRAZOLE 40 MG Oral Tab EC TAKE 1 TABLET BY MOUTH EVERY DAY 90 tablet 0   • SERTRALINE 100 MG Oral Tab TAKE 1 TABLET BY MOUTH EVERY DAY 90 tablet 0   • FUROSEMIDE 20 MG Oral Tab TAKE 1 TABLET BY MOUTH EVERY D distress  SKIN: no rashes,no suspicious lesions  HEENT: atraumatic, normocephalic,ears and throat are clear  NECK: supple,no adenopathy,no bruits  LUNGS: clear to auscultation  CARDIO: RRR, 2/6 systolic murmur  GI: good BS's,no masses, HSM or tenderness  E deconditioning  Physical therapy evaluation    Above labs were originally ordered on 4/21/2021    Time: 50 minutes including time for documentation and chart review    See in 6 months

## 2021-11-23 NOTE — TELEPHONE ENCOUNTER
Call to ratna//dr sin's ofc-requested to speak w dr Yordy Trejo clinical staff.    Brie Perera they are on calls-provided dr dietz's question noted below re mutual pt, requested call back to triage nurse today and provided our ofc dr Meir Gaxiola

## 2021-11-23 NOTE — TELEPHONE ENCOUNTER
Call back from pt-advised of dr Natalie Choi response noted below. Pt voices understanding but now sts needs letter stating this to provide to dentist.   Justin Massey will forward letter request to dr Alejandro Pagan, but he may want letter to come from dr Niesha Maldonado.

## 2021-11-24 ENCOUNTER — MED REC SCAN ONLY (OUTPATIENT)
Dept: FAMILY MEDICINE CLINIC | Facility: CLINIC | Age: 81
End: 2021-11-24

## 2021-11-24 NOTE — TELEPHONE ENCOUNTER
Call to pt-advised requested letter completed. Pt sts will  in our office 11/26 morning-aware of ofc holiday hours that day. Letter placed at .

## 2021-12-01 ENCOUNTER — OFFICE VISIT (OUTPATIENT)
Dept: ORTHOPEDICS CLINIC | Facility: CLINIC | Age: 81
End: 2021-12-01
Payer: MEDICARE

## 2021-12-01 ENCOUNTER — HOSPITAL ENCOUNTER (OUTPATIENT)
Dept: GENERAL RADIOLOGY | Age: 81
Discharge: HOME | End: 2021-12-01
Attending: ORTHOPAEDIC SURGERY
Payer: MEDICARE

## 2021-12-01 DIAGNOSIS — S52.135D CLOSED NONDISPLACED FRACTURE OF NECK OF LEFT RADIUS WITH ROUTINE HEALING, SUBSEQUENT ENCOUNTER: Primary | ICD-10-CM

## 2021-12-01 DIAGNOSIS — S52.135A CLOSED NONDISPLACED FRACTURE OF NECK OF LEFT RADIUS, INITIAL ENCOUNTER: ICD-10-CM

## 2021-12-01 PROCEDURE — 99024 POSTOP FOLLOW-UP VISIT: CPT | Performed by: ORTHOPAEDIC SURGERY

## 2021-12-01 PROCEDURE — 73080 X-RAY EXAM OF ELBOW: CPT | Performed by: ORTHOPAEDIC SURGERY

## 2021-12-01 NOTE — PROGRESS NOTES
[facility] Fracture follow-up Progress Note      Date of Injury: 11/4/2021      History: The patient is an 28-year-old female presenting for reassessment of her left elbow fracture.   He has weaned out of the sling and splint and reports intermi may still exist.

## 2021-12-08 ENCOUNTER — LAB ENCOUNTER (OUTPATIENT)
Dept: LAB | Age: 81
End: 2021-12-08
Attending: FAMILY MEDICINE
Payer: MEDICARE

## 2021-12-08 DIAGNOSIS — E78.2 MIXED HYPERLIPIDEMIA: ICD-10-CM

## 2021-12-08 DIAGNOSIS — I25.10 CORONARY ARTERY DISEASE INVOLVING NATIVE CORONARY ARTERY OF NATIVE HEART WITHOUT ANGINA PECTORIS: ICD-10-CM

## 2021-12-08 DIAGNOSIS — E03.9 HYPOTHYROIDISM, UNSPECIFIED TYPE: ICD-10-CM

## 2021-12-08 DIAGNOSIS — F32.A ANXIETY AND DEPRESSION: ICD-10-CM

## 2021-12-08 DIAGNOSIS — F41.9 ANXIETY AND DEPRESSION: ICD-10-CM

## 2021-12-08 DIAGNOSIS — E11.9 TYPE 2 DIABETES MELLITUS WITHOUT COMPLICATION, WITHOUT LONG-TERM CURRENT USE OF INSULIN (HCC): ICD-10-CM

## 2021-12-08 DIAGNOSIS — Z86.718 HISTORY OF DVT OF LOWER EXTREMITY: ICD-10-CM

## 2021-12-08 DIAGNOSIS — I10 ESSENTIAL HYPERTENSION: ICD-10-CM

## 2021-12-08 PROCEDURE — 83036 HEMOGLOBIN GLYCOSYLATED A1C: CPT

## 2021-12-08 PROCEDURE — 84439 ASSAY OF FREE THYROXINE: CPT

## 2021-12-08 PROCEDURE — 85025 COMPLETE CBC W/AUTO DIFF WBC: CPT

## 2021-12-08 PROCEDURE — 80053 COMPREHEN METABOLIC PANEL: CPT

## 2021-12-08 PROCEDURE — 80061 LIPID PANEL: CPT

## 2021-12-08 PROCEDURE — 36415 COLL VENOUS BLD VENIPUNCTURE: CPT

## 2021-12-08 PROCEDURE — 82570 ASSAY OF URINE CREATININE: CPT

## 2021-12-08 PROCEDURE — 82043 UR ALBUMIN QUANTITATIVE: CPT

## 2021-12-08 PROCEDURE — 84443 ASSAY THYROID STIM HORMONE: CPT

## 2021-12-10 ENCOUNTER — LAB ENCOUNTER (OUTPATIENT)
Dept: LAB | Age: 81
End: 2021-12-10
Attending: FAMILY MEDICINE
Payer: MEDICARE

## 2021-12-10 ENCOUNTER — TELEPHONE (OUTPATIENT)
Dept: FAMILY MEDICINE CLINIC | Facility: CLINIC | Age: 81
End: 2021-12-10

## 2021-12-10 ENCOUNTER — HOSPITAL ENCOUNTER (EMERGENCY)
Facility: HOSPITAL | Age: 81
Discharge: HOME | End: 2021-12-11
Attending: EMERGENCY MEDICINE
Payer: MEDICARE

## 2021-12-10 DIAGNOSIS — E83.42 HYPOMAGNESEMIA: Primary | ICD-10-CM

## 2021-12-10 DIAGNOSIS — E83.51 HYPOCALCEMIA: ICD-10-CM

## 2021-12-10 DIAGNOSIS — D64.9 ANEMIA, UNSPECIFIED TYPE: ICD-10-CM

## 2021-12-10 PROCEDURE — 80053 COMPREHEN METABOLIC PANEL: CPT | Performed by: EMERGENCY MEDICINE

## 2021-12-10 PROCEDURE — 82330 ASSAY OF CALCIUM: CPT

## 2021-12-10 PROCEDURE — 99284 EMERGENCY DEPT VISIT MOD MDM: CPT

## 2021-12-10 PROCEDURE — 96365 THER/PROPH/DIAG IV INF INIT: CPT

## 2021-12-10 PROCEDURE — 83735 ASSAY OF MAGNESIUM: CPT | Performed by: EMERGENCY MEDICINE

## 2021-12-10 PROCEDURE — 83735 ASSAY OF MAGNESIUM: CPT

## 2021-12-10 PROCEDURE — 85025 COMPLETE CBC W/AUTO DIFF WBC: CPT | Performed by: EMERGENCY MEDICINE

## 2021-12-10 PROCEDURE — 36415 COLL VENOUS BLD VENIPUNCTURE: CPT

## 2021-12-10 PROCEDURE — 84100 ASSAY OF PHOSPHORUS: CPT

## 2021-12-10 RX ORDER — MAGNESIUM SULFATE HEPTAHYDRATE 40 MG/ML
2 INJECTION, SOLUTION INTRAVENOUS ONCE
Status: COMPLETED | OUTPATIENT
Start: 2021-12-10 | End: 2021-12-11

## 2021-12-11 VITALS
BODY MASS INDEX: 41.41 KG/M2 | WEIGHT: 225 LBS | TEMPERATURE: 98 F | SYSTOLIC BLOOD PRESSURE: 147 MMHG | HEIGHT: 62 IN | DIASTOLIC BLOOD PRESSURE: 64 MMHG | RESPIRATION RATE: 17 BRPM | HEART RATE: 92 BPM | OXYGEN SATURATION: 96 %

## 2021-12-11 DIAGNOSIS — F41.9 ANXIETY AND DEPRESSION: ICD-10-CM

## 2021-12-11 DIAGNOSIS — F32.A ANXIETY AND DEPRESSION: ICD-10-CM

## 2021-12-11 NOTE — ED INITIAL ASSESSMENT (HPI)
Patient to the ED for a magnesium level is low. Patient denies any dizziness or shortness of breath. Denies any other symptoms.

## 2021-12-11 NOTE — TELEPHONE ENCOUNTER
Spoke with Halina Cheadle. Explained that low magnesium is dangerous and could cause an arrhythmia. Advised patient to got to the ER to replete magnesium safely. Patient verbalized understanding.

## 2021-12-11 NOTE — ED PROVIDER NOTES
Patient Seen in: [facility] Emergency Department      History   No chief complaint on file.     Stated Complaint: abnormal labs- critically low magnesium    Subjective:   HPI    This is a pleasant 27-year-old female presenting to the emergency depart signs reviewed. All other systems reviewed and negative except as noted above.     Physical Exam     ED Triage Vitals [12/10/21 2015]   BP (!) 180/73   Pulse 67   Resp 18   Temp 98.2 °F (36.8 °C)   Temp src Oral   SpO2 97 %   O2 Device None (Room air) Final result                 Please view results for these tests on the individual orders.                    MDM      Patient was given a magnesium supplement here in the emergency department she remains asymptomatic she will be discharged home

## 2021-12-13 ENCOUNTER — TELEPHONE (OUTPATIENT)
Dept: FAMILY MEDICINE CLINIC | Facility: CLINIC | Age: 81
End: 2021-12-13

## 2021-12-13 DIAGNOSIS — E83.42 HYPOMAGNESEMIA: Primary | ICD-10-CM

## 2021-12-13 RX ORDER — SERTRALINE HYDROCHLORIDE 100 MG/1
TABLET, FILM COATED ORAL
Qty: 90 TABLET | Refills: 1 | Status: SHIPPED | OUTPATIENT
Start: 2021-12-13

## 2021-12-13 NOTE — TELEPHONE ENCOUNTER
LOV: 11/22/21  for: Anxiety and depression  Patient advised to RTC on:  6 months  Previous Rx:  Sertraline 100mgà Sig: Take 1 tablet by mouth every day. Disp #90/0 refills.   LF: 9/7/21

## 2021-12-13 NOTE — TELEPHONE ENCOUNTER
PAULINA. to pt. appt made for a follow up Er visit with Dr. Mikael Elizabeth on 12/16 at 3:45 PM. Pt was instructed to get a CMP and magnesium level on Wednesday. Pt. Agreed to plan and verbalized understanding Orders placed.
Never smoker

## 2021-12-14 DIAGNOSIS — E87.79 OTHER HYPERVOLEMIA: ICD-10-CM

## 2021-12-14 DIAGNOSIS — I10 ESSENTIAL (PRIMARY) HYPERTENSION: ICD-10-CM

## 2021-12-14 DIAGNOSIS — R06.02 SHORTNESS OF BREATH: ICD-10-CM

## 2021-12-14 RX ORDER — POTASSIUM CHLORIDE 750 MG/1
TABLET, FILM COATED, EXTENDED RELEASE ORAL
Qty: 90 TABLET | Refills: 0 | Status: SHIPPED | OUTPATIENT
Start: 2021-12-14

## 2021-12-14 RX ORDER — PANTOPRAZOLE SODIUM 40 MG/1
TABLET, DELAYED RELEASE ORAL
Qty: 90 TABLET | Refills: 0 | Status: SHIPPED | OUTPATIENT
Start: 2021-12-14

## 2021-12-14 RX ORDER — LISINOPRIL 20 MG/1
TABLET ORAL
Qty: 90 TABLET | Refills: 0 | Status: SHIPPED | OUTPATIENT
Start: 2021-12-14

## 2021-12-14 NOTE — TELEPHONE ENCOUNTER
Potassium and Pantoprazole are not protocol medications.   Last OV 11/22/21  Last refills of both 9/20/21 #90

## 2021-12-15 ENCOUNTER — LAB ENCOUNTER (OUTPATIENT)
Dept: LAB | Age: 81
End: 2021-12-15
Attending: FAMILY MEDICINE
Payer: MEDICARE

## 2021-12-15 DIAGNOSIS — E83.42 HYPOMAGNESEMIA: ICD-10-CM

## 2021-12-15 PROCEDURE — 80053 COMPREHEN METABOLIC PANEL: CPT

## 2021-12-15 PROCEDURE — 83735 ASSAY OF MAGNESIUM: CPT

## 2021-12-15 PROCEDURE — 36415 COLL VENOUS BLD VENIPUNCTURE: CPT

## 2021-12-16 ENCOUNTER — OFFICE VISIT (OUTPATIENT)
Dept: FAMILY MEDICINE CLINIC | Facility: CLINIC | Age: 81
End: 2021-12-16
Payer: MEDICARE

## 2021-12-16 VITALS
HEIGHT: 62 IN | HEART RATE: 70 BPM | RESPIRATION RATE: 20 BRPM | WEIGHT: 226 LBS | TEMPERATURE: 98 F | SYSTOLIC BLOOD PRESSURE: 140 MMHG | DIASTOLIC BLOOD PRESSURE: 66 MMHG | BODY MASS INDEX: 41.59 KG/M2

## 2021-12-16 DIAGNOSIS — E83.51 HYPOCALCEMIA: ICD-10-CM

## 2021-12-16 DIAGNOSIS — E83.42 HYPOMAGNESEMIA: Primary | ICD-10-CM

## 2021-12-16 DIAGNOSIS — E88.09 HYPOALBUMINEMIA: ICD-10-CM

## 2021-12-16 DIAGNOSIS — I10 ESSENTIAL (PRIMARY) HYPERTENSION: ICD-10-CM

## 2021-12-16 PROCEDURE — 99214 OFFICE O/P EST MOD 30 MIN: CPT | Performed by: FAMILY MEDICINE

## 2021-12-20 DIAGNOSIS — E03.9 HYPOTHYROIDISM, UNSPECIFIED TYPE: ICD-10-CM

## 2021-12-20 RX ORDER — LEVOTHYROXINE SODIUM 0.07 MG/1
75 TABLET ORAL
Qty: 90 TABLET | Refills: 1 | Status: SHIPPED | OUTPATIENT
Start: 2021-12-20

## 2021-12-20 NOTE — PROGRESS NOTES
Subjective:   Cathi Delatorre is a [de-identified]year old female who presents for Er F/u (low magnesium and anemia)     Patient is here for follow-up after recent hospitalization for a critically low magnesium level. Patient was given magnesium the IV.   She states t (Patient not taking: No sig reported) 90 tablet 1   • ELIQUIS 5 MG Oral Tab TAKE 1 TABLET BY MOUTH EVERY DAY (Patient not taking: Reported on 12/16/2021) 90 tablet 1       Review of Systems:  Pertinent items are noted in HPI.       Objective:   /66 (B

## 2021-12-22 ENCOUNTER — TELEPHONE (OUTPATIENT)
Dept: FAMILY MEDICINE CLINIC | Facility: CLINIC | Age: 81
End: 2021-12-22

## 2021-12-22 NOTE — TELEPHONE ENCOUNTER
Pt requesting call back from [facility] or any nurse. Pt needs application for disability placard filled out by Dr Fernando Pagan. Per patient has not discussed with Dr Fernando Pagan about this application yet.     Pt previously was having this filled out by surgeon

## 2021-12-23 ENCOUNTER — TELEPHONE (OUTPATIENT)
Dept: FAMILY MEDICINE CLINIC | Facility: CLINIC | Age: 81
End: 2021-12-23

## 2021-12-23 NOTE — TELEPHONE ENCOUNTER
Pt  dropped off Disabilities Certification for Parking Placard Renewal for Dr Kacy Barbosa to fill out. Form placed in Dr Antonio's bin.

## 2021-12-28 ENCOUNTER — MED REC SCAN ONLY (OUTPATIENT)
Dept: FAMILY MEDICINE CLINIC | Facility: CLINIC | Age: 81
End: 2021-12-28

## 2021-12-30 DIAGNOSIS — E03.9 HYPOTHYROIDISM, UNSPECIFIED TYPE: ICD-10-CM

## 2021-12-30 RX ORDER — LEVOTHYROXINE SODIUM 0.2 MG/1
TABLET ORAL
Qty: 90 TABLET | Refills: 0 | Status: SHIPPED | OUTPATIENT
Start: 2021-12-30

## 2022-01-11 ENCOUNTER — MED REC SCAN ONLY (OUTPATIENT)
Dept: FAMILY MEDICINE CLINIC | Facility: CLINIC | Age: 82
End: 2022-01-11

## 2022-01-12 DIAGNOSIS — E78.2 MIXED HYPERLIPIDEMIA: ICD-10-CM

## 2022-01-12 RX ORDER — ATORVASTATIN CALCIUM 10 MG/1
TABLET, FILM COATED ORAL
Qty: 90 TABLET | Refills: 1 | Status: SHIPPED | OUTPATIENT
Start: 2022-01-12

## 2022-01-26 ENCOUNTER — OFFICE VISIT (OUTPATIENT)
Dept: ORTHOPEDICS CLINIC | Facility: CLINIC | Age: 82
End: 2022-01-26
Payer: MEDICARE

## 2022-01-26 ENCOUNTER — HOSPITAL ENCOUNTER (OUTPATIENT)
Dept: GENERAL RADIOLOGY | Age: 82
Discharge: HOME | End: 2022-01-26
Attending: ORTHOPAEDIC SURGERY
Payer: MEDICARE

## 2022-01-26 DIAGNOSIS — R06.02 SHORTNESS OF BREATH: ICD-10-CM

## 2022-01-26 DIAGNOSIS — S52.135D CLOSED NONDISPLACED FRACTURE OF NECK OF LEFT RADIUS WITH ROUTINE HEALING, SUBSEQUENT ENCOUNTER: ICD-10-CM

## 2022-01-26 DIAGNOSIS — S52.135D CLOSED NONDISPLACED FRACTURE OF NECK OF LEFT RADIUS WITH ROUTINE HEALING, SUBSEQUENT ENCOUNTER: Primary | ICD-10-CM

## 2022-01-26 DIAGNOSIS — E03.9 HYPOTHYROIDISM, UNSPECIFIED TYPE: ICD-10-CM

## 2022-01-26 DIAGNOSIS — E87.79 OTHER HYPERVOLEMIA: ICD-10-CM

## 2022-01-26 DIAGNOSIS — I10 ESSENTIAL (PRIMARY) HYPERTENSION: ICD-10-CM

## 2022-01-26 PROCEDURE — 99024 POSTOP FOLLOW-UP VISIT: CPT | Performed by: ORTHOPAEDIC SURGERY

## 2022-01-26 PROCEDURE — 73080 X-RAY EXAM OF ELBOW: CPT | Performed by: ORTHOPAEDIC SURGERY

## 2022-01-26 RX ORDER — POTASSIUM CHLORIDE 750 MG/1
TABLET, FILM COATED, EXTENDED RELEASE ORAL
Qty: 90 TABLET | Refills: 0 | OUTPATIENT
Start: 2022-01-26

## 2022-01-26 RX ORDER — NIACIN 100 MG
100 TABLET ORAL NIGHTLY
COMMUNITY

## 2022-01-26 RX ORDER — PREDNISOLONE ACETATE 10 MG/ML
SUSPENSION/ DROPS OPHTHALMIC
COMMUNITY
Start: 2022-01-24

## 2022-01-26 RX ORDER — LEVOTHYROXINE SODIUM 0.2 MG/1
TABLET ORAL
Qty: 90 TABLET | Refills: 0 | OUTPATIENT
Start: 2022-01-26

## 2022-01-26 RX ORDER — LISINOPRIL 20 MG/1
TABLET ORAL
Qty: 90 TABLET | Refills: 0 | OUTPATIENT
Start: 2022-01-26

## 2022-01-26 NOTE — PROGRESS NOTES
[facility] Fracture follow-up Progress Note      Date of Injury: 11/4/2021      History: Leonor Tristan is an 81-year-old female presenting for follow-up for her left elbow fracture.   She has been working on range of motion reporting no pain with this bu

## 2022-02-01 RX ORDER — FUROSEMIDE 20 MG/1
TABLET ORAL
Qty: 90 TABLET | Refills: 1 | Status: SHIPPED | OUTPATIENT
Start: 2022-02-01

## 2022-03-11 RX ORDER — PANTOPRAZOLE SODIUM 40 MG/1
TABLET, DELAYED RELEASE ORAL
Qty: 90 TABLET | Refills: 0 | Status: SHIPPED | OUTPATIENT
Start: 2022-03-11

## 2022-05-22 DIAGNOSIS — F32.A ANXIETY AND DEPRESSION: ICD-10-CM

## 2022-05-22 DIAGNOSIS — F41.9 ANXIETY AND DEPRESSION: ICD-10-CM

## 2022-05-23 RX ORDER — LORAZEPAM 0.5 MG/1
TABLET ORAL
Qty: 30 TABLET | Refills: 0 | Status: SHIPPED | OUTPATIENT
Start: 2022-05-23

## 2022-06-19 DIAGNOSIS — F32.A ANXIETY AND DEPRESSION: ICD-10-CM

## 2022-06-19 DIAGNOSIS — E03.9 HYPOTHYROIDISM, UNSPECIFIED TYPE: ICD-10-CM

## 2022-06-19 DIAGNOSIS — F41.9 ANXIETY AND DEPRESSION: ICD-10-CM

## 2022-06-20 RX ORDER — LEVOTHYROXINE SODIUM 0.07 MG/1
75 TABLET ORAL
Qty: 30 TABLET | Refills: 1 | Status: SHIPPED | OUTPATIENT
Start: 2022-06-20

## 2022-06-20 RX ORDER — SERTRALINE HYDROCHLORIDE 100 MG/1
TABLET, FILM COATED ORAL
Qty: 30 TABLET | Refills: 1 | Status: SHIPPED | OUTPATIENT
Start: 2022-06-20

## 2022-06-20 RX ORDER — PANTOPRAZOLE SODIUM 40 MG/1
TABLET, DELAYED RELEASE ORAL
Qty: 30 TABLET | Refills: 1 | Status: SHIPPED | OUTPATIENT
Start: 2022-06-20

## 2022-06-20 NOTE — TELEPHONE ENCOUNTER
SERTRALINE  MG TABLET    Please see pended medications. Please sign if appropriate.       Thank you      Last OV: 12/16/2021      Last refill: 12/13/2021 fr 90/1 refill      Upcoming appt is scheduled for 07/26/2022

## 2022-07-18 DIAGNOSIS — F32.A ANXIETY AND DEPRESSION: ICD-10-CM

## 2022-07-18 DIAGNOSIS — E03.9 HYPOTHYROIDISM, UNSPECIFIED TYPE: ICD-10-CM

## 2022-07-18 DIAGNOSIS — F41.9 ANXIETY AND DEPRESSION: ICD-10-CM

## 2022-07-18 RX ORDER — PANTOPRAZOLE SODIUM 40 MG/1
TABLET, DELAYED RELEASE ORAL
Qty: 30 TABLET | Refills: 1 | Status: SHIPPED | OUTPATIENT
Start: 2022-07-18

## 2022-07-18 RX ORDER — LEVOTHYROXINE SODIUM 0.07 MG/1
75 TABLET ORAL
Qty: 30 TABLET | Refills: 1 | Status: SHIPPED | OUTPATIENT
Start: 2022-07-18

## 2022-07-18 RX ORDER — SERTRALINE HYDROCHLORIDE 100 MG/1
TABLET, FILM COATED ORAL
Qty: 30 TABLET | Refills: 1 | Status: SHIPPED | OUTPATIENT
Start: 2022-07-18

## 2022-07-26 ENCOUNTER — TELEPHONE (OUTPATIENT)
Dept: FAMILY MEDICINE CLINIC | Facility: CLINIC | Age: 82
End: 2022-07-26

## 2022-07-28 DIAGNOSIS — E78.2 MIXED HYPERLIPIDEMIA: ICD-10-CM

## 2022-07-28 RX ORDER — ATORVASTATIN CALCIUM 10 MG/1
TABLET, FILM COATED ORAL
Qty: 90 TABLET | Refills: 1 | Status: SHIPPED | OUTPATIENT
Start: 2022-07-28

## 2022-08-02 DIAGNOSIS — F41.9 ANXIETY AND DEPRESSION: ICD-10-CM

## 2022-08-02 DIAGNOSIS — E03.9 HYPOTHYROIDISM, UNSPECIFIED TYPE: ICD-10-CM

## 2022-08-02 DIAGNOSIS — F32.A ANXIETY AND DEPRESSION: ICD-10-CM

## 2022-08-02 RX ORDER — LEVOTHYROXINE SODIUM 0.07 MG/1
75 TABLET ORAL
Qty: 90 TABLET | Refills: 1 | Status: SHIPPED | OUTPATIENT
Start: 2022-08-02

## 2022-08-02 RX ORDER — PANTOPRAZOLE SODIUM 40 MG/1
40 TABLET, DELAYED RELEASE ORAL DAILY
Qty: 90 TABLET | Refills: 0 | Status: SHIPPED | OUTPATIENT
Start: 2022-08-02

## 2022-08-02 NOTE — TELEPHONE ENCOUNTER
LOV 12/16/2021  Pt was suppose to have a visit on 07/26/2022 and was a no show. [facility] sent to pt to call and reschedule her appt. Quality 110: Preventive Care And Screening: Influenza Immunization: Influenza Immunization previously received during influenza season Quality 226: Preventive Care And Screening: Tobacco Use: Screening And Cessation Intervention: Patient screened for tobacco use and is an ex/non-smoker Detail Level: Detailed

## 2022-08-03 RX ORDER — SERTRALINE HYDROCHLORIDE 100 MG/1
100 TABLET, FILM COATED ORAL DAILY
Qty: 90 TABLET | Refills: 0 | OUTPATIENT
Start: 2022-08-03

## 2022-09-21 ENCOUNTER — LAB ENCOUNTER (OUTPATIENT)
Dept: LAB | Age: 82
End: 2022-09-21
Attending: FAMILY MEDICINE

## 2022-09-21 DIAGNOSIS — E11.9 TYPE 2 DIABETES MELLITUS WITHOUT COMPLICATION, WITHOUT LONG-TERM CURRENT USE OF INSULIN (HCC): ICD-10-CM

## 2022-09-21 DIAGNOSIS — I10 ESSENTIAL HYPERTENSION: ICD-10-CM

## 2022-09-21 DIAGNOSIS — E78.2 MIXED HYPERLIPIDEMIA: ICD-10-CM

## 2022-09-21 DIAGNOSIS — E78.00 PURE HYPERCHOLESTEROLEMIA: Primary | ICD-10-CM

## 2022-09-21 DIAGNOSIS — E83.42 HYPOMAGNESEMIA: ICD-10-CM

## 2022-09-21 DIAGNOSIS — E88.09 HYPOALBUMINEMIA: ICD-10-CM

## 2022-09-21 DIAGNOSIS — E03.9 HYPOTHYROIDISM, UNSPECIFIED TYPE: ICD-10-CM

## 2022-09-21 DIAGNOSIS — E83.51 HYPOCALCEMIA: ICD-10-CM

## 2022-09-21 LAB
ALBUMIN SERPL-MCNC: 2.6 G/DL (ref 3.4–5)
ALBUMIN/GLOB SERPL: 0.7 {RATIO} (ref 1–2)
ALP LIVER SERPL-CCNC: 96 U/L
ALT SERPL-CCNC: 13 U/L
ANION GAP SERPL CALC-SCNC: 7 MMOL/L (ref 0–18)
AST SERPL-CCNC: 17 U/L (ref 15–37)
BASOPHILS # BLD AUTO: 0.06 X10(3) UL (ref 0–0.2)
BASOPHILS NFR BLD AUTO: 1.1 %
BILIRUB SERPL-MCNC: 0.3 MG/DL (ref 0.1–2)
BUN BLD-MCNC: 24 MG/DL (ref 7–18)
CALCIUM BLD-MCNC: 8.7 MG/DL (ref 8.5–10.1)
CHLORIDE SERPL-SCNC: 112 MMOL/L (ref 98–112)
CHOLEST SERPL-MCNC: 150 MG/DL (ref ?–200)
CO2 SERPL-SCNC: 23 MMOL/L (ref 21–32)
CREAT BLD-MCNC: 1.49 MG/DL
CREAT UR-SCNC: 158 MG/DL
EOSINOPHIL # BLD AUTO: 0.3 X10(3) UL (ref 0–0.7)
EOSINOPHIL NFR BLD AUTO: 5.3 %
ERYTHROCYTE [DISTWIDTH] IN BLOOD BY AUTOMATED COUNT: 18.3 %
EST. AVERAGE GLUCOSE BLD GHB EST-MCNC: 120 MG/DL (ref 68–126)
FASTING PATIENT LIPID ANSWER: YES
FASTING STATUS PATIENT QL REPORTED: YES
GFR SERPLBLD BASED ON 1.73 SQ M-ARVRAT: 35 ML/MIN/1.73M2 (ref 60–?)
GLOBULIN PLAS-MCNC: 4 G/DL (ref 2.8–4.4)
GLUCOSE BLD-MCNC: 77 MG/DL (ref 70–99)
HBA1C MFR BLD: 5.8 % (ref ?–5.7)
HCT VFR BLD AUTO: 35.5 %
HDLC SERPL-MCNC: 60 MG/DL (ref 40–59)
HGB BLD-MCNC: 10.8 G/DL
IMM GRANULOCYTES # BLD AUTO: 0.01 X10(3) UL (ref 0–1)
IMM GRANULOCYTES NFR BLD: 0.2 %
LDLC SERPL CALC-MCNC: 69 MG/DL (ref ?–100)
LYMPHOCYTES # BLD AUTO: 0.96 X10(3) UL (ref 1–4)
LYMPHOCYTES NFR BLD AUTO: 17.1 %
MAGNESIUM SERPL-MCNC: 1.8 MG/DL (ref 1.6–2.6)
MCH RBC QN AUTO: 30.3 PG (ref 26–34)
MCHC RBC AUTO-ENTMCNC: 30.4 G/DL (ref 31–37)
MCV RBC AUTO: 99.7 FL
MICROALBUMIN UR-MCNC: 1.81 MG/DL
MICROALBUMIN/CREAT 24H UR-RTO: 11.5 UG/MG (ref ?–30)
MONOCYTES # BLD AUTO: 0.49 X10(3) UL (ref 0.1–1)
MONOCYTES NFR BLD AUTO: 8.7 %
NEUTROPHILS # BLD AUTO: 3.81 X10 (3) UL (ref 1.5–7.7)
NEUTROPHILS # BLD AUTO: 3.81 X10(3) UL (ref 1.5–7.7)
NEUTROPHILS NFR BLD AUTO: 67.6 %
NONHDLC SERPL-MCNC: 90 MG/DL (ref ?–130)
OSMOLALITY SERPL CALC.SUM OF ELEC: 297 MOSM/KG (ref 275–295)
PLATELET # BLD AUTO: 263 10(3)UL (ref 150–450)
POTASSIUM SERPL-SCNC: 5 MMOL/L (ref 3.5–5.1)
PROT SERPL-MCNC: 6.6 G/DL (ref 6.4–8.2)
RBC # BLD AUTO: 3.56 X10(6)UL
SODIUM SERPL-SCNC: 142 MMOL/L (ref 136–145)
T4 FREE SERPL-MCNC: 0.7 NG/DL (ref 0.8–1.7)
TRIGL SERPL-MCNC: 119 MG/DL (ref 30–149)
TSI SER-ACNC: 42.9 MIU/ML (ref 0.36–3.74)
VLDLC SERPL CALC-MCNC: 18 MG/DL (ref 0–30)
WBC # BLD AUTO: 5.6 X10(3) UL (ref 4–11)

## 2022-09-21 PROCEDURE — 85025 COMPLETE CBC W/AUTO DIFF WBC: CPT

## 2022-09-21 PROCEDURE — 84439 ASSAY OF FREE THYROXINE: CPT

## 2022-09-21 PROCEDURE — 83036 HEMOGLOBIN GLYCOSYLATED A1C: CPT

## 2022-09-21 PROCEDURE — 80053 COMPREHEN METABOLIC PANEL: CPT

## 2022-09-21 PROCEDURE — 36415 COLL VENOUS BLD VENIPUNCTURE: CPT

## 2022-09-21 PROCEDURE — 84443 ASSAY THYROID STIM HORMONE: CPT

## 2022-09-21 PROCEDURE — 82570 ASSAY OF URINE CREATININE: CPT

## 2022-09-21 PROCEDURE — 80061 LIPID PANEL: CPT

## 2022-09-21 PROCEDURE — 83735 ASSAY OF MAGNESIUM: CPT

## 2022-09-21 PROCEDURE — 82043 UR ALBUMIN QUANTITATIVE: CPT

## 2022-09-26 ENCOUNTER — OFFICE VISIT (OUTPATIENT)
Dept: FAMILY MEDICINE CLINIC | Facility: CLINIC | Age: 82
End: 2022-09-26

## 2022-09-26 VITALS
RESPIRATION RATE: 16 BRPM | SYSTOLIC BLOOD PRESSURE: 122 MMHG | HEART RATE: 72 BPM | WEIGHT: 214 LBS | TEMPERATURE: 98 F | HEIGHT: 62 IN | DIASTOLIC BLOOD PRESSURE: 68 MMHG | BODY MASS INDEX: 39.38 KG/M2

## 2022-09-26 DIAGNOSIS — I35.0 AORTIC STENOSIS, MODERATE: ICD-10-CM

## 2022-09-26 DIAGNOSIS — E78.2 MIXED HYPERLIPIDEMIA: ICD-10-CM

## 2022-09-26 DIAGNOSIS — E11.9 TYPE 2 DIABETES MELLITUS WITHOUT COMPLICATION, WITHOUT LONG-TERM CURRENT USE OF INSULIN (HCC): ICD-10-CM

## 2022-09-26 DIAGNOSIS — E03.9 HYPOTHYROIDISM, UNSPECIFIED TYPE: ICD-10-CM

## 2022-09-26 DIAGNOSIS — K21.9 GASTROESOPHAGEAL REFLUX DISEASE WITHOUT ESOPHAGITIS: ICD-10-CM

## 2022-09-26 DIAGNOSIS — F41.9 ANXIETY AND DEPRESSION: ICD-10-CM

## 2022-09-26 DIAGNOSIS — F32.A ANXIETY AND DEPRESSION: ICD-10-CM

## 2022-09-26 DIAGNOSIS — I25.10 CORONARY ARTERY DISEASE INVOLVING NATIVE CORONARY ARTERY OF NATIVE HEART WITHOUT ANGINA PECTORIS: ICD-10-CM

## 2022-09-26 DIAGNOSIS — Z00.00 ENCOUNTER FOR ANNUAL HEALTH EXAMINATION: Primary | ICD-10-CM

## 2022-09-26 DIAGNOSIS — I10 ESSENTIAL HYPERTENSION: ICD-10-CM

## 2022-09-26 PROCEDURE — 1126F AMNT PAIN NOTED NONE PRSNT: CPT | Performed by: FAMILY MEDICINE

## 2022-09-26 PROCEDURE — 99213 OFFICE O/P EST LOW 20 MIN: CPT | Performed by: FAMILY MEDICINE

## 2022-09-26 PROCEDURE — G0439 PPPS, SUBSEQ VISIT: HCPCS | Performed by: FAMILY MEDICINE

## 2022-10-01 ENCOUNTER — APPOINTMENT (OUTPATIENT)
Dept: CT IMAGING | Facility: HOSPITAL | Age: 82
End: 2022-10-01
Attending: EMERGENCY MEDICINE
Payer: MEDICARE

## 2022-10-01 ENCOUNTER — HOSPITAL ENCOUNTER (INPATIENT)
Facility: HOSPITAL | Age: 82
LOS: 4 days | Discharge: ANOTHER HOSPITAL | End: 2022-10-06
Attending: EMERGENCY MEDICINE | Admitting: HOSPITALIST
Payer: MEDICARE

## 2022-10-01 ENCOUNTER — HOSPITAL ENCOUNTER (INPATIENT)
Facility: HOSPITAL | Age: 82
LOS: 4 days | Discharge: NURSING, REHAB OR LONG-TERM CARE | End: 2022-10-06
Attending: EMERGENCY MEDICINE | Admitting: HOSPITALIST
Payer: MEDICARE

## 2022-10-01 DIAGNOSIS — I63.9 ACUTE CVA (CEREBROVASCULAR ACCIDENT) (HCC): Primary | ICD-10-CM

## 2022-10-01 DIAGNOSIS — D64.9 ANEMIA, UNSPECIFIED TYPE: ICD-10-CM

## 2022-10-01 LAB
ALBUMIN SERPL-MCNC: 2.7 G/DL (ref 3.4–5)
ALBUMIN/GLOB SERPL: 0.8 {RATIO} (ref 1–2)
ALP LIVER SERPL-CCNC: 91 U/L
ALT SERPL-CCNC: 12 U/L
ANION GAP SERPL CALC-SCNC: 2 MMOL/L (ref 0–18)
APTT PPP: 26.5 SECONDS (ref 23.3–35.6)
AST SERPL-CCNC: 16 U/L (ref 15–37)
BASOPHILS # BLD AUTO: 0.06 X10(3) UL (ref 0–0.2)
BASOPHILS NFR BLD AUTO: 1 %
BILIRUB SERPL-MCNC: 0.2 MG/DL (ref 0.1–2)
BILIRUB UR QL STRIP.AUTO: NEGATIVE
BUN BLD-MCNC: 19 MG/DL (ref 7–18)
CALCIUM BLD-MCNC: 7.9 MG/DL (ref 8.5–10.1)
CHLORIDE SERPL-SCNC: 117 MMOL/L (ref 98–112)
CHOLEST SERPL-MCNC: 150 MG/DL (ref ?–200)
CLARITY UR REFRACT.AUTO: CLEAR
CO2 SERPL-SCNC: 24 MMOL/L (ref 21–32)
COLOR UR AUTO: YELLOW
CREAT BLD-MCNC: 1.37 MG/DL
EOSINOPHIL # BLD AUTO: 0.27 X10(3) UL (ref 0–0.7)
EOSINOPHIL NFR BLD AUTO: 4.6 %
ERYTHROCYTE [DISTWIDTH] IN BLOOD BY AUTOMATED COUNT: 18.6 %
GFR SERPLBLD BASED ON 1.73 SQ M-ARVRAT: 39 ML/MIN/1.73M2 (ref 60–?)
GLOBULIN PLAS-MCNC: 3.5 G/DL (ref 2.8–4.4)
GLUCOSE BLD-MCNC: 101 MG/DL (ref 70–99)
GLUCOSE BLD-MCNC: 90 MG/DL (ref 70–99)
GLUCOSE UR STRIP.AUTO-MCNC: NEGATIVE MG/DL
HCT VFR BLD AUTO: 33.7 %
HDLC SERPL-MCNC: 56 MG/DL (ref 40–59)
HGB BLD-MCNC: 10.4 G/DL
HYALINE CASTS #/AREA URNS AUTO: PRESENT /LPF
IMM GRANULOCYTES # BLD AUTO: 0.01 X10(3) UL (ref 0–1)
IMM GRANULOCYTES NFR BLD: 0.2 %
INR BLD: 0.95 (ref 0.85–1.16)
KETONES UR STRIP.AUTO-MCNC: NEGATIVE MG/DL
LDLC SERPL CALC-MCNC: 66 MG/DL (ref ?–100)
LYMPHOCYTES # BLD AUTO: 1.07 X10(3) UL (ref 1–4)
LYMPHOCYTES NFR BLD AUTO: 18.3 %
MCH RBC QN AUTO: 29.9 PG (ref 26–34)
MCHC RBC AUTO-ENTMCNC: 30.9 G/DL (ref 31–37)
MCV RBC AUTO: 96.8 FL
MONOCYTES # BLD AUTO: 0.52 X10(3) UL (ref 0.1–1)
MONOCYTES NFR BLD AUTO: 8.9 %
NEUTROPHILS # BLD AUTO: 3.93 X10 (3) UL (ref 1.5–7.7)
NEUTROPHILS # BLD AUTO: 3.93 X10(3) UL (ref 1.5–7.7)
NEUTROPHILS NFR BLD AUTO: 67 %
NITRITE UR QL STRIP.AUTO: NEGATIVE
NONHDLC SERPL-MCNC: 94 MG/DL (ref ?–130)
OSMOLALITY SERPL CALC.SUM OF ELEC: 298 MOSM/KG (ref 275–295)
PH UR STRIP.AUTO: 5 [PH] (ref 5–8)
PLATELET # BLD AUTO: 221 10(3)UL (ref 150–450)
POTASSIUM SERPL-SCNC: 4.5 MMOL/L (ref 3.5–5.1)
PROT SERPL-MCNC: 6.2 G/DL (ref 6.4–8.2)
PROT UR STRIP.AUTO-MCNC: NEGATIVE MG/DL
PROTHROMBIN TIME: 12.7 SECONDS (ref 11.6–14.8)
RBC # BLD AUTO: 3.48 X10(6)UL
RBC UR QL AUTO: NEGATIVE
SARS-COV-2 RNA RESP QL NAA+PROBE: NOT DETECTED
SODIUM SERPL-SCNC: 143 MMOL/L (ref 136–145)
SP GR UR STRIP.AUTO: 1.01 (ref 1–1.03)
TRIGL SERPL-MCNC: 169 MG/DL (ref 30–149)
TROPONIN I HIGH SENSITIVITY: 268 NG/L
TROPONIN I HIGH SENSITIVITY: 292 NG/L
UROBILINOGEN UR STRIP.AUTO-MCNC: <2 MG/DL
VLDLC SERPL CALC-MCNC: 25 MG/DL (ref 0–30)
WBC # BLD AUTO: 5.9 X10(3) UL (ref 4–11)

## 2022-10-01 PROCEDURE — 99292 CRITICAL CARE ADDL 30 MIN: CPT | Performed by: INTERNAL MEDICINE

## 2022-10-01 PROCEDURE — 70498 CT ANGIOGRAPHY NECK: CPT | Performed by: EMERGENCY MEDICINE

## 2022-10-01 PROCEDURE — 99497 ADVNCD CARE PLAN 30 MIN: CPT | Performed by: HOSPITALIST

## 2022-10-01 PROCEDURE — 99223 1ST HOSP IP/OBS HIGH 75: CPT | Performed by: HOSPITALIST

## 2022-10-01 PROCEDURE — 99291 CRITICAL CARE FIRST HOUR: CPT | Performed by: INTERNAL MEDICINE

## 2022-10-01 PROCEDURE — 70496 CT ANGIOGRAPHY HEAD: CPT | Performed by: EMERGENCY MEDICINE

## 2022-10-01 RX ORDER — DIPHENHYDRAMINE HYDROCHLORIDE 50 MG/ML
50 INJECTION INTRAMUSCULAR; INTRAVENOUS ONCE AS NEEDED
Status: DISCONTINUED | OUTPATIENT
Start: 2022-10-01 | End: 2022-10-01

## 2022-10-01 RX ORDER — ASPIRIN 300 MG/1
300 SUPPOSITORY RECTAL ONCE
Status: COMPLETED | OUTPATIENT
Start: 2022-10-01 | End: 2022-10-01

## 2022-10-01 RX ORDER — LABETALOL HYDROCHLORIDE 5 MG/ML
10 INJECTION, SOLUTION INTRAVENOUS EVERY 10 MIN PRN
Status: DISCONTINUED | OUTPATIENT
Start: 2022-10-01 | End: 2022-10-01

## 2022-10-01 RX ORDER — SODIUM CHLORIDE 9 MG/ML
INJECTION, SOLUTION INTRAVENOUS CONTINUOUS
Status: DISCONTINUED | OUTPATIENT
Start: 2022-10-01 | End: 2022-10-03

## 2022-10-01 RX ORDER — SODIUM CHLORIDE 9 MG/ML
1000 INJECTION, SOLUTION INTRAVENOUS ONCE
Status: COMPLETED | OUTPATIENT
Start: 2022-10-01 | End: 2022-10-01

## 2022-10-01 RX ORDER — ACETAMINOPHEN 650 MG/1
650 SUPPOSITORY RECTAL EVERY 4 HOURS PRN
Status: DISCONTINUED | OUTPATIENT
Start: 2022-10-01 | End: 2022-10-01

## 2022-10-01 RX ORDER — METHYLPREDNISOLONE SODIUM SUCCINATE 125 MG/2ML
125 INJECTION, POWDER, LYOPHILIZED, FOR SOLUTION INTRAMUSCULAR; INTRAVENOUS ONCE AS NEEDED
Status: DISCONTINUED | OUTPATIENT
Start: 2022-10-01 | End: 2022-10-01

## 2022-10-01 RX ORDER — FAMOTIDINE 10 MG/ML
20 INJECTION, SOLUTION INTRAVENOUS ONCE AS NEEDED
Status: DISCONTINUED | OUTPATIENT
Start: 2022-10-01 | End: 2022-10-01

## 2022-10-02 ENCOUNTER — APPOINTMENT (OUTPATIENT)
Dept: ULTRASOUND IMAGING | Facility: HOSPITAL | Age: 82
End: 2022-10-02
Attending: Other
Payer: MEDICARE

## 2022-10-02 ENCOUNTER — APPOINTMENT (OUTPATIENT)
Dept: MRI IMAGING | Facility: HOSPITAL | Age: 82
End: 2022-10-02
Attending: Other
Payer: MEDICARE

## 2022-10-02 ENCOUNTER — APPOINTMENT (OUTPATIENT)
Dept: CV DIAGNOSTICS | Facility: HOSPITAL | Age: 82
End: 2022-10-02
Attending: HOSPITALIST
Payer: MEDICARE

## 2022-10-02 ENCOUNTER — APPOINTMENT (OUTPATIENT)
Dept: CT IMAGING | Facility: HOSPITAL | Age: 82
End: 2022-10-02
Attending: EMERGENCY MEDICINE
Payer: MEDICARE

## 2022-10-02 PROBLEM — D64.9 ANEMIA, UNSPECIFIED TYPE: Status: ACTIVE | Noted: 2022-01-01

## 2022-10-02 PROBLEM — Z71.89 ACP (ADVANCE CARE PLANNING): Status: ACTIVE | Noted: 2020-06-22

## 2022-10-02 PROBLEM — D64.9 ANEMIA, UNSPECIFIED TYPE: Status: ACTIVE | Noted: 2022-10-02

## 2022-10-02 LAB
ANION GAP SERPL CALC-SCNC: 4 MMOL/L (ref 0–18)
ATRIAL RATE: 85 BPM
BASOPHILS # BLD AUTO: 0.05 X10(3) UL (ref 0–0.2)
BASOPHILS NFR BLD AUTO: 0.9 %
BUN BLD-MCNC: 18 MG/DL (ref 7–18)
CALCIUM BLD-MCNC: 7.9 MG/DL (ref 8.5–10.1)
CHLORIDE SERPL-SCNC: 116 MMOL/L (ref 98–112)
CO2 SERPL-SCNC: 21 MMOL/L (ref 21–32)
CREAT BLD-MCNC: 1.2 MG/DL
EOSINOPHIL # BLD AUTO: 0.32 X10(3) UL (ref 0–0.7)
EOSINOPHIL NFR BLD AUTO: 5.7 %
ERYTHROCYTE [DISTWIDTH] IN BLOOD BY AUTOMATED COUNT: 18.6 %
GFR SERPLBLD BASED ON 1.73 SQ M-ARVRAT: 45 ML/MIN/1.73M2 (ref 60–?)
GLUCOSE BLD-MCNC: 74 MG/DL (ref 70–99)
GLUCOSE BLD-MCNC: 79 MG/DL (ref 70–99)
GLUCOSE BLD-MCNC: 89 MG/DL (ref 70–99)
GLUCOSE BLD-MCNC: 92 MG/DL (ref 70–99)
HCT VFR BLD AUTO: 33.4 %
HGB BLD-MCNC: 10 G/DL
IMM GRANULOCYTES # BLD AUTO: 0.01 X10(3) UL (ref 0–1)
IMM GRANULOCYTES NFR BLD: 0.2 %
LYMPHOCYTES # BLD AUTO: 1.26 X10(3) UL (ref 1–4)
LYMPHOCYTES NFR BLD AUTO: 22.6 %
MAGNESIUM SERPL-MCNC: 1.5 MG/DL (ref 1.6–2.6)
MCH RBC QN AUTO: 30.2 PG (ref 26–34)
MCHC RBC AUTO-ENTMCNC: 29.9 G/DL (ref 31–37)
MCV RBC AUTO: 100.9 FL
MONOCYTES # BLD AUTO: 0.5 X10(3) UL (ref 0.1–1)
MONOCYTES NFR BLD AUTO: 9 %
NEUTROPHILS # BLD AUTO: 3.44 X10 (3) UL (ref 1.5–7.7)
NEUTROPHILS # BLD AUTO: 3.44 X10(3) UL (ref 1.5–7.7)
NEUTROPHILS NFR BLD AUTO: 61.6 %
OSMOLALITY SERPL CALC.SUM OF ELEC: 293 MOSM/KG (ref 275–295)
P AXIS: 72 DEGREES
P-R INTERVAL: 176 MS
PLATELET # BLD AUTO: 209 10(3)UL (ref 150–450)
POTASSIUM SERPL-SCNC: 4.2 MMOL/L (ref 3.5–5.1)
Q-T INTERVAL: 406 MS
QRS DURATION: 144 MS
QTC CALCULATION (BEZET): 483 MS
R AXIS: -67 DEGREES
RBC # BLD AUTO: 3.31 X10(6)UL
SODIUM SERPL-SCNC: 141 MMOL/L (ref 136–145)
T AXIS: 70 DEGREES
TROPONIN I HIGH SENSITIVITY: 326 NG/L
TROPONIN I HIGH SENSITIVITY: 336 NG/L
VENTRICULAR RATE: 85 BPM
WBC # BLD AUTO: 5.6 X10(3) UL (ref 4–11)

## 2022-10-02 PROCEDURE — 93306 TTE W/DOPPLER COMPLETE: CPT | Performed by: HOSPITALIST

## 2022-10-02 PROCEDURE — 93880 EXTRACRANIAL BILAT STUDY: CPT | Performed by: OTHER

## 2022-10-02 PROCEDURE — 99223 1ST HOSP IP/OBS HIGH 75: CPT | Performed by: NEUROLOGICAL SURGERY

## 2022-10-02 PROCEDURE — 99223 1ST HOSP IP/OBS HIGH 75: CPT | Performed by: OTHER

## 2022-10-02 PROCEDURE — 70551 MRI BRAIN STEM W/O DYE: CPT | Performed by: OTHER

## 2022-10-02 PROCEDURE — 70450 CT HEAD/BRAIN W/O DYE: CPT | Performed by: EMERGENCY MEDICINE

## 2022-10-02 PROCEDURE — 99233 SBSQ HOSP IP/OBS HIGH 50: CPT | Performed by: HOSPITALIST

## 2022-10-02 RX ORDER — ASPIRIN 81 MG/1
81 TABLET ORAL DAILY
Status: DISCONTINUED | OUTPATIENT
Start: 2022-10-03 | End: 2022-10-06

## 2022-10-02 RX ORDER — ONDANSETRON 2 MG/ML
4 INJECTION INTRAMUSCULAR; INTRAVENOUS EVERY 6 HOURS PRN
Status: DISCONTINUED | OUTPATIENT
Start: 2022-10-02 | End: 2022-10-02

## 2022-10-02 RX ORDER — POLYETHYLENE GLYCOL 3350 17 G/17G
17 POWDER, FOR SOLUTION ORAL DAILY PRN
Status: DISCONTINUED | OUTPATIENT
Start: 2022-10-02 | End: 2022-10-06

## 2022-10-02 RX ORDER — ACETAMINOPHEN 500 MG
1000 TABLET ORAL EVERY 4 HOURS PRN
Status: DISCONTINUED | OUTPATIENT
Start: 2022-10-02 | End: 2022-10-06

## 2022-10-02 RX ORDER — LORAZEPAM 2 MG/ML
1 INJECTION INTRAMUSCULAR ONCE
Status: COMPLETED | OUTPATIENT
Start: 2022-10-02 | End: 2022-10-02

## 2022-10-02 RX ORDER — ACETAMINOPHEN 325 MG/1
650 TABLET ORAL EVERY 4 HOURS PRN
Status: DISCONTINUED | OUTPATIENT
Start: 2022-10-02 | End: 2022-10-05

## 2022-10-02 RX ORDER — ATORVASTATIN CALCIUM 80 MG/1
80 TABLET, FILM COATED ORAL NIGHTLY
Status: DISCONTINUED | OUTPATIENT
Start: 2022-10-02 | End: 2022-10-06

## 2022-10-02 RX ORDER — ONDANSETRON 2 MG/ML
4 INJECTION INTRAMUSCULAR; INTRAVENOUS EVERY 6 HOURS PRN
Status: DISCONTINUED | OUTPATIENT
Start: 2022-10-02 | End: 2022-10-06

## 2022-10-02 RX ORDER — ASPIRIN 325 MG
325 TABLET ORAL DAILY
Status: DISCONTINUED | OUTPATIENT
Start: 2022-10-02 | End: 2022-10-02

## 2022-10-02 RX ORDER — MELATONIN
3 NIGHTLY PRN
Status: DISCONTINUED | OUTPATIENT
Start: 2022-10-02 | End: 2022-10-06

## 2022-10-02 RX ORDER — BISACODYL 10 MG
10 SUPPOSITORY, RECTAL RECTAL
Status: DISCONTINUED | OUTPATIENT
Start: 2022-10-02 | End: 2022-10-06

## 2022-10-02 RX ORDER — LABETALOL HYDROCHLORIDE 5 MG/ML
10 INJECTION, SOLUTION INTRAVENOUS EVERY 10 MIN PRN
Status: DISCONTINUED | OUTPATIENT
Start: 2022-10-02 | End: 2022-10-03

## 2022-10-02 RX ORDER — ECHINACEA PURPUREA EXTRACT 125 MG
1 TABLET ORAL
Status: DISCONTINUED | OUTPATIENT
Start: 2022-10-02 | End: 2022-10-06

## 2022-10-02 RX ORDER — CLOPIDOGREL BISULFATE 75 MG/1
75 TABLET ORAL DAILY
Status: DISCONTINUED | OUTPATIENT
Start: 2022-10-02 | End: 2022-10-06

## 2022-10-02 RX ORDER — METOPROLOL TARTRATE 5 MG/5ML
5 INJECTION INTRAVENOUS EVERY 6 HOURS
Status: CANCELLED | OUTPATIENT
Start: 2022-10-02

## 2022-10-02 RX ORDER — ACETAMINOPHEN 650 MG/1
650 SUPPOSITORY RECTAL EVERY 4 HOURS PRN
Status: DISCONTINUED | OUTPATIENT
Start: 2022-10-02 | End: 2022-10-05

## 2022-10-02 RX ORDER — SODIUM CHLORIDE 9 MG/ML
INJECTION, SOLUTION INTRAVENOUS CONTINUOUS
Status: DISCONTINUED | OUTPATIENT
Start: 2022-10-02 | End: 2022-10-03

## 2022-10-02 RX ORDER — SODIUM CHLORIDE 9 MG/ML
INJECTION, SOLUTION INTRAVENOUS CONTINUOUS
Status: ACTIVE | OUTPATIENT
Start: 2022-10-02 | End: 2022-10-02

## 2022-10-02 RX ORDER — PANTOPRAZOLE SODIUM 40 MG/1
40 TABLET, DELAYED RELEASE ORAL DAILY
Status: DISCONTINUED | OUTPATIENT
Start: 2022-10-02 | End: 2022-10-06

## 2022-10-02 RX ORDER — SENNOSIDES 8.6 MG
17.2 TABLET ORAL NIGHTLY PRN
Status: DISCONTINUED | OUTPATIENT
Start: 2022-10-02 | End: 2022-10-06

## 2022-10-02 RX ORDER — ASPIRIN 300 MG/1
300 SUPPOSITORY RECTAL DAILY
Status: DISCONTINUED | OUTPATIENT
Start: 2022-10-02 | End: 2022-10-02

## 2022-10-02 RX ORDER — MAGNESIUM OXIDE 400 MG/1
800 TABLET ORAL ONCE
Status: COMPLETED | OUTPATIENT
Start: 2022-10-02 | End: 2022-10-02

## 2022-10-02 RX ORDER — LEVOTHYROXINE SODIUM 0.07 MG/1
75 TABLET ORAL
Status: DISCONTINUED | OUTPATIENT
Start: 2022-10-02 | End: 2022-10-06

## 2022-10-02 RX ORDER — METOCLOPRAMIDE HYDROCHLORIDE 5 MG/ML
5 INJECTION INTRAMUSCULAR; INTRAVENOUS EVERY 8 HOURS PRN
Status: DISCONTINUED | OUTPATIENT
Start: 2022-10-02 | End: 2022-10-06

## 2022-10-02 NOTE — ED QUICK NOTES
Orders for admission, patient is aware of plan and ready to go upstairs. Any questions, please call ED RN Savita Ralph  at extension [phone]. Vaccinated? Yes  Type of COVID test sent: Rapid PCR  COVID Suspicion level: Low      Titratable drug(s) infusing:N/A  Rate:    LOC at time of transport: AXOX2-3, Intermittent periods of confusion    Other pertinent information: Tenecteplase not d/t time window contraindications.     CIWA score=n/a  NIH score=2

## 2022-10-02 NOTE — ED QUICK NOTES
Pt awake, alert w regular, non-labored respirations. Answering some questions correctly. +confusion. Follows most commands. Equal strength, MAEx4, Symmetrical smile. Provider at bs.  Pt care endorsed to [facility] EMERGENCY [facility]

## 2022-10-02 NOTE — PLAN OF CARE
Pt. A&O x almost 3, can answer portions of the questions correctly, confused, with expressive aphasia and word salad. On RA, NSR on tele. BP elevated but within parameters. Passed bedside swallow eval, diet advanced per orders. NIH daily. Q2 neuro evals until 1000. 0.9 infusing at 75/hr. HOB 30 degrees, aspiration precautions. Q6 accuchecks. Still need med rec and MRI screening form, pt. Unable to answer questions appropriately. Fall and safety precautions in place. Will continue to monitor. 0400 neuro check with mental status changes. Now unable to answer any orientation questions. Expressive aphasia and word salad still present. MD paged. No further orders at this time. Problem: NEUROLOGICAL - ADULT  Goal: Achieves stable or improved neurological status  Description: INTERVENTIONS  - Assess for and report changes in neurological status  - Initiate measures to prevent increased intracranial pressure  - Maintain blood pressure and fluid volume within ordered parameters to optimize cerebral perfusion and minimize risk of hemorrhage  - Monitor temperature, glucose, and sodium.  Initiate appropriate interventions as ordered  Outcome: Progressing  Goal: Achieves maximal functionality and self care  Description: INTERVENTIONS  - Monitor swallowing and airway patency with patient fatigue and changes in neurological status  - Encourage and assist patient to increase activity and self care with guidance from PT/OT  - Encourage visually impaired, hearing impaired and aphasic patients to use assistive/communication devices  Outcome: Progressing     Problem: Impaired Swallowing  Goal: Minimize aspiration risk  Description: Interventions:  - Patient should be alert and upright for all feedings (90 degrees preferred)  - Offer food and liquids at a slow rate  - No straws  - Encourage small bites of food and small sips of liquid  - Offer pills one at a time, crush or deliver with applesauce as needed  - Discontinue feeding and notify MD (or speech pathologist) if coughing or persistent throat clearing or wet/gurgly vocal quality is noted  Outcome: Progressing     Problem: Impaired Cognition  Goal: Patient will exhibit improved attention, thought processing and/or memory  Description: Interventions:  - Allow additional time for processing after asking questions or providing instructions  Outcome: Progressing     Problem: Impaired Communication  Goal: Patient will achieve maximal communication potential  Description: Interventions:  - Allow additional time for processing after asking questions or providing instructions  Outcome: Progressing     Problem: SAFETY ADULT - FALL  Goal: Free from fall injury  Description: INTERVENTIONS:  - Assess pt frequently for physical needs  - Identify cognitive and physical deficits and behaviors that affect risk of falls.   - [place] fall precautions as indicated by assessment.  - Educate pt/family on patient safety including physical limitations  - Instruct pt to call for assistance with activity based on assessment  - Modify environment to reduce risk of injury  - Provide assistive devices as appropriate  - Consider OT/PT consult to assist with strengthening/mobility  - Encourage toileting schedule  Outcome: Progressing     Problem: DISCHARGE PLANNING  Goal: Discharge to home or other facility with appropriate resources  Description: INTERVENTIONS:  - Identify barriers to discharge w/pt and caregiver  - Include patient/family/discharge partner in discharge planning  - Arrange for needed discharge resources and transportation as appropriate  - Identify discharge learning needs (meds, wound care, etc)  - Arrange for interpreters to assist at discharge as needed  - Consider post-discharge preferences of patient/family/discharge partner  - Complete POLST form as appropriate  - Assess patient's ability to be responsible for managing their own health  - Refer to Case Management Department for coordinating discharge planning if the patient needs post-hospital services based on physician/LIP order or complex needs related to functional status, cognitive ability or social support system  Outcome: Progressing

## 2022-10-02 NOTE — CONSULTS
Neurovascular Surgery Note    I was called at 10:20pm this evening regarding an 82yo female with presented with waxing and waning aphasia with LKW at 6pm today. NIHSS now 2. 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 Given distal location of occlusion and low NIHSS, no acute thrombectomy indicated at this time. Discussed with Dr. Charlee Frankel, who agrees with this plan. If her neurological exam declines, we may reconsider at that time. CTA demonstrates left cervical carotid atherosclerotic disease, although the degree of stenosis is unable to be characterized given motion artifact. If she has stenosis, she may be a candidate for revascularization.         Medical management per [facility]   Carotid ultrasound when able

## 2022-10-02 NOTE — PLAN OF CARE
Aphasic and confused. NIH. Neuro checks q2 until 1000. Neurology and neurosx notified of worsening symptoms. MRI stat ordered and accompanied by staff. Family updated on poc per MDs. NSR-SB on tele. HR 50s-60. Denies pain. Purewick. Resting comfortably in bed. WCTM.

## 2022-10-02 NOTE — ED INITIAL ASSESSMENT (HPI)
82YF c/c of confusion Pt family state that pt fall two days ago Pt laid down for a nap today after 1700 and awoke with confusion.  Pt family state that pt confusion has improved at time

## 2022-10-03 LAB
ATRIAL RATE: 60 BPM
GLUCOSE BLD-MCNC: 106 MG/DL (ref 70–99)
GLUCOSE BLD-MCNC: 117 MG/DL (ref 70–99)
GLUCOSE BLD-MCNC: 89 MG/DL (ref 70–99)
GLUCOSE BLD-MCNC: 91 MG/DL (ref 70–99)
GLUCOSE BLD-MCNC: 95 MG/DL (ref 70–99)
MAGNESIUM SERPL-MCNC: 1.5 MG/DL (ref 1.6–2.6)
P AXIS: 32 DEGREES
P-R INTERVAL: 142 MS
Q-T INTERVAL: 458 MS
QRS DURATION: 140 MS
QTC CALCULATION (BEZET): 458 MS
R AXIS: 133 DEGREES
T AXIS: 20 DEGREES
VENTRICULAR RATE: 60 BPM

## 2022-10-03 PROCEDURE — 99232 SBSQ HOSP IP/OBS MODERATE 35: CPT | Performed by: HOSPITALIST

## 2022-10-03 PROCEDURE — 99233 SBSQ HOSP IP/OBS HIGH 50: CPT | Performed by: OTHER

## 2022-10-03 RX ORDER — METOPROLOL SUCCINATE 25 MG/1
25 TABLET, EXTENDED RELEASE ORAL
Status: DISCONTINUED | OUTPATIENT
Start: 2022-10-03 | End: 2022-10-04

## 2022-10-03 RX ORDER — MAGNESIUM OXIDE 400 MG/1
800 TABLET ORAL ONCE
Status: COMPLETED | OUTPATIENT
Start: 2022-10-03 | End: 2022-10-03

## 2022-10-03 RX ORDER — HALOPERIDOL 5 MG/ML
1 INJECTION INTRAMUSCULAR EVERY 6 HOURS PRN
Status: DISCONTINUED | OUTPATIENT
Start: 2022-10-03 | End: 2022-10-06

## 2022-10-03 RX ORDER — HALOPERIDOL 5 MG/ML
2 INJECTION INTRAMUSCULAR ONCE
Status: COMPLETED | OUTPATIENT
Start: 2022-10-03 | End: 2022-10-03

## 2022-10-03 RX ORDER — LABETALOL HYDROCHLORIDE 5 MG/ML
10 INJECTION, SOLUTION INTRAVENOUS EVERY 10 MIN PRN
Status: DISCONTINUED | OUTPATIENT
Start: 2022-10-03 | End: 2022-10-04

## 2022-10-03 RX ORDER — LISINOPRIL 10 MG/1
20 TABLET ORAL DAILY
Status: DISCONTINUED | OUTPATIENT
Start: 2022-10-03 | End: 2022-10-06

## 2022-10-03 NOTE — PLAN OF CARE
Assumed patient care at [address] [place] [address]. Patient is A&Ox2, with expressive aphasia and confusion. Stroke protocol: Neuro cheks Q4h, NIH daily and Accu checks QID. Neurology and neuroSx - following. VSS. Room air. NSR-SB on tele. Cardiac diet. Denies N/V/D/pain. Purewick and brief in place. Up x 1-2 assist with walker. PT rec Acute rehab. All questions/concerns addressed. All needs met at this time. CT Brain result:   1. Redemonstration of small areas of evolving infarction in the left temporoparietal region and left insular region is better seen on the previous MRI of the brain. No evidence of hemorrhagic transformation. 2. Otherwise stable mild chronic microvascular ischemic changes in the cerebral white matter.

## 2022-10-03 NOTE — PROGRESS NOTES
At 0100:  Patient became more confused and impulsive. Patient was removing medical equipments and kept wanting to get out of bed. Redirected and reoriented by the staffs multiple times. Patient started talking aggressively and combative to staffs. Received order for Non-violent restraints and Haldol 2mg IV given per MAR. Bilateral soft wrist and ankle restraints applied. This RN left a voicemail to patient's daughter, waiting for call back. Fall precaution in place. All needs met at this time.

## 2022-10-03 NOTE — PLAN OF CARE
Restraints removed this am at 8am  Patient able to void to bathroom with one standby assist and walker  Has difficulty at times processing direction, but with reinforcement progresses  Declines scds- \"hurt my legs\"  Plan for acute rehab for dc  IVF discontinued  Cardiology consult   Mag replaced per protocol  BP parameters changed to 110-180  Cva education completed  Neuros q4hou  [facility] daily  Bp medications resumed

## 2022-10-03 NOTE — CONSULTS
Chart reviewed. 81year old woman left posterior temporal lobe. Started on DAPT w ASA and lavix, statin. Echo with no shunt. At baseline, patient is mod I for grooming, bathing, dressing. Current functional status: expressive and receptive aphasia. Follows multistep commands. Min assist, lots of cueing required. Min assist for bed mobility. Recommend acute inpatient stroke rehab. EOLS: 2 weeks  Goals: mod I   Rehab potential is good    Medical comorbidities:  Recurrent stroke risk: ASA,Plavix,Stain  Pre-DM with A1C 5.7  CKD      Impairments:  Self care, transfers, balance, gait, cognition, communication.

## 2022-10-03 NOTE — CM/SW NOTE
MSW met with the patient at bedside along with her spouse. We discussed recommendations for Acute rehab. PMR consult placed. The patient is agreeable and would prefer to dc to . Referral sent. GERTRUDIS will continue to follow.     Milan Garcia LCSW

## 2022-10-04 ENCOUNTER — APPOINTMENT (OUTPATIENT)
Dept: ULTRASOUND IMAGING | Facility: HOSPITAL | Age: 82
End: 2022-10-04
Attending: HOSPITALIST
Payer: MEDICARE

## 2022-10-04 LAB
ATRIAL RATE: 49 BPM
GLUCOSE BLD-MCNC: 155 MG/DL (ref 70–99)
GLUCOSE BLD-MCNC: 67 MG/DL (ref 70–99)
GLUCOSE BLD-MCNC: 72 MG/DL (ref 70–99)
GLUCOSE BLD-MCNC: 74 MG/DL (ref 70–99)
GLUCOSE BLD-MCNC: 83 MG/DL (ref 70–99)
GLUCOSE BLD-MCNC: 89 MG/DL (ref 70–99)
MAGNESIUM SERPL-MCNC: 1.7 MG/DL (ref 1.6–2.6)
P AXIS: 35 DEGREES
P-R INTERVAL: 142 MS
Q-T INTERVAL: 488 MS
QRS DURATION: 138 MS
QTC CALCULATION (BEZET): 440 MS
R AXIS: 122 DEGREES
T AXIS: 72 DEGREES
VENTRICULAR RATE: 49 BPM

## 2022-10-04 PROCEDURE — 99233 SBSQ HOSP IP/OBS HIGH 50: CPT | Performed by: HOSPITALIST

## 2022-10-04 PROCEDURE — 93971 EXTREMITY STUDY: CPT | Performed by: HOSPITALIST

## 2022-10-04 RX ORDER — KETOROLAC TROMETHAMINE 30 MG/ML
30 INJECTION, SOLUTION INTRAMUSCULAR; INTRAVENOUS ONCE
Status: COMPLETED | OUTPATIENT
Start: 2022-10-04 | End: 2022-10-04

## 2022-10-04 RX ORDER — ATORVASTATIN CALCIUM 80 MG/1
80 TABLET, FILM COATED ORAL NIGHTLY
Qty: 30 TABLET | Refills: 2 | Status: SHIPPED | OUTPATIENT
Start: 2022-10-04 | End: 2022-10-06

## 2022-10-04 RX ORDER — CLOPIDOGREL BISULFATE 75 MG/1
75 TABLET ORAL DAILY
Qty: 18 TABLET | Refills: 0 | Status: SHIPPED | OUTPATIENT
Start: 2022-10-05

## 2022-10-04 RX ORDER — MAGNESIUM OXIDE 400 MG/1
400 TABLET ORAL ONCE
Status: COMPLETED | OUTPATIENT
Start: 2022-10-04 | End: 2022-10-04

## 2022-10-04 RX ORDER — ASPIRIN 81 MG/1
81 TABLET ORAL DAILY
Qty: 18 TABLET | Refills: 0 | Status: SHIPPED | OUTPATIENT
Start: 2022-10-05

## 2022-10-04 RX ORDER — LABETALOL HYDROCHLORIDE 5 MG/ML
10 INJECTION, SOLUTION INTRAVENOUS EVERY 10 MIN PRN
Status: DISCONTINUED | OUTPATIENT
Start: 2022-10-04 | End: 2022-10-06

## 2022-10-04 NOTE — PLAN OF CARE
A/O x 3. Stroke protocol  Delayed responses, impaired vision on L side baseline. Impaired vision on R side baseline  Neuro Q 4, daily NIH  Room air  Tele SB, BP parameters 110-180  Standby with walker- awaiting MJ auth  Accucheck QID  Will continue to monitor.     Problem: Impaired Swallowing  Goal: Minimize aspiration risk  Description: Interventions:  - Patient should be alert and upright for all feedings (90 degrees preferred)  - Offer food and liquids at a slow rate  - No straws  - Encourage small bites of food and small sips of liquid  - Offer pills one at a time, crush or deliver with applesauce as needed  - Discontinue feeding and notify MD (or speech pathologist) if coughing or persistent throat clearing or wet/gurgly vocal quality is noted  Outcome: Progressing     Problem: Impaired Cognition  Goal: Patient will exhibit improved attention, thought processing and/or memory  Description: Interventions:  - Minimize distractions in the room when full attention is required  - Consider use of a daily journal to improve memory and reduce confusion related to daily events and orientation  - Allow additional time for processing after asking questions or providing instructions  - Encourage use of eye glasses  Outcome: Progressing     Problem: Impaired Communication  Goal: Patient will achieve maximal communication potential  Description: Interventions:  - Ask \"yes/no\" questions to facilitate patient's ability to communicate wants and needs  - Encourage use of hearing aids  - Encourage use of eye glasses  Outcome: Progressing

## 2022-10-04 NOTE — PROGRESS NOTES
Stroke binder given to patient; the following education was provided:     BEFAST - Stroke warning signs and symptoms  Personalized risk factors including HTN, hyperlipidemia, pre-DM and obesity  Need for follow-up after discharge  How to activate EMS for stroke  Healthy lifestyle (nutrition and exercise)    All pertinent questions and concerns were addressed. Patient discharge instructions (References/Attachments) updated.        Mariaelena Wilkinson RN, BSN  Stroke Navigator  [phone]

## 2022-10-04 NOTE — OCCUPATIONAL THERAPY NOTE
OT reviewed patient chart and checked in with nursing. Patient currently off unit for imaging. OT to continue to follow and see patient as scheduling permits.

## 2022-10-04 NOTE — PLAN OF CARE
Pt alert and oriented x 2-3  Aphasia fluctuates   Assumed pt care at 0730  [facility] Daily  Neuro every 4 hours  Pt has c/o right leg pain Tylenol given with minimal relief  Toradol ordered for when pt returns from [address]  EKG completed- per Dr Maile Rosa request   Tele-NSR with PAC's  HR fluctuating 30-50's/Cards notified  PIV left and right AC  Reg/Gen Diet  Accucheck QID  Magnesium replaced     Problem: NEUROLOGICAL - ADULT  Goal: Achieves stable or improved neurological status  Description: INTERVENTIONS  - Assess for and report changes in neurological status  - Initiate measures to prevent increased intracranial pressure  - Maintain blood pressure and fluid volume within ordered parameters to optimize cerebral perfusion and minimize risk of hemorrhage  - Monitor temperature, glucose, and sodium.  Initiate appropriate interventions as ordered  -MAINTAIN BLOOD PRESSURE BETWEEN -180  Outcome: Progressing  Goal: Achieves maximal functionality and self care  Description: INTERVENTIONS  - Monitor swallowing and airway patency with patient fatigue and changes in neurological status  - Encourage and assist patient to increase activity and self care with guidance from PT/OT  - Encourage visually impaired, hearing impaired and aphasic patients to use assistive/communication devices  -FALL PRECAUTIONS  -SPEAK SLOWLY AND GIVE TIME FOR DIRECTION PROCESSING  -USE WRITING FOR COMMUNICATION WHEN APPROPRIATE    Outcome: Progressing     Problem: Impaired Swallowing  Goal: Minimize aspiration risk  Description: Interventions:  - Patient should be alert and upright for all feedings (90 degrees preferred)  - Offer food and liquids at a slow rate  - No straws  - Encourage small bites of food and small sips of liquid  - Offer pills one at a time, crush or deliver with applesauce as needed  - Discontinue feeding and notify MD (or speech pathologist) if coughing or persistent throat clearing or wet/gurgly vocal quality is noted  Outcome: Progressing

## 2022-10-04 NOTE — CM/SW NOTE
MSW, Charge Rn and RN discussed patient's post d/c needs in care rounds.      Plan of Care/Barriers to discharge:  Medical clearance     DC Plan:  Referral to [facility] pending acceptance  No insurance Joana Curtis will be needed

## 2022-10-04 NOTE — SLP NOTE
Attempted follow up for aphasia evaluation. Patient occupied with physician and to have EKG after, per RN. Will hold at this time and continue to follow, completing evaluation as able.     Montine Holter Luite Jimenez 88 CCC-SLP  Pager [phone]

## 2022-10-05 ENCOUNTER — APPOINTMENT (OUTPATIENT)
Dept: GENERAL RADIOLOGY | Facility: HOSPITAL | Age: 82
End: 2022-10-05
Attending: HOSPITALIST
Payer: MEDICARE

## 2022-10-05 LAB
GLUCOSE BLD-MCNC: 65 MG/DL (ref 70–99)
GLUCOSE BLD-MCNC: 73 MG/DL (ref 70–99)
GLUCOSE BLD-MCNC: 77 MG/DL (ref 70–99)
GLUCOSE BLD-MCNC: 81 MG/DL (ref 70–99)
MAGNESIUM SERPL-MCNC: 1.7 MG/DL (ref 1.6–2.6)

## 2022-10-05 PROCEDURE — 73562 X-RAY EXAM OF KNEE 3: CPT | Performed by: HOSPITALIST

## 2022-10-05 RX ORDER — IBUPROFEN 400 MG/1
400 TABLET ORAL EVERY 6 HOURS PRN
Status: DISCONTINUED | OUTPATIENT
Start: 2022-10-05 | End: 2022-10-05

## 2022-10-05 RX ORDER — HYDROCODONE BITARTRATE AND ACETAMINOPHEN 5; 325 MG/1; MG/1
1 TABLET ORAL EVERY 6 HOURS PRN
Status: DISCONTINUED | OUTPATIENT
Start: 2022-10-05 | End: 2022-10-06

## 2022-10-05 RX ORDER — MAGNESIUM OXIDE 400 MG/1
400 TABLET ORAL ONCE
Status: COMPLETED | OUTPATIENT
Start: 2022-10-05 | End: 2022-10-05

## 2022-10-05 NOTE — PHYSICAL THERAPY NOTE
PHYSICAL THERAPY TREATMENT NOTE - INPATIENT    Room Number: 9462/4955-Y     Session: 1     Number of Visits to Meet Established Goals: 5    History related to current admission: Patient is a 81year old female admitted on 10/1/2022 from home for difficulty with word finding, fall 2 days prior to admission. 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 Pt continue to demo R side neglect, presents with impaired balance, decreased endurance and impaired strength below PLOF and will continue to benefit from ongoing IP PT to maximize functional independence. The AM-PAC '6-Clicks' Inpatient Basic Mobility Short Form was completed and this patient is demonstrating a 42% degree of impairment in mobility. Research supports that patients with this level of impairment may benefit from [address]. DISCHARGE RECOMMENDATIONS  PT Discharge Recommendations: Acute rehabilitation     PLAN  PT Treatment Plan: Bed mobility; Endurance; Energy conservation;Patient education;Gait training;Strengthening;Transfer training;Balance training  Rehab Potential : Good  Frequency (Obs): 3-5x/week    CURRENT GOALS        Goal #1 Patient is able to demonstrate supine - sit EOB @ level: modified independent      Goal #2 Patient is able to demonstrate transfers EOB to/from Chair/Wheelchair at assistance level: supervision      Goal #3 Patient is able to ambulate 300 feet with assist device: walker - rolling at assistance level: supervision      Goal #4     Goal #5     Goal #6     Goal Comments: Goals established on 10/2/2022      10/5/2022 all goals ongoing      SUBJECTIVE  \"He keeps checking on me\" referring to her spouse of 62 years     OBJECTIVE  Precautions: Bed/chair alarm (safety mat,  to 200)    WEIGHT BEARING RESTRICTION  Weight Bearing Restriction: None                PAIN ASSESSMENT   Ratin  Location: pt denies pain       BALANCE                                                                                                                       Static Sitting: Fair -  Dynamic Sitting: Fair -           Static Standing: Fair -  Dynamic Standing: Poor +    ACTIVITY TOLERANCE                        O2 WALK         AM-PAC '6-Clicks' INPATIENT SHORT FORM - BASIC MOBILITY  How much difficulty does the patient currently have. .. Patient Difficulty: Turning over in bed (including adjusting bedclothes, sheets and blankets)?: None   Patient Difficulty: Sitting down on and standing up from a chair with arms (e.g., wheelchair, bedside commode, etc.): A Little   Patient Difficulty: Moving from lying on back to sitting on the side of the bed?: A Little   How much help from another person does the patient currently need. .. Help from Another: Moving to and from a bed to a chair (including a wheelchair)?: A Little   Help from Another: Need to walk in hospital room?: A Little   Help from Another: Climbing 3-5 steps with a railing?: A Little       AM-PAC Score:  Raw Score: 19   Approx Degree of Impairment: 41.77%   Standardized Score (AM-PAC Scale): 45.44   CMS Modifier (G-Code): CK    FUNCTIONAL ABILITY STATUS  Gait Assessment   Functional Mobility/Gait Assessment  Gait Assistance: Minimum assistance  Distance (ft): 100  Assistive Device: Rolling walker  Pattern: Shuffle    Skilled Therapy Provided  Pt presents seated in BS chair, spouse present. Pt denies pain in RLE. Pt able to perform AROM therex BLE. Pt gait trained c RW, min A, cues for attention to R, proper integration of RW and for safe turns with RW. Pt performed seated therex BUE. BP monitored 125/54.   Pt left in chair, needs met, alarm on, daughter also present c spouse  at end of session  Bed Mobility:  Rolling right: nt   Rolling left: nt    Supine<>Sit: nt   Sit<>Supine: nt     Transfer Mobility:  Sit<>Stand: CGA   Stand<>Sit: min A , pt tends to put aside RW and reach for chair    Gait: min A           THERAPEUTIC EXERCISES  Lower Extremity Ankle pumps  Knee extension  LAQ     Upper Extremity PNF 02 and Shoulder flex/ext     Position Sitting     Repetitions   10     Sets   1     Patient End of Session: Up in chair;Needs met;Call light within reach;RN aware of session/findings; All patient questions and concerns addressed; Alarm set; Family present

## 2022-10-05 NOTE — PLAN OF CARE
Assumed care at [address]. Alert and oriented x3-4, forgetful. Telemetry monitor reading SB/SR. No pain. Neuro checks remain unchanged. Fall precautions maintained per protocol. Plan for acute rehab. Call light in reach at bedside. Will continue to monitor. Problem: NEUROLOGICAL - ADULT  Goal: Achieves stable or improved neurological status  Description: INTERVENTIONS  - Assess for and report changes in neurological status  - Initiate measures to prevent increased intracranial pressure  - Maintain blood pressure and fluid volume within ordered parameters to optimize cerebral perfusion and minimize risk of hemorrhage  - Monitor temperature, glucose, and sodium.  Initiate appropriate interventions as ordered  -MAINTAIN BLOOD PRESSURE BETWEEN -180  Outcome: Progressing  Goal: Achieves maximal functionality and self care  Description: INTERVENTIONS  - Monitor swallowing and airway patency with patient fatigue and changes in neurological status  - Encourage and assist patient to increase activity and self care with guidance from PT/OT  - Encourage visually impaired, hearing impaired and aphasic patients to use assistive/communication devices  -FALL PRECAUTIONS  -SPEAK SLOWLY AND GIVE TIME FOR DIRECTION PROCESSING  -USE WRITING FOR COMMUNICATION WHEN APPROPRIATE    Outcome: Progressing

## 2022-10-05 NOTE — PLAN OF CARE
Patient alert and oriented x2-3. Aphasia and confused at times. On RA.  NSR on tele. Denies pain. Up in chair. NIH. Neuro checks q shift. EKG completed. Plan for acute rehab. Resting comfortably. WCNETO.

## 2022-10-06 ENCOUNTER — HOSPITAL ENCOUNTER (OUTPATIENT)
Dept: CV DIAGNOSTICS | Facility: HOSPITAL | Age: 82
Discharge: HOME | End: 2022-10-06
Payer: MEDICARE

## 2022-10-06 VITALS
SYSTOLIC BLOOD PRESSURE: 134 MMHG | WEIGHT: 233.38 LBS | DIASTOLIC BLOOD PRESSURE: 55 MMHG | RESPIRATION RATE: 18 BRPM | TEMPERATURE: 99 F | BODY MASS INDEX: 43 KG/M2 | OXYGEN SATURATION: 98 % | HEART RATE: 85 BPM

## 2022-10-06 DIAGNOSIS — I63.9 ACUTE CVA (CEREBROVASCULAR ACCIDENT) (HCC): ICD-10-CM

## 2022-10-06 LAB
GLUCOSE BLD-MCNC: 69 MG/DL (ref 70–99)
GLUCOSE BLD-MCNC: 81 MG/DL (ref 70–99)
GLUCOSE BLD-MCNC: 82 MG/DL (ref 70–99)
GLUCOSE BLD-MCNC: 83 MG/DL (ref 70–99)
MAGNESIUM SERPL-MCNC: 1.8 MG/DL (ref 1.6–2.6)
SARS-COV-2 RNA RESP QL NAA+PROBE: NOT DETECTED

## 2022-10-06 PROCEDURE — 93271 ECG/MONITORING AND ANALYSIS: CPT

## 2022-10-06 PROCEDURE — 93270 REMOTE 30 DAY ECG REV/REPORT: CPT

## 2022-10-06 RX ORDER — HYDROCODONE BITARTRATE AND ACETAMINOPHEN 5; 325 MG/1; MG/1
1 TABLET ORAL EVERY 6 HOURS PRN
Qty: 30 TABLET | Refills: 0 | Status: SHIPPED | OUTPATIENT
Start: 2022-10-06

## 2022-10-06 RX ORDER — METOPROLOL SUCCINATE 25 MG/1
12.5 TABLET, EXTENDED RELEASE ORAL
Qty: 30 TABLET | Refills: 0 | Status: SHIPPED | OUTPATIENT
Start: 2022-10-07

## 2022-10-06 RX ORDER — MAGNESIUM OXIDE 400 MG/1
400 TABLET ORAL ONCE
Status: COMPLETED | OUTPATIENT
Start: 2022-10-06 | End: 2022-10-06

## 2022-10-06 RX ORDER — ATORVASTATIN CALCIUM 40 MG/1
40 TABLET, FILM COATED ORAL NIGHTLY
Status: DISCONTINUED | OUTPATIENT
Start: 2022-10-06 | End: 2022-10-06

## 2022-10-06 RX ORDER — ATORVASTATIN CALCIUM 40 MG/1
40 TABLET, FILM COATED ORAL NIGHTLY
Qty: 30 TABLET | Refills: 0 | Status: SHIPPED | OUTPATIENT
Start: 2022-10-06

## 2022-10-06 NOTE — PROGRESS NOTES
Patient chart reviewed for discharge: Medication Reconciliation completed, Specialist/PCP follow up listed, and disease specific Instructions/Education included in After Visit Summary. Discharge RN notified patient's RN of AVS completion and verified all consultants have signed off. Holter monitor placement confirmed with pick-up time scheduled at 14:30. Patient's RN to notify DC RN if discharge status changes.

## 2022-10-06 NOTE — CM/SW NOTE
MSW met with pt and her spouse. We discussed DC Ride. MSW told pt and spouse that [facility] is not covered by insurance. Pt states she can go by family car.

## 2022-10-06 NOTE — H&P
NURSING DISCHARGE NOTE    Discharged Other, (see nursing note) via Wheelchair. Accompanied by Spouse  Belongings Taken by patient/family. Paperwork sent with . Going to [facility] via .   Report called to IAN

## 2022-10-06 NOTE — PLAN OF CARE
Pt is aox4. Her speech has improved. Able to make needs known. At some dinner. Up in chair during evening. Up with walker and assist to bathroom. Medicated per orders. Accuchecks Q6. Midnight blood sugar 69. Had pt drink glucose drink. Blood sugar increased to 81. Xray of knee completed.  at 2100 but down to 150s for rest of evening. Problem: NEUROLOGICAL - ADULT  Goal: Achieves stable or improved neurological status  Description: INTERVENTIONS  - Assess for and report changes in neurological status  - Initiate measures to prevent increased intracranial pressure  - Maintain blood pressure and fluid volume within ordered parameters to optimize cerebral perfusion and minimize risk of hemorrhage  - Monitor temperature, glucose, and sodium.  Initiate appropriate interventions as ordered  -MAINTAIN BLOOD PRESSURE BETWEEN -180  Outcome: Progressing  Goal: Achieves maximal functionality and self care  Description: INTERVENTIONS  - Monitor swallowing and airway patency with patient fatigue and changes in neurological status  - Encourage and assist patient to increase activity and self care with guidance from PT/OT  - Encourage visually impaired, hearing impaired and aphasic patients to use assistive/communication devices  -FALL PRECAUTIONS  -SPEAK SLOWLY AND GIVE TIME FOR DIRECTION PROCESSING  -USE WRITING FOR COMMUNICATION WHEN APPROPRIATE    Outcome: Progressing     Problem: Impaired Swallowing  Goal: Minimize aspiration risk  Description: Interventions:  - Patient should be alert and upright for all feedings (90 degrees preferred)  - Offer food and liquids at a slow rate  - No straws  - Encourage small bites of food and small sips of liquid  - Offer pills one at a time, crush or deliver with applesauce as needed  - Discontinue feeding and notify MD (or speech pathologist) if coughing or persistent throat clearing or wet/gurgly vocal quality is noted  Outcome: Progressing     Problem: Impaired Cognition  Goal: Patient will exhibit improved attention, thought processing and/or memory  Description: Interventions:    Outcome: Progressing     Problem: Impaired Communication  Goal: Patient will achieve maximal communication potential  Description: Interventions:    Outcome: Progressing     Problem: SAFETY ADULT - FALL  Goal: Free from fall injury  Description: INTERVENTIONS:  - Assess pt frequently for physical needs  - Identify cognitive and physical deficits and behaviors that affect risk of falls.   - [place] fall precautions as indicated by assessment.  - Educate pt/family on patient safety including physical limitations  - Instruct pt to call for assistance with activity based on assessment  - Modify environment to reduce risk of injury  - Provide assistive devices as appropriate  - Consider OT/PT consult to assist with strengthening/mobility  - Encourage toileting schedule  Outcome: Progressing     Problem: DISCHARGE PLANNING  Goal: Discharge to home or other facility with appropriate resources  Description: INTERVENTIONS:  - Identify barriers to discharge w/pt and caregiver  - Include patient/family/discharge partner in discharge planning  - Arrange for needed discharge resources and transportation as appropriate  - Identify discharge learning needs (meds, wound care, etc)  - Arrange for interpreters to assist at discharge as needed  - Consider post-discharge preferences of patient/family/discharge partner  - Complete POLST form as appropriate  - Assess patient's ability to be responsible for managing their own health  - Refer to Case Management Department for coordinating discharge planning if the patient needs post-hospital services based on physician/LIP order or complex needs related to functional status, cognitive ability or social support system  Outcome: Progressing     Problem: Safety Risk - Non-Violent Restraints  Goal: Patient will remain free from self-harm  Description: INTERVENTIONS:  - Apply the least restrictive restraint to prevent harm  - Notify patient and family of reasons restraints applied  - Assess for any contributing factors to confusion (electrolyte disturbances, delirium, medications)  - Discontinue any unnecessary medical devices as soon as possible  - Assess the patient's physical comfort, circulation, skin condition, hydration, nutrition and elimination needs   - Reorient and redirection as needed  - Assess for the need to continue restraints  Outcome: Progressing     Problem: Delirium  Goal: Minimize duration of delirium  Description: Interventions:  - Encourage use of hearing aids, eye glasses  - Promote highest level of mobility daily  - Provide frequent reorientation  - Promote wakefulness i.e. lights on, blinds open  - Promote sleep, encourage patient's normal rest cycle i.e. lights off, TV off, minimize noise and interruptions  - Encourage family to assist in orientation and promotion of home routines  Outcome: Progressing

## 2022-10-06 NOTE — PLAN OF CARE
NURSING DISCHARGE NOTE    Discharged Other, (see nursing note) via Wheelchair. Accompanied by Spouse  Belongings Taken by patient/family. Paperwork sent with . Pt going to cardiac diagnostics for 30 day event monitor. Going to [facility] via .   Report called to IAN

## 2022-10-07 ENCOUNTER — PATIENT OUTREACH (OUTPATIENT)
Dept: CASE MANAGEMENT | Age: 82
End: 2022-10-07

## 2022-10-18 RX ORDER — PANTOPRAZOLE SODIUM 40 MG/1
TABLET, DELAYED RELEASE ORAL
Qty: 90 TABLET | Refills: 1 | Status: SHIPPED | OUTPATIENT
Start: 2022-10-18

## 2022-10-27 ENCOUNTER — OFFICE VISIT (OUTPATIENT)
Dept: FAMILY MEDICINE CLINIC | Facility: CLINIC | Age: 82
End: 2022-10-27
Payer: MEDICARE

## 2022-10-27 VITALS
TEMPERATURE: 97 F | RESPIRATION RATE: 22 BRPM | DIASTOLIC BLOOD PRESSURE: 52 MMHG | HEIGHT: 62 IN | WEIGHT: 227.38 LBS | SYSTOLIC BLOOD PRESSURE: 126 MMHG | HEART RATE: 95 BPM | OXYGEN SATURATION: 93 % | BODY MASS INDEX: 41.84 KG/M2

## 2022-10-27 DIAGNOSIS — F41.9 ANXIETY AND DEPRESSION: ICD-10-CM

## 2022-10-27 DIAGNOSIS — E11.9 TYPE 2 DIABETES MELLITUS WITHOUT COMPLICATION, WITHOUT LONG-TERM CURRENT USE OF INSULIN (HCC): ICD-10-CM

## 2022-10-27 DIAGNOSIS — Z86.73 STATUS POST CVA: Primary | ICD-10-CM

## 2022-10-27 DIAGNOSIS — E78.2 MIXED HYPERLIPIDEMIA: ICD-10-CM

## 2022-10-27 DIAGNOSIS — I25.10 CORONARY ARTERY DISEASE INVOLVING NATIVE CORONARY ARTERY OF NATIVE HEART WITHOUT ANGINA PECTORIS: ICD-10-CM

## 2022-10-27 DIAGNOSIS — E03.9 HYPOTHYROIDISM, UNSPECIFIED TYPE: ICD-10-CM

## 2022-10-27 DIAGNOSIS — F32.A ANXIETY AND DEPRESSION: ICD-10-CM

## 2022-10-27 DIAGNOSIS — R47.89 WORD FINDING DIFFICULTY: ICD-10-CM

## 2022-10-27 DIAGNOSIS — I10 ESSENTIAL HYPERTENSION: ICD-10-CM

## 2022-10-27 PROCEDURE — 1111F DSCHRG MED/CURRENT MED MERGE: CPT | Performed by: FAMILY MEDICINE

## 2022-10-27 PROCEDURE — 99495 TRANSJ CARE MGMT MOD F2F 14D: CPT | Performed by: FAMILY MEDICINE

## 2022-10-27 RX ORDER — MELATONIN
1 NIGHTLY PRN
COMMUNITY
Start: 2022-10-18

## 2022-10-27 RX ORDER — MAGNESIUM OXIDE 400 MG/1
400 TABLET ORAL DAILY
COMMUNITY

## 2022-11-10 ENCOUNTER — OFFICE VISIT (OUTPATIENT)
Dept: NEUROLOGY | Facility: CLINIC | Age: 82
End: 2022-11-10
Payer: MEDICARE

## 2022-11-10 VITALS
WEIGHT: 208 LBS | OXYGEN SATURATION: 96 % | BODY MASS INDEX: 38 KG/M2 | RESPIRATION RATE: 16 BRPM | DIASTOLIC BLOOD PRESSURE: 64 MMHG | SYSTOLIC BLOOD PRESSURE: 130 MMHG | HEART RATE: 86 BPM

## 2022-11-10 DIAGNOSIS — Z86.73 HISTORY OF STROKE: Primary | ICD-10-CM

## 2022-11-10 PROBLEM — D68.69 OTHER THROMBOPHILIA (HCC): Status: ACTIVE | Noted: 2022-11-10

## 2022-11-10 PROBLEM — D68.69 OTHER THROMBOPHILIA (HCC): Status: ACTIVE | Noted: 2022-01-01

## 2022-11-10 PROCEDURE — 99213 OFFICE O/P EST LOW 20 MIN: CPT | Performed by: OTHER

## 2022-11-10 NOTE — PROGRESS NOTES
Patient was seen in the ED on 10/01/2022 for a stroke. Patient came into ED with acute aphasia, patient states improvement in speech since leaving the hospital. Some difficulty with word finding Patient states has improvement in balance. Denies recent falls.

## 2022-12-31 ENCOUNTER — APPOINTMENT (OUTPATIENT)
Dept: CT IMAGING | Facility: HOSPITAL | Age: 82
End: 2022-12-31
Attending: EMERGENCY MEDICINE
Payer: MEDICARE

## 2022-12-31 ENCOUNTER — HOSPITAL ENCOUNTER (EMERGENCY)
Facility: HOSPITAL | Age: 82
Discharge: HOME | End: 2023-01-01
Attending: EMERGENCY MEDICINE
Payer: MEDICARE

## 2022-12-31 DIAGNOSIS — S09.90XA INJURY OF HEAD, INITIAL ENCOUNTER: Primary | ICD-10-CM

## 2022-12-31 DIAGNOSIS — S01.81XA FACIAL LACERATION, INITIAL ENCOUNTER: ICD-10-CM
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
## 2022-12-31 LAB
ALBUMIN SERPL-MCNC: 2.7 G/DL (ref 3.4–5)
ALBUMIN/GLOB SERPL: 0.8 {RATIO} (ref 1–2)
ALP LIVER SERPL-CCNC: 119 U/L
ALT SERPL-CCNC: 10 U/L
ANION GAP SERPL CALC-SCNC: 1 MMOL/L (ref 0–18)
AST SERPL-CCNC: 12 U/L (ref 15–37)
BASOPHILS # BLD AUTO: 0.04 X10(3) UL (ref 0–0.2)
BASOPHILS NFR BLD AUTO: 0.7 %
BILIRUB SERPL-MCNC: 0.2 MG/DL (ref 0.1–2)
BUN BLD-MCNC: 25 MG/DL (ref 7–18)
CALCIUM BLD-MCNC: 7.8 MG/DL (ref 8.5–10.1)
CHLORIDE SERPL-SCNC: 114 MMOL/L (ref 98–112)
CO2 SERPL-SCNC: 27 MMOL/L (ref 21–32)
CREAT BLD-MCNC: 1.41 MG/DL
EOSINOPHIL # BLD AUTO: 0.4 X10(3) UL (ref 0–0.7)
EOSINOPHIL NFR BLD AUTO: 7.3 %
ERYTHROCYTE [DISTWIDTH] IN BLOOD BY AUTOMATED COUNT: 19.2 %
GFR SERPLBLD BASED ON 1.73 SQ M-ARVRAT: 37 ML/MIN/1.73M2 (ref 60–?)
GLOBULIN PLAS-MCNC: 3.3 G/DL (ref 2.8–4.4)
GLUCOSE BLD-MCNC: 119 MG/DL (ref 70–99)
HCT VFR BLD AUTO: 31.2 %
HGB BLD-MCNC: 9.2 G/DL
IMM GRANULOCYTES # BLD AUTO: 0.02 X10(3) UL (ref 0–1)
IMM GRANULOCYTES NFR BLD: 0.4 %
LYMPHOCYTES # BLD AUTO: 0.9 X10(3) UL (ref 1–4)
LYMPHOCYTES NFR BLD AUTO: 16.4 %
MCH RBC QN AUTO: 25.6 PG (ref 26–34)
MCHC RBC AUTO-ENTMCNC: 29.5 G/DL (ref 31–37)
MCV RBC AUTO: 86.7 FL
MONOCYTES # BLD AUTO: 0.5 X10(3) UL (ref 0.1–1)
MONOCYTES NFR BLD AUTO: 9.1 %
NEUTROPHILS # BLD AUTO: 3.64 X10 (3) UL (ref 1.5–7.7)
NEUTROPHILS # BLD AUTO: 3.64 X10(3) UL (ref 1.5–7.7)
NEUTROPHILS NFR BLD AUTO: 66.1 %
OSMOLALITY SERPL CALC.SUM OF ELEC: 300 MOSM/KG (ref 275–295)
PLATELET # BLD AUTO: 255 10(3)UL (ref 150–450)
POTASSIUM SERPL-SCNC: 4.4 MMOL/L (ref 3.5–5.1)
PROT SERPL-MCNC: 6 G/DL (ref 6.4–8.2)
RBC # BLD AUTO: 3.6 X10(6)UL
SODIUM SERPL-SCNC: 142 MMOL/L (ref 136–145)
WBC # BLD AUTO: 5.5 X10(3) UL (ref 4–11)

## 2022-12-31 PROCEDURE — 80053 COMPREHEN METABOLIC PANEL: CPT | Performed by: EMERGENCY MEDICINE

## 2022-12-31 PROCEDURE — 99284 EMERGENCY DEPT VISIT MOD MDM: CPT

## 2022-12-31 PROCEDURE — 36415 COLL VENOUS BLD VENIPUNCTURE: CPT

## 2022-12-31 PROCEDURE — 12011 RPR F/E/E/N/L/M 2.5 CM/<: CPT

## 2022-12-31 PROCEDURE — 90471 IMMUNIZATION ADMIN: CPT

## 2022-12-31 PROCEDURE — 70450 CT HEAD/BRAIN W/O DYE: CPT | Performed by: EMERGENCY MEDICINE

## 2022-12-31 PROCEDURE — 85025 COMPLETE CBC W/AUTO DIFF WBC: CPT | Performed by: EMERGENCY MEDICINE

## 2023-01-01 ENCOUNTER — APPOINTMENT (OUTPATIENT)
Dept: ULTRASOUND IMAGING | Facility: HOSPITAL | Age: 83
End: 2023-01-01
Attending: HOSPITALIST
Payer: MEDICARE

## 2023-01-01 ENCOUNTER — APPOINTMENT (OUTPATIENT)
Dept: CT IMAGING | Facility: HOSPITAL | Age: 83
End: 2023-01-01
Attending: HOSPITALIST
Payer: MEDICARE

## 2023-01-01 ENCOUNTER — HOSPITAL ENCOUNTER (INPATIENT)
Facility: HOSPITAL | Age: 83
LOS: 22 days | End: 2023-01-01
Attending: EMERGENCY MEDICINE | Admitting: HOSPITALIST
Payer: MEDICARE

## 2023-01-01 ENCOUNTER — PATIENT MESSAGE (OUTPATIENT)
Dept: FAMILY MEDICINE CLINIC | Facility: CLINIC | Age: 83
End: 2023-01-01

## 2023-01-01 ENCOUNTER — APPOINTMENT (OUTPATIENT)
Dept: GENERAL RADIOLOGY | Facility: HOSPITAL | Age: 83
End: 2023-01-01
Attending: INTERNAL MEDICINE
Payer: MEDICARE

## 2023-01-01 ENCOUNTER — APPOINTMENT (OUTPATIENT)
Dept: CV DIAGNOSTICS | Facility: HOSPITAL | Age: 83
End: 2023-01-01
Attending: HOSPITALIST
Payer: MEDICARE

## 2023-01-01 ENCOUNTER — APPOINTMENT (OUTPATIENT)
Dept: GENERAL RADIOLOGY | Facility: HOSPITAL | Age: 83
End: 2023-01-01
Attending: RADIOLOGY
Payer: MEDICARE

## 2023-01-01 ENCOUNTER — APPOINTMENT (OUTPATIENT)
Dept: GENERAL RADIOLOGY | Facility: HOSPITAL | Age: 83
End: 2023-01-01
Attending: PHYSICIAN ASSISTANT
Payer: MEDICARE

## 2023-01-01 ENCOUNTER — APPOINTMENT (OUTPATIENT)
Dept: GENERAL RADIOLOGY | Facility: HOSPITAL | Age: 83
End: 2023-01-01
Attending: HOSPITALIST
Payer: MEDICARE

## 2023-01-01 ENCOUNTER — APPOINTMENT (OUTPATIENT)
Dept: GENERAL RADIOLOGY | Facility: HOSPITAL | Age: 83
End: 2023-01-01
Attending: NURSE PRACTITIONER
Payer: MEDICARE

## 2023-01-01 ENCOUNTER — APPOINTMENT (OUTPATIENT)
Dept: CT IMAGING | Facility: HOSPITAL | Age: 83
End: 2023-01-01
Attending: EMERGENCY MEDICINE
Payer: MEDICARE

## 2023-01-01 ENCOUNTER — APPOINTMENT (OUTPATIENT)
Dept: ULTRASOUND IMAGING | Facility: HOSPITAL | Age: 83
End: 2023-01-01
Attending: INTERNAL MEDICINE
Payer: MEDICARE

## 2023-01-01 ENCOUNTER — APPOINTMENT (OUTPATIENT)
Dept: CT IMAGING | Facility: HOSPITAL | Age: 83
End: 2023-01-01
Attending: INTERNAL MEDICINE
Payer: MEDICARE

## 2023-01-01 ENCOUNTER — PATIENT OUTREACH (OUTPATIENT)
Dept: CASE MANAGEMENT | Age: 83
End: 2023-01-01

## 2023-01-01 ENCOUNTER — APPOINTMENT (OUTPATIENT)
Dept: MRI IMAGING | Facility: HOSPITAL | Age: 83
End: 2023-01-01
Attending: INTERNAL MEDICINE
Payer: MEDICARE

## 2023-01-01 VITALS
HEIGHT: 62 IN | DIASTOLIC BLOOD PRESSURE: 56 MMHG | BODY MASS INDEX: 37.69 KG/M2 | TEMPERATURE: 98 F | SYSTOLIC BLOOD PRESSURE: 104 MMHG | OXYGEN SATURATION: 100 % | WEIGHT: 204.81 LBS

## 2023-01-01 VITALS
BODY MASS INDEX: 34.15 KG/M2 | OXYGEN SATURATION: 95 % | HEART RATE: 65 BPM | WEIGHT: 200 LBS | HEIGHT: 64 IN | SYSTOLIC BLOOD PRESSURE: 149 MMHG | RESPIRATION RATE: 22 BRPM | DIASTOLIC BLOOD PRESSURE: 59 MMHG | TEMPERATURE: 98 F

## 2023-01-01 DIAGNOSIS — E03.9 HYPOTHYROIDISM, UNSPECIFIED TYPE: ICD-10-CM
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
## 2023-01-01 DIAGNOSIS — R77.8 ELEVATED TROPONIN: ICD-10-CM

## 2023-01-01 DIAGNOSIS — R53.1 WEAKNESS GENERALIZED: Primary | ICD-10-CM

## 2023-01-01 LAB
ALBUMIN SERPL-MCNC: 1.7 G/DL (ref 3.4–5)
ALBUMIN SERPL-MCNC: 1.9 G/DL (ref 3.4–5)
ALBUMIN SERPL-MCNC: 2 G/DL (ref 3.4–5)
ALBUMIN SERPL-MCNC: 2.2 G/DL (ref 3.4–5)
ALBUMIN SERPL-MCNC: 2.3 G/DL (ref 3.4–5)
ALBUMIN SERPL-MCNC: 2.4 G/DL (ref 3.4–5)
ALBUMIN/GLOB SERPL: 0.5 {RATIO} (ref 1–2)
ALBUMIN/GLOB SERPL: 0.5 {RATIO} (ref 1–2)
ALBUMIN/GLOB SERPL: 0.6 {RATIO} (ref 1–2)
ALBUMIN/GLOB SERPL: 0.7 {RATIO} (ref 1–2)
ALP LIVER SERPL-CCNC: 103 U/L
ALP LIVER SERPL-CCNC: 114 U/L
ALP LIVER SERPL-CCNC: 118 U/L
ALP LIVER SERPL-CCNC: 129 U/L
ALP LIVER SERPL-CCNC: 150 U/L
ALP LIVER SERPL-CCNC: 90 U/L
ALT SERPL-CCNC: 13 U/L
ALT SERPL-CCNC: 15 U/L
ALT SERPL-CCNC: 16 U/L
ALT SERPL-CCNC: 17 U/L
ALT SERPL-CCNC: 18 U/L
ALT SERPL-CCNC: 20 U/L
ANION GAP SERPL CALC-SCNC: 0 MMOL/L (ref 0–18)
ANION GAP SERPL CALC-SCNC: 2 MMOL/L (ref 0–18)
ANION GAP SERPL CALC-SCNC: 3 MMOL/L (ref 0–18)
ANION GAP SERPL CALC-SCNC: 5 MMOL/L (ref 0–18)
ANION GAP SERPL CALC-SCNC: 6 MMOL/L (ref 0–18)
ANION GAP SERPL CALC-SCNC: 7 MMOL/L (ref 0–18)
ANION GAP SERPL CALC-SCNC: 7 MMOL/L (ref 0–18)
ANION GAP SERPL CALC-SCNC: <0 MMOL/L (ref 0–18)
ANION GAP SERPL CALC-SCNC: <0 MMOL/L (ref 0–18)
APTT PPP: 32.1 SECONDS (ref 23.3–35.6)
ARTERIAL PATENCY WRIST A: POSITIVE
AST SERPL-CCNC: 16 U/L (ref 15–37)
AST SERPL-CCNC: 18 U/L (ref 15–37)
AST SERPL-CCNC: 22 U/L (ref 15–37)
AST SERPL-CCNC: 24 U/L (ref 15–37)
AST SERPL-CCNC: 26 U/L (ref 15–37)
AST SERPL-CCNC: 9 U/L (ref 15–37)
ATRIAL RATE: 39 BPM
ATRIAL RATE: 49 BPM
ATRIAL RATE: 84 BPM
ATRIAL RATE: 93 BPM
BASE EXCESS BLDA CALC-SCNC: 10.4 MMOL/L (ref ?–2)
BASE EXCESS BLDA CALC-SCNC: 10.5 MMOL/L (ref ?–2)
BASE EXCESS BLDA CALC-SCNC: 13.3 MMOL/L (ref ?–2)
BASE EXCESS BLDA CALC-SCNC: 16.3 MMOL/L (ref ?–2)
BASE EXCESS BLDA CALC-SCNC: 17.3 MMOL/L (ref ?–2)
BASE EXCESS BLDA CALC-SCNC: 17.9 MMOL/L (ref ?–2)
BASE EXCESS BLDA CALC-SCNC: 18.2 MMOL/L (ref ?–2)
BASE EXCESS BLDA CALC-SCNC: 2.8 MMOL/L (ref ?–2)
BASE EXCESS BLDA CALC-SCNC: 5 MMOL/L (ref ?–2)
BASOPHILS # BLD AUTO: 0.01 X10(3) UL (ref 0–0.2)
BASOPHILS # BLD AUTO: 0.02 X10(3) UL (ref 0–0.2)
BASOPHILS # BLD AUTO: 0.03 X10(3) UL (ref 0–0.2)
BASOPHILS # BLD AUTO: 0.04 X10(3) UL (ref 0–0.2)
BASOPHILS NFR BLD AUTO: 0.1 %
BASOPHILS NFR BLD AUTO: 0.3 %
BASOPHILS NFR BLD AUTO: 0.3 %
BASOPHILS NFR BLD AUTO: 0.4 %
BASOPHILS NFR BLD AUTO: 0.5 %
BASOPHILS NFR BLD AUTO: 0.5 %
BASOPHILS NFR BLD AUTO: 0.6 %
BASOPHILS NFR BLD AUTO: 0.6 %
BASOPHILS NFR PLR: 0 %
BILIRUB SERPL-MCNC: 0.2 MG/DL (ref 0.1–2)
BILIRUB SERPL-MCNC: 0.3 MG/DL (ref 0.1–2)
BILIRUB SERPL-MCNC: 0.4 MG/DL (ref 0.1–2)
BILIRUB SERPL-MCNC: 0.5 MG/DL (ref 0.1–2)
BILIRUB UR QL STRIP.AUTO: NEGATIVE
BODY TEMPERATURE: 96.7 F
BODY TEMPERATURE: 98.6 F
BUN BLD-MCNC: 16 MG/DL (ref 7–18)
BUN BLD-MCNC: 16 MG/DL (ref 7–18)
BUN BLD-MCNC: 18 MG/DL (ref 7–18)
BUN BLD-MCNC: 19 MG/DL (ref 7–18)
BUN BLD-MCNC: 19 MG/DL (ref 7–18)
BUN BLD-MCNC: 20 MG/DL (ref 7–18)
BUN BLD-MCNC: 20 MG/DL (ref 7–18)
BUN BLD-MCNC: 21 MG/DL (ref 7–18)
BUN BLD-MCNC: 23 MG/DL (ref 7–18)
BUN BLD-MCNC: 23 MG/DL (ref 7–18)
BUN BLD-MCNC: 24 MG/DL (ref 7–18)
BUN BLD-MCNC: 27 MG/DL (ref 7–18)
BUN BLD-MCNC: 29 MG/DL (ref 7–18)
BUN BLD-MCNC: 30 MG/DL (ref 7–18)
BUN BLD-MCNC: 33 MG/DL (ref 7–18)
C DIFF TOX B STL QL: NEGATIVE
CA-I BLD-SCNC: 1 MMOL/L (ref 0.95–1.32)
CA-I BLD-SCNC: 1.01 MMOL/L (ref 0.95–1.32)
CA-I BLD-SCNC: 1.01 MMOL/L (ref 0.95–1.32)
CA-I BLD-SCNC: 1.02 MMOL/L (ref 0.95–1.32)
CA-I BLD-SCNC: 1.05 MMOL/L (ref 0.95–1.32)
CA-I BLD-SCNC: 1.07 MMOL/L (ref 0.95–1.32)
CA-I BLD-SCNC: 1.16 MMOL/L (ref 0.95–1.32)
CA-I BLD-SCNC: 1.2 MMOL/L (ref 0.95–1.32)
CALCIUM BLD-MCNC: 6.4 MG/DL (ref 8.5–10.1)
CALCIUM BLD-MCNC: 6.8 MG/DL (ref 8.5–10.1)
CALCIUM BLD-MCNC: 7 MG/DL (ref 8.5–10.1)
CALCIUM BLD-MCNC: 7.1 MG/DL (ref 8.5–10.1)
CALCIUM BLD-MCNC: 7.1 MG/DL (ref 8.5–10.1)
CALCIUM BLD-MCNC: 7.4 MG/DL (ref 8.5–10.1)
CALCIUM BLD-MCNC: 7.4 MG/DL (ref 8.5–10.1)
CALCIUM BLD-MCNC: 7.6 MG/DL (ref 8.5–10.1)
CALCIUM BLD-MCNC: 7.7 MG/DL (ref 8.5–10.1)
CALCIUM BLD-MCNC: 7.7 MG/DL (ref 8.5–10.1)
CALCIUM BLD-MCNC: 7.8 MG/DL (ref 8.5–10.1)
CALCIUM BLD-MCNC: 7.8 MG/DL (ref 8.5–10.1)
CALCIUM BLD-MCNC: 7.9 MG/DL (ref 8.5–10.1)
CALCIUM BLD-MCNC: 8 MG/DL (ref 8.5–10.1)
CALCIUM BLD-MCNC: 8 MG/DL (ref 8.5–10.1)
CALCIUM BLD-MCNC: 8.4 MG/DL (ref 8.5–10.1)
CALCIUM BLD-MCNC: 8.6 MG/DL (ref 8.5–10.1)
CALCIUM BLD-MCNC: 8.9 MG/DL (ref 8.5–10.1)
CHLORIDE SERPL-SCNC: 100 MMOL/L (ref 98–112)
CHLORIDE SERPL-SCNC: 101 MMOL/L (ref 98–112)
CHLORIDE SERPL-SCNC: 102 MMOL/L (ref 98–112)
CHLORIDE SERPL-SCNC: 103 MMOL/L (ref 98–112)
CHLORIDE SERPL-SCNC: 104 MMOL/L (ref 98–112)
CHLORIDE SERPL-SCNC: 105 MMOL/L (ref 98–112)
CHLORIDE SERPL-SCNC: 107 MMOL/L (ref 98–112)
CHLORIDE SERPL-SCNC: 111 MMOL/L (ref 98–112)
CHLORIDE SERPL-SCNC: 111 MMOL/L (ref 98–112)
CHLORIDE SERPL-SCNC: 114 MMOL/L (ref 98–112)
CHOLEST SERPL-MCNC: 90 MG/DL (ref ?–200)
CLARITY UR REFRACT.AUTO: CLEAR
CO2 SERPL-SCNC: 27 MMOL/L (ref 21–32)
CO2 SERPL-SCNC: 28 MMOL/L (ref 21–32)
CO2 SERPL-SCNC: 29 MMOL/L (ref 21–32)
CO2 SERPL-SCNC: 30 MMOL/L (ref 21–32)
CO2 SERPL-SCNC: 31 MMOL/L (ref 21–32)
CO2 SERPL-SCNC: 32 MMOL/L (ref 21–32)
CO2 SERPL-SCNC: 34 MMOL/L (ref 21–32)
CO2 SERPL-SCNC: 35 MMOL/L (ref 21–32)
CO2 SERPL-SCNC: 35 MMOL/L (ref 21–32)
CO2 SERPL-SCNC: 37 MMOL/L (ref 21–32)
CO2 SERPL-SCNC: 37 MMOL/L (ref 21–32)
CO2 SERPL-SCNC: 39 MMOL/L (ref 21–32)
COHGB MFR BLD: 0 % SAT (ref 0–3)
COHGB MFR BLD: 2 % SAT (ref 0–3)
COHGB MFR BLD: 2.3 % SAT (ref 0–3)
COHGB MFR BLD: 2.4 % SAT (ref 0–3)
COLOR FLD: YELLOW
COLOR UR AUTO: YELLOW
CREAT BLD-MCNC: 0.91 MG/DL
CREAT BLD-MCNC: 0.94 MG/DL
CREAT BLD-MCNC: 0.99 MG/DL
CREAT BLD-MCNC: 1 MG/DL
CREAT BLD-MCNC: 1.02 MG/DL
CREAT BLD-MCNC: 1.03 MG/DL
CREAT BLD-MCNC: 1.06 MG/DL
CREAT BLD-MCNC: 1.07 MG/DL
CREAT BLD-MCNC: 1.08 MG/DL
CREAT BLD-MCNC: 1.09 MG/DL
CREAT BLD-MCNC: 1.1 MG/DL
CREAT BLD-MCNC: 1.11 MG/DL
CREAT BLD-MCNC: 1.11 MG/DL
CREAT BLD-MCNC: 1.19 MG/DL
CREAT BLD-MCNC: 1.19 MG/DL
CREAT BLD-MCNC: 1.22 MG/DL
CREAT BLD-MCNC: 1.22 MG/DL
CREAT BLD-MCNC: 1.24 MG/DL
CREAT BLD-MCNC: 1.25 MG/DL
CREAT BLD-MCNC: 1.26 MG/DL
CREAT BLD-MCNC: 1.29 MG/DL
CREAT BLD-MCNC: 1.32 MG/DL
D DIMER PPP FEU-MCNC: 1.19 UG/ML FEU (ref ?–0.82)
EGFRCR SERPLBLD CKD-EPI 2021: 40 ML/MIN/1.73M2 (ref 60–?)
EGFRCR SERPLBLD CKD-EPI 2021: 41 ML/MIN/1.73M2 (ref 60–?)
EGFRCR SERPLBLD CKD-EPI 2021: 43 ML/MIN/1.73M2 (ref 60–?)
EGFRCR SERPLBLD CKD-EPI 2021: 44 ML/MIN/1.73M2 (ref 60–?)
EGFRCR SERPLBLD CKD-EPI 2021: 44 ML/MIN/1.73M2 (ref 60–?)
EGFRCR SERPLBLD CKD-EPI 2021: 46 ML/MIN/1.73M2 (ref 60–?)
EGFRCR SERPLBLD CKD-EPI 2021: 46 ML/MIN/1.73M2 (ref 60–?)
EGFRCR SERPLBLD CKD-EPI 2021: 50 ML/MIN/1.73M2 (ref 60–?)
EGFRCR SERPLBLD CKD-EPI 2021: 51 ML/MIN/1.73M2 (ref 60–?)
EGFRCR SERPLBLD CKD-EPI 2021: 51 ML/MIN/1.73M2 (ref 60–?)
EGFRCR SERPLBLD CKD-EPI 2021: 52 ML/MIN/1.73M2 (ref 60–?)
EGFRCR SERPLBLD CKD-EPI 2021: 52 ML/MIN/1.73M2 (ref 60–?)
EGFRCR SERPLBLD CKD-EPI 2021: 54 ML/MIN/1.73M2 (ref 60–?)
EGFRCR SERPLBLD CKD-EPI 2021: 55 ML/MIN/1.73M2 (ref 60–?)
EGFRCR SERPLBLD CKD-EPI 2021: 56 ML/MIN/1.73M2 (ref 60–?)
EGFRCR SERPLBLD CKD-EPI 2021: 57 ML/MIN/1.73M2 (ref 60–?)
EGFRCR SERPLBLD CKD-EPI 2021: 61 ML/MIN/1.73M2 (ref 60–?)
EGFRCR SERPLBLD CKD-EPI 2021: 63 ML/MIN/1.73M2 (ref 60–?)
EOSINOPHIL # BLD AUTO: 0.04 X10(3) UL (ref 0–0.7)
EOSINOPHIL # BLD AUTO: 0.08 X10(3) UL (ref 0–0.7)
EOSINOPHIL # BLD AUTO: 0.09 X10(3) UL (ref 0–0.7)
EOSINOPHIL # BLD AUTO: 0.1 X10(3) UL (ref 0–0.7)
EOSINOPHIL # BLD AUTO: 0.12 X10(3) UL (ref 0–0.7)
EOSINOPHIL # BLD AUTO: 0.17 X10(3) UL (ref 0–0.7)
EOSINOPHIL # BLD AUTO: 0.19 X10(3) UL (ref 0–0.7)
EOSINOPHIL # BLD AUTO: 0.2 X10(3) UL (ref 0–0.7)
EOSINOPHIL # BLD AUTO: 0.26 X10(3) UL (ref 0–0.7)
EOSINOPHIL # BLD AUTO: 0.27 X10(3) UL (ref 0–0.7)
EOSINOPHIL NFR BLD AUTO: 0.5 %
EOSINOPHIL NFR BLD AUTO: 0.9 %
EOSINOPHIL NFR BLD AUTO: 1.2 %
EOSINOPHIL NFR BLD AUTO: 1.3 %
EOSINOPHIL NFR BLD AUTO: 2.5 %
EOSINOPHIL NFR BLD AUTO: 3.3 %
EOSINOPHIL NFR BLD AUTO: 3.5 %
EOSINOPHIL NFR BLD AUTO: 3.9 %
EOSINOPHIL NFR BLD AUTO: 4.2 %
EOSINOPHIL NFR BLD AUTO: 4.8 %
EOSINOPHIL NFR PLR: 0 %
ERYTHROCYTE [DISTWIDTH] IN BLOOD BY AUTOMATED COUNT: 16.4 %
ERYTHROCYTE [DISTWIDTH] IN BLOOD BY AUTOMATED COUNT: 16.6 %
ERYTHROCYTE [DISTWIDTH] IN BLOOD BY AUTOMATED COUNT: 16.7 %
ERYTHROCYTE [DISTWIDTH] IN BLOOD BY AUTOMATED COUNT: 16.8 %
ERYTHROCYTE [DISTWIDTH] IN BLOOD BY AUTOMATED COUNT: 16.9 %
ERYTHROCYTE [DISTWIDTH] IN BLOOD BY AUTOMATED COUNT: 17.1 %
ERYTHROCYTE [DISTWIDTH] IN BLOOD BY AUTOMATED COUNT: 17.2 %
ERYTHROCYTE [DISTWIDTH] IN BLOOD BY AUTOMATED COUNT: 17.2 %
ERYTHROCYTE [DISTWIDTH] IN BLOOD BY AUTOMATED COUNT: 17.3 %
ERYTHROCYTE [DISTWIDTH] IN BLOOD BY AUTOMATED COUNT: 17.3 %
ERYTHROCYTE [DISTWIDTH] IN BLOOD BY AUTOMATED COUNT: 17.5 %
ERYTHROCYTE [DISTWIDTH] IN BLOOD BY AUTOMATED COUNT: 17.6 %
ERYTHROCYTE [DISTWIDTH] IN BLOOD BY AUTOMATED COUNT: 17.9 %
ERYTHROCYTE [DISTWIDTH] IN BLOOD BY AUTOMATED COUNT: 18 %
EST. AVERAGE GLUCOSE BLD GHB EST-MCNC: 120 MG/DL (ref 68–126)
FIO2: 100 %
FIO2: 30 %
FIO2: 30 %
FIO2: 40 %
FIO2: 40 %
FIO2: 50 %
GLOBULIN PLAS-MCNC: 3.1 G/DL (ref 2.8–4.4)
GLOBULIN PLAS-MCNC: 3.3 G/DL (ref 2.8–4.4)
GLOBULIN PLAS-MCNC: 3.3 G/DL (ref 2.8–4.4)
GLOBULIN PLAS-MCNC: 3.8 G/DL (ref 2.8–4.4)
GLOBULIN PLAS-MCNC: 4 G/DL (ref 2.8–4.4)
GLOBULIN PLAS-MCNC: 4 G/DL (ref 2.8–4.4)
GLUCOSE BLD-MCNC: 104 MG/DL (ref 70–99)
GLUCOSE BLD-MCNC: 106 MG/DL (ref 70–99)
GLUCOSE BLD-MCNC: 107 MG/DL (ref 70–99)
GLUCOSE BLD-MCNC: 107 MG/DL (ref 70–99)
GLUCOSE BLD-MCNC: 110 MG/DL (ref 70–99)
GLUCOSE BLD-MCNC: 111 MG/DL (ref 70–99)
GLUCOSE BLD-MCNC: 116 MG/DL (ref 70–99)
GLUCOSE BLD-MCNC: 118 MG/DL (ref 70–99)
GLUCOSE BLD-MCNC: 123 MG/DL (ref 70–99)
GLUCOSE BLD-MCNC: 125 MG/DL (ref 70–99)
GLUCOSE BLD-MCNC: 128 MG/DL (ref 70–99)
GLUCOSE BLD-MCNC: 128 MG/DL (ref 70–99)
GLUCOSE BLD-MCNC: 130 MG/DL (ref 70–99)
GLUCOSE BLD-MCNC: 131 MG/DL (ref 70–99)
GLUCOSE BLD-MCNC: 131 MG/DL (ref 70–99)
GLUCOSE BLD-MCNC: 133 MG/DL (ref 70–99)
GLUCOSE BLD-MCNC: 136 MG/DL (ref 70–99)
GLUCOSE BLD-MCNC: 136 MG/DL (ref 70–99)
GLUCOSE BLD-MCNC: 137 MG/DL (ref 70–99)
GLUCOSE BLD-MCNC: 140 MG/DL (ref 70–99)
GLUCOSE BLD-MCNC: 142 MG/DL (ref 70–99)
GLUCOSE BLD-MCNC: 143 MG/DL (ref 70–99)
GLUCOSE BLD-MCNC: 144 MG/DL (ref 70–99)
GLUCOSE BLD-MCNC: 145 MG/DL (ref 70–99)
GLUCOSE BLD-MCNC: 146 MG/DL (ref 70–99)
GLUCOSE BLD-MCNC: 147 MG/DL (ref 70–99)
GLUCOSE BLD-MCNC: 148 MG/DL (ref 70–99)
GLUCOSE BLD-MCNC: 149 MG/DL (ref 70–99)
GLUCOSE BLD-MCNC: 151 MG/DL (ref 70–99)
GLUCOSE BLD-MCNC: 151 MG/DL (ref 70–99)
GLUCOSE BLD-MCNC: 153 MG/DL (ref 70–99)
GLUCOSE BLD-MCNC: 154 MG/DL (ref 70–99)
GLUCOSE BLD-MCNC: 162 MG/DL (ref 70–99)
GLUCOSE BLD-MCNC: 172 MG/DL (ref 70–99)
GLUCOSE BLD-MCNC: 173 MG/DL (ref 70–99)
GLUCOSE BLD-MCNC: 173 MG/DL (ref 70–99)
GLUCOSE BLD-MCNC: 181 MG/DL (ref 70–99)
GLUCOSE BLD-MCNC: 183 MG/DL (ref 70–99)
GLUCOSE BLD-MCNC: 185 MG/DL (ref 70–99)
GLUCOSE BLD-MCNC: 186 MG/DL (ref 70–99)
GLUCOSE BLD-MCNC: 188 MG/DL (ref 70–99)
GLUCOSE BLD-MCNC: 193 MG/DL (ref 70–99)
GLUCOSE BLD-MCNC: 196 MG/DL (ref 70–99)
GLUCOSE BLD-MCNC: 197 MG/DL (ref 70–99)
GLUCOSE BLD-MCNC: 199 MG/DL (ref 70–99)
GLUCOSE BLD-MCNC: 200 MG/DL (ref 70–99)
GLUCOSE BLD-MCNC: 201 MG/DL (ref 70–99)
GLUCOSE BLD-MCNC: 201 MG/DL (ref 70–99)
GLUCOSE BLD-MCNC: 204 MG/DL (ref 70–99)
GLUCOSE BLD-MCNC: 204 MG/DL (ref 70–99)
GLUCOSE BLD-MCNC: 206 MG/DL (ref 70–99)
GLUCOSE BLD-MCNC: 213 MG/DL (ref 70–99)
GLUCOSE BLD-MCNC: 213 MG/DL (ref 70–99)
GLUCOSE BLD-MCNC: 216 MG/DL (ref 70–99)
GLUCOSE BLD-MCNC: 218 MG/DL (ref 70–99)
GLUCOSE BLD-MCNC: 219 MG/DL (ref 70–99)
GLUCOSE BLD-MCNC: 222 MG/DL (ref 70–99)
GLUCOSE BLD-MCNC: 223 MG/DL (ref 70–99)
GLUCOSE BLD-MCNC: 224 MG/DL (ref 70–99)
GLUCOSE BLD-MCNC: 225 MG/DL (ref 70–99)
GLUCOSE BLD-MCNC: 229 MG/DL (ref 70–99)
GLUCOSE BLD-MCNC: 230 MG/DL (ref 70–99)
GLUCOSE BLD-MCNC: 234 MG/DL (ref 70–99)
GLUCOSE BLD-MCNC: 239 MG/DL (ref 70–99)
GLUCOSE BLD-MCNC: 249 MG/DL (ref 70–99)
GLUCOSE BLD-MCNC: 252 MG/DL (ref 70–99)
GLUCOSE BLD-MCNC: 271 MG/DL (ref 70–99)
GLUCOSE BLD-MCNC: 66 MG/DL (ref 70–99)
GLUCOSE BLD-MCNC: 75 MG/DL (ref 70–99)
GLUCOSE BLD-MCNC: 76 MG/DL (ref 70–99)
GLUCOSE BLD-MCNC: 78 MG/DL (ref 70–99)
GLUCOSE BLD-MCNC: 78 MG/DL (ref 70–99)
GLUCOSE BLD-MCNC: 83 MG/DL (ref 70–99)
GLUCOSE BLD-MCNC: 90 MG/DL (ref 70–99)
GLUCOSE BLD-MCNC: 90 MG/DL (ref 70–99)
GLUCOSE BLD-MCNC: 91 MG/DL (ref 70–99)
GLUCOSE BLD-MCNC: 93 MG/DL (ref 70–99)
GLUCOSE BLD-MCNC: 94 MG/DL (ref 70–99)
GLUCOSE BLD-MCNC: 96 MG/DL (ref 70–99)
GLUCOSE BLD-MCNC: 98 MG/DL (ref 70–99)
GLUCOSE PLR-MCNC: 139 MG/DL
GLUCOSE UR STRIP.AUTO-MCNC: NORMAL MG/DL
HBA1C MFR BLD: 5.8 % (ref ?–5.7)
HCO3 BLDA-SCNC: 27 MEQ/L (ref 21–27)
HCO3 BLDA-SCNC: 28.8 MEQ/L (ref 21–27)
HCO3 BLDA-SCNC: 33 MEQ/L (ref 21–27)
HCO3 BLDA-SCNC: 33 MEQ/L (ref 21–27)
HCO3 BLDA-SCNC: 35.2 MEQ/L (ref 21–27)
HCO3 BLDA-SCNC: 37.6 MEQ/L (ref 21–27)
HCO3 BLDA-SCNC: 38.4 MEQ/L (ref 21–27)
HCO3 BLDA-SCNC: 38.9 MEQ/L (ref 21–27)
HCO3 BLDA-SCNC: 39 MEQ/L (ref 21–27)
HCT VFR BLD AUTO: 24.2 %
HCT VFR BLD AUTO: 25 %
HCT VFR BLD AUTO: 25 %
HCT VFR BLD AUTO: 25.7 %
HCT VFR BLD AUTO: 26 %
HCT VFR BLD AUTO: 26.8 %
HCT VFR BLD AUTO: 26.9 %
HCT VFR BLD AUTO: 26.9 %
HCT VFR BLD AUTO: 27.3 %
HCT VFR BLD AUTO: 28.6 %
HCT VFR BLD AUTO: 28.7 %
HCT VFR BLD AUTO: 29.2 %
HCT VFR BLD AUTO: 30.1 %
HCT VFR BLD AUTO: 30.3 %
HCT VFR BLD AUTO: 31.7 %
HCT VFR BLD AUTO: 31.7 %
HCT VFR BLD AUTO: 32.4 %
HCT VFR BLD AUTO: 34.6 %
HDLC SERPL-MCNC: 72 MG/DL (ref 40–59)
HGB BLD-MCNC: 6.6 G/DL
HGB BLD-MCNC: 7.2 G/DL
HGB BLD-MCNC: 7.3 G/DL
HGB BLD-MCNC: 7.5 G/DL
HGB BLD-MCNC: 7.6 G/DL
HGB BLD-MCNC: 7.7 G/DL
HGB BLD-MCNC: 7.9 G/DL
HGB BLD-MCNC: 8 G/DL
HGB BLD-MCNC: 8.1 G/DL
HGB BLD-MCNC: 8.3 G/DL
HGB BLD-MCNC: 8.3 G/DL
HGB BLD-MCNC: 8.4 G/DL
HGB BLD-MCNC: 8.6 G/DL
HGB BLD-MCNC: 8.7 G/DL
HGB BLD-MCNC: 8.8 G/DL
HGB BLD-MCNC: 8.8 G/DL
HGB BLD-MCNC: 8.9 G/DL
HGB BLD-MCNC: 8.9 G/DL
HGB BLD-MCNC: 9.4 G/DL
HGB BLD-MCNC: 9.4 G/DL
HGB BLD-MCNC: 9.9 G/DL
HYALINE CASTS #/AREA URNS AUTO: PRESENT /LPF
IMM GRANULOCYTES # BLD AUTO: 0.01 X10(3) UL (ref 0–1)
IMM GRANULOCYTES # BLD AUTO: 0.02 X10(3) UL (ref 0–1)
IMM GRANULOCYTES # BLD AUTO: 0.04 X10(3) UL (ref 0–1)
IMM GRANULOCYTES # BLD AUTO: 0.04 X10(3) UL (ref 0–1)
IMM GRANULOCYTES # BLD AUTO: 0.05 X10(3) UL (ref 0–1)
IMM GRANULOCYTES # BLD AUTO: 0.06 X10(3) UL (ref 0–1)
IMM GRANULOCYTES # BLD AUTO: 0.06 X10(3) UL (ref 0–1)
IMM GRANULOCYTES NFR BLD: 0.2 %
IMM GRANULOCYTES NFR BLD: 0.3 %
IMM GRANULOCYTES NFR BLD: 0.4 %
IMM GRANULOCYTES NFR BLD: 0.4 %
IMM GRANULOCYTES NFR BLD: 0.5 %
IMM GRANULOCYTES NFR BLD: 0.6 %
IMM GRANULOCYTES NFR BLD: 0.7 %
IMM GRANULOCYTES NFR BLD: 0.8 %
INR BLD: 0.97 (ref 0.85–1.16)
INR BLD: 1.09 (ref 0.85–1.16)
INSPIRATION SETTING TIME VENT: 0.9 %
KETONES UR STRIP.AUTO-MCNC: NEGATIVE MG/DL
LACTATE BLD-SCNC: 0.4 MMOL/L (ref 0.5–2)
LACTATE BLD-SCNC: 0.4 MMOL/L (ref 0.5–2)
LACTATE BLD-SCNC: 0.5 MMOL/L (ref 0.5–2)
LACTATE BLD-SCNC: 0.7 MMOL/L (ref 0.5–2)
LACTATE SERPL-SCNC: 0.7 MMOL/L (ref 0.4–2)
LDH FLD L TO P-CCNC: 73 U/L
LDLC SERPL CALC-MCNC: 3 MG/DL (ref ?–100)
LEUKOCYTE ESTERASE UR QL STRIP.AUTO: 250
LYMPHOCYTES # BLD AUTO: 0.24 X10(3) UL (ref 1–4)
LYMPHOCYTES # BLD AUTO: 0.31 X10(3) UL (ref 1–4)
LYMPHOCYTES # BLD AUTO: 0.37 X10(3) UL (ref 1–4)
LYMPHOCYTES # BLD AUTO: 0.39 X10(3) UL (ref 1–4)
LYMPHOCYTES # BLD AUTO: 0.48 X10(3) UL (ref 1–4)
LYMPHOCYTES # BLD AUTO: 0.49 X10(3) UL (ref 1–4)
LYMPHOCYTES # BLD AUTO: 0.5 X10(3) UL (ref 1–4)
LYMPHOCYTES # BLD AUTO: 0.5 X10(3) UL (ref 1–4)
LYMPHOCYTES # BLD AUTO: 0.59 X10(3) UL (ref 1–4)
LYMPHOCYTES # BLD AUTO: 0.81 X10(3) UL (ref 1–4)
LYMPHOCYTES NFR BLD AUTO: 10.4 %
LYMPHOCYTES NFR BLD AUTO: 3.8 %
LYMPHOCYTES NFR BLD AUTO: 5.3 %
LYMPHOCYTES NFR BLD AUTO: 6.5 %
LYMPHOCYTES NFR BLD AUTO: 7.2 %
LYMPHOCYTES NFR BLD AUTO: 7.3 %
LYMPHOCYTES NFR BLD AUTO: 7.6 %
LYMPHOCYTES NFR BLD AUTO: 7.8 %
LYMPHOCYTES NFR BLD AUTO: 7.9 %
LYMPHOCYTES NFR BLD AUTO: 9.7 %
LYMPHOCYTES NFR PLR: 19 %
MAGNESIUM SERPL-MCNC: 1.4 MG/DL (ref 1.6–2.6)
MAGNESIUM SERPL-MCNC: 1.5 MG/DL (ref 1.6–2.6)
MAGNESIUM SERPL-MCNC: 1.6 MG/DL (ref 1.6–2.6)
MAGNESIUM SERPL-MCNC: 1.6 MG/DL (ref 1.6–2.6)
MAGNESIUM SERPL-MCNC: 1.7 MG/DL (ref 1.6–2.6)
MAGNESIUM SERPL-MCNC: 1.7 MG/DL (ref 1.6–2.6)
MAGNESIUM SERPL-MCNC: 1.8 MG/DL (ref 1.6–2.6)
MAGNESIUM SERPL-MCNC: 1.8 MG/DL (ref 1.6–2.6)
MAGNESIUM SERPL-MCNC: 1.9 MG/DL (ref 1.6–2.6)
MAGNESIUM SERPL-MCNC: 2 MG/DL (ref 1.6–2.6)
MAGNESIUM SERPL-MCNC: 2 MG/DL (ref 1.6–2.6)
MAGNESIUM SERPL-MCNC: 2.1 MG/DL (ref 1.6–2.6)
MCH RBC QN AUTO: 26.2 PG (ref 26–34)
MCH RBC QN AUTO: 26.6 PG (ref 26–34)
MCH RBC QN AUTO: 26.6 PG (ref 26–34)
MCH RBC QN AUTO: 26.7 PG (ref 26–34)
MCH RBC QN AUTO: 26.8 PG (ref 26–34)
MCH RBC QN AUTO: 26.9 PG (ref 26–34)
MCH RBC QN AUTO: 27 PG (ref 26–34)
MCH RBC QN AUTO: 27.1 PG (ref 26–34)
MCHC RBC AUTO-ENTMCNC: 27.4 G/DL (ref 31–37)
MCHC RBC AUTO-ENTMCNC: 27.8 G/DL (ref 31–37)
MCHC RBC AUTO-ENTMCNC: 28.4 G/DL (ref 31–37)
MCHC RBC AUTO-ENTMCNC: 28.6 G/DL (ref 31–37)
MCHC RBC AUTO-ENTMCNC: 28.6 G/DL (ref 31–37)
MCHC RBC AUTO-ENTMCNC: 28.8 G/DL (ref 31–37)
MCHC RBC AUTO-ENTMCNC: 28.9 G/DL (ref 31–37)
MCHC RBC AUTO-ENTMCNC: 29 G/DL (ref 31–37)
MCHC RBC AUTO-ENTMCNC: 29.4 G/DL (ref 31–37)
MCHC RBC AUTO-ENTMCNC: 29.7 G/DL (ref 31–37)
MCHC RBC AUTO-ENTMCNC: 30 G/DL (ref 31–37)
MCHC RBC AUTO-ENTMCNC: 30.2 G/DL (ref 31–37)
MCHC RBC AUTO-ENTMCNC: 30.2 G/DL (ref 31–37)
MCHC RBC AUTO-ENTMCNC: 30.8 G/DL (ref 31–37)
MCHC RBC AUTO-ENTMCNC: 30.9 G/DL (ref 31–37)
MCHC RBC AUTO-ENTMCNC: 31.1 G/DL (ref 31–37)
MCV RBC AUTO: 87.1 FL
MCV RBC AUTO: 87.2 FL
MCV RBC AUTO: 87.6 FL
MCV RBC AUTO: 87.7 FL
MCV RBC AUTO: 87.7 FL
MCV RBC AUTO: 88.3 FL
MCV RBC AUTO: 88.7 FL
MCV RBC AUTO: 89.3 FL
MCV RBC AUTO: 90.1 FL
MCV RBC AUTO: 90.3 FL
MCV RBC AUTO: 90.9 FL
MCV RBC AUTO: 93.2 FL
MCV RBC AUTO: 93.2 FL
MCV RBC AUTO: 93.3 FL
MCV RBC AUTO: 93.8 FL
MCV RBC AUTO: 94.4 FL
MCV RBC AUTO: 96.6 FL
MCV RBC AUTO: 97.2 FL
METHGB MFR BLD: 0.2 % SAT (ref 0.4–1.5)
METHGB MFR BLD: 0.3 % SAT (ref 0.4–1.5)
METHGB MFR BLD: 0.4 % SAT (ref 0.4–1.5)
METHGB MFR BLD: 0.5 % SAT (ref 0.4–1.5)
METHGB MFR BLD: 0.7 % SAT (ref 0.4–1.5)
METHGB MFR BLD: 1.4 % SAT (ref 0.4–1.5)
METHGB MFR BLD: 1.5 % SAT (ref 0.4–1.5)
METHGB MFR BLD: 2.3 % SAT (ref 0.4–1.5)
METHGB MFR BLD: 2.4 % SAT (ref 0.4–1.5)
MONOCYTES # BLD AUTO: 0.37 X10(3) UL (ref 0.1–1)
MONOCYTES # BLD AUTO: 0.42 X10(3) UL (ref 0.1–1)
MONOCYTES # BLD AUTO: 0.42 X10(3) UL (ref 0.1–1)
MONOCYTES # BLD AUTO: 0.44 X10(3) UL (ref 0.1–1)
MONOCYTES # BLD AUTO: 0.45 X10(3) UL (ref 0.1–1)
MONOCYTES # BLD AUTO: 0.46 X10(3) UL (ref 0.1–1)
MONOCYTES # BLD AUTO: 0.47 X10(3) UL (ref 0.1–1)
MONOCYTES # BLD AUTO: 0.59 X10(3) UL (ref 0.1–1)
MONOCYTES # BLD AUTO: 0.74 X10(3) UL (ref 0.1–1)
MONOCYTES # BLD AUTO: 0.86 X10(3) UL (ref 0.1–1)
MONOCYTES NFR BLD AUTO: 10.5 %
MONOCYTES NFR BLD AUTO: 6.3 %
MONOCYTES NFR BLD AUTO: 6.7 %
MONOCYTES NFR BLD AUTO: 7.1 %
MONOCYTES NFR BLD AUTO: 7.1 %
MONOCYTES NFR BLD AUTO: 8 %
MONOCYTES NFR BLD AUTO: 8.6 %
MONOCYTES NFR BLD AUTO: 8.6 %
MONOCYTES NFR BLD AUTO: 8.9 %
MONOCYTES NFR BLD AUTO: 9.4 %
MONOS+MACROS NFR PLR: 78 %
MRSA DNA SPEC QL NAA+PROBE: NEGATIVE
NEUTROPHILS # BLD AUTO: 3.01 X10 (3) UL (ref 1.5–7.7)
NEUTROPHILS # BLD AUTO: 3.01 X10(3) UL (ref 1.5–7.7)
NEUTROPHILS # BLD AUTO: 3.55 X10 (3) UL (ref 1.5–7.7)
NEUTROPHILS # BLD AUTO: 3.55 X10(3) UL (ref 1.5–7.7)
NEUTROPHILS # BLD AUTO: 3.95 X10 (3) UL (ref 1.5–7.7)
NEUTROPHILS # BLD AUTO: 3.95 X10(3) UL (ref 1.5–7.7)
NEUTROPHILS # BLD AUTO: 3.97 X10 (3) UL (ref 1.5–7.7)
NEUTROPHILS # BLD AUTO: 3.97 X10(3) UL (ref 1.5–7.7)
NEUTROPHILS # BLD AUTO: 5.53 X10 (3) UL (ref 1.5–7.7)
NEUTROPHILS # BLD AUTO: 5.53 X10(3) UL (ref 1.5–7.7)
NEUTROPHILS # BLD AUTO: 5.55 X10 (3) UL (ref 1.5–7.7)
NEUTROPHILS # BLD AUTO: 5.55 X10(3) UL (ref 1.5–7.7)
NEUTROPHILS # BLD AUTO: 5.96 X10 (3) UL (ref 1.5–7.7)
NEUTROPHILS # BLD AUTO: 5.96 X10(3) UL (ref 1.5–7.7)
NEUTROPHILS # BLD AUTO: 6.47 X10 (3) UL (ref 1.5–7.7)
NEUTROPHILS # BLD AUTO: 6.47 X10(3) UL (ref 1.5–7.7)
NEUTROPHILS # BLD AUTO: 6.53 X10 (3) UL (ref 1.5–7.7)
NEUTROPHILS # BLD AUTO: 6.53 X10(3) UL (ref 1.5–7.7)
NEUTROPHILS # BLD AUTO: 8.7 X10 (3) UL (ref 1.5–7.7)
NEUTROPHILS # BLD AUTO: 8.7 X10(3) UL (ref 1.5–7.7)
NEUTROPHILS NFR BLD AUTO: 75.5 %
NEUTROPHILS NFR BLD AUTO: 76.9 %
NEUTROPHILS NFR BLD AUTO: 77.4 %
NEUTROPHILS NFR BLD AUTO: 80.1 %
NEUTROPHILS NFR BLD AUTO: 80.7 %
NEUTROPHILS NFR BLD AUTO: 80.8 %
NEUTROPHILS NFR BLD AUTO: 81 %
NEUTROPHILS NFR BLD AUTO: 83.3 %
NEUTROPHILS NFR BLD AUTO: 87.2 %
NEUTROPHILS NFR BLD AUTO: 87.3 %
NEUTROPHILS NFR PLR: 3 %
NON GYNE INTERPRETATION: NEGATIVE
NONHDLC SERPL-MCNC: 18 MG/DL (ref ?–130)
NT-PROBNP SERPL-MCNC: 4514 PG/ML (ref ?–450)
OSMOLALITY SERPL CALC.SUM OF ELEC: 286 MOSM/KG (ref 275–295)
OSMOLALITY SERPL CALC.SUM OF ELEC: 292 MOSM/KG (ref 275–295)
OSMOLALITY SERPL CALC.SUM OF ELEC: 293 MOSM/KG (ref 275–295)
OSMOLALITY SERPL CALC.SUM OF ELEC: 295 MOSM/KG (ref 275–295)
OSMOLALITY SERPL CALC.SUM OF ELEC: 296 MOSM/KG (ref 275–295)
OSMOLALITY SERPL CALC.SUM OF ELEC: 296 MOSM/KG (ref 275–295)
OSMOLALITY SERPL CALC.SUM OF ELEC: 297 MOSM/KG (ref 275–295)
OSMOLALITY SERPL CALC.SUM OF ELEC: 298 MOSM/KG (ref 275–295)
OSMOLALITY SERPL CALC.SUM OF ELEC: 298 MOSM/KG (ref 275–295)
OSMOLALITY SERPL CALC.SUM OF ELEC: 299 MOSM/KG (ref 275–295)
OSMOLALITY SERPL CALC.SUM OF ELEC: 300 MOSM/KG (ref 275–295)
OSMOLALITY SERPL CALC.SUM OF ELEC: 301 MOSM/KG (ref 275–295)
OSMOLALITY SERPL CALC.SUM OF ELEC: 304 MOSM/KG (ref 275–295)
OXYHGB MFR BLDA: 90.7 % (ref 92–100)
OXYHGB MFR BLDA: 90.8 % (ref 92–100)
OXYHGB MFR BLDA: 91.5 % (ref 92–100)
OXYHGB MFR BLDA: 91.7 % (ref 92–100)
OXYHGB MFR BLDA: 91.9 % (ref 92–100)
OXYHGB MFR BLDA: 92.7 % (ref 92–100)
OXYHGB MFR BLDA: 93 % (ref 92–100)
OXYHGB MFR BLDA: 95.1 % (ref 92–100)
OXYHGB MFR BLDA: 95.2 % (ref 92–100)
P AXIS: 64 DEGREES
P AXIS: 69 DEGREES
P AXIS: 89 DEGREES
P AXIS: 98 DEGREES
P-R INTERVAL: 148 MS
P-R INTERVAL: 156 MS
P-R INTERVAL: 168 MS
P-R INTERVAL: 168 MS
P/F RATIO: 210 MMHG
P/F RATIO: 310 MMHG
PCO2 BLDA: 40 MM HG (ref 35–45)
PCO2 BLDA: 46 MM HG (ref 35–45)
PCO2 BLDA: 50 MM HG (ref 35–45)
PCO2 BLDA: 52 MM HG (ref 35–45)
PCO2 BLDA: 53 MM HG (ref 35–45)
PCO2 BLDA: 56 MM HG (ref 35–45)
PCO2 BLDA: 59 MM HG (ref 35–45)
PEEP: 5 CM H2O
PH BLDA: 7.4 [PH] (ref 7.35–7.45)
PH BLDA: 7.42 [PH] (ref 7.35–7.45)
PH BLDA: 7.44 [PH] (ref 7.35–7.45)
PH BLDA: 7.44 [PH] (ref 7.35–7.45)
PH BLDA: 7.47 [PH] (ref 7.35–7.45)
PH BLDA: 7.47 [PH] (ref 7.35–7.45)
PH BLDA: 7.5 [PH] (ref 7.35–7.45)
PH BLDA: 7.52 [PH] (ref 7.35–7.45)
PH BLDA: 7.57 [PH] (ref 7.35–7.45)
PH UR STRIP.AUTO: 6 [PH] (ref 5–8)
PHOSPHATE SERPL-MCNC: 4.1 MG/DL (ref 2.5–4.9)
PLATELET # BLD AUTO: 125 10(3)UL (ref 150–450)
PLATELET # BLD AUTO: 133 10(3)UL (ref 150–450)
PLATELET # BLD AUTO: 149 10(3)UL (ref 150–450)
PLATELET # BLD AUTO: 155 10(3)UL (ref 150–450)
PLATELET # BLD AUTO: 157 10(3)UL (ref 150–450)
PLATELET # BLD AUTO: 158 10(3)UL (ref 150–450)
PLATELET # BLD AUTO: 167 10(3)UL (ref 150–450)
PLATELET # BLD AUTO: 170 10(3)UL (ref 150–450)
PLATELET # BLD AUTO: 171 10(3)UL (ref 150–450)
PLATELET # BLD AUTO: 172 10(3)UL (ref 150–450)
PLATELET # BLD AUTO: 178 10(3)UL (ref 150–450)
PLATELET # BLD AUTO: 182 10(3)UL (ref 150–450)
PLATELET # BLD AUTO: 206 10(3)UL (ref 150–450)
PLATELET # BLD AUTO: 210 10(3)UL (ref 150–450)
PLATELET # BLD AUTO: 214 10(3)UL (ref 150–450)
PLATELET # BLD AUTO: 214 10(3)UL (ref 150–450)
PLATELET # BLD AUTO: 251 10(3)UL (ref 150–450)
PLATELET # BLD AUTO: 297 10(3)UL (ref 150–450)
PO2 BLDA: 103 MM HG (ref 80–100)
PO2 BLDA: 103 MM HG (ref 80–100)
PO2 BLDA: 105 MM HG (ref 80–100)
PO2 BLDA: 124 MM HG (ref 80–100)
PO2 BLDA: 141 MM HG (ref 80–100)
PO2 BLDA: 170 MM HG (ref 80–100)
PO2 BLDA: 70 MM HG (ref 80–100)
PO2 BLDA: 87 MM HG (ref 80–100)
PO2 BLDA: 92 MM HG (ref 80–100)
POTASSIUM BLD-SCNC: 3.6 MMOL/L (ref 3.6–5.1)
POTASSIUM BLD-SCNC: 3.8 MMOL/L (ref 3.6–5.1)
POTASSIUM BLD-SCNC: 4.1 MMOL/L (ref 3.6–5.1)
POTASSIUM BLD-SCNC: 4.1 MMOL/L (ref 3.6–5.1)
POTASSIUM BLD-SCNC: 4.4 MMOL/L (ref 3.6–5.1)
POTASSIUM BLD-SCNC: 4.5 MMOL/L (ref 3.6–5.1)
POTASSIUM SERPL-SCNC: 3.3 MMOL/L (ref 3.5–5.1)
POTASSIUM SERPL-SCNC: 3.3 MMOL/L (ref 3.5–5.1)
POTASSIUM SERPL-SCNC: 3.4 MMOL/L (ref 3.5–5.1)
POTASSIUM SERPL-SCNC: 3.5 MMOL/L (ref 3.5–5.1)
POTASSIUM SERPL-SCNC: 3.6 MMOL/L (ref 3.5–5.1)
POTASSIUM SERPL-SCNC: 3.7 MMOL/L (ref 3.5–5.1)
POTASSIUM SERPL-SCNC: 3.7 MMOL/L (ref 3.5–5.1)
POTASSIUM SERPL-SCNC: 3.8 MMOL/L (ref 3.5–5.1)
POTASSIUM SERPL-SCNC: 3.8 MMOL/L (ref 3.5–5.1)
POTASSIUM SERPL-SCNC: 3.9 MMOL/L (ref 3.5–5.1)
POTASSIUM SERPL-SCNC: 4 MMOL/L (ref 3.5–5.1)
POTASSIUM SERPL-SCNC: 4 MMOL/L (ref 3.5–5.1)
POTASSIUM SERPL-SCNC: 4.1 MMOL/L (ref 3.5–5.1)
POTASSIUM SERPL-SCNC: 4.3 MMOL/L (ref 3.5–5.1)
POTASSIUM SERPL-SCNC: 4.3 MMOL/L (ref 3.5–5.1)
POTASSIUM SERPL-SCNC: 5 MMOL/L (ref 3.5–5.1)
PRESSURE SUPPORT: 5 CM H2O
PRESSURE SUPPORT: 5 CM H2O
PROCALCITONIN SERPL-MCNC: 0.23 NG/ML (ref ?–0.16)
PROCALCITONIN SERPL-MCNC: <0.05 NG/ML (ref ?–0.16)
PROT PLR-MCNC: 1.7 G/DL
PROT SERPL-MCNC: 4.8 G/DL (ref 6.4–8.2)
PROT SERPL-MCNC: 5.3 G/DL (ref 6.4–8.2)
PROT SERPL-MCNC: 5.5 G/DL (ref 6.4–8.2)
PROT SERPL-MCNC: 5.7 G/DL (ref 6.4–8.2)
PROT SERPL-MCNC: 6.3 G/DL (ref 6.4–8.2)
PROT SERPL-MCNC: 6.4 G/DL (ref 6.4–8.2)
PROT UR STRIP.AUTO-MCNC: 50 MG/DL
PROTHROMBIN TIME: 12.9 SECONDS (ref 11.6–14.8)
PROTHROMBIN TIME: 14.1 SECONDS (ref 11.6–14.8)
Q-T INTERVAL: 422 MS
Q-T INTERVAL: 424 MS
Q-T INTERVAL: 598 MS
Q-T INTERVAL: 602 MS
QRS DURATION: 128 MS
QRS DURATION: 132 MS
QRS DURATION: 146 MS
QRS DURATION: 170 MS
QTC CALCULATION (BEZET): 481 MS
QTC CALCULATION (BEZET): 501 MS
QTC CALCULATION (BEZET): 524 MS
QTC CALCULATION (BEZET): 543 MS
R AXIS: -47 DEGREES
R AXIS: -48 DEGREES
R AXIS: -55 DEGREES
R AXIS: -59 DEGREES
RBC # BLD AUTO: 2.74 X10(6)UL
RBC # BLD AUTO: 2.8 X10(6)UL
RBC # BLD AUTO: 2.85 X10(6)UL
RBC # BLD AUTO: 2.95 X10(6)UL
RBC # BLD AUTO: 2.96 X10(6)UL
RBC # BLD AUTO: 2.98 X10(6)UL
RBC # BLD AUTO: 3.02 X10(6)UL
RBC # BLD AUTO: 3.03 X10(6)UL
RBC # BLD AUTO: 3.07 X10(6)UL
RBC # BLD AUTO: 3.08 X10(6)UL
RBC # BLD AUTO: 3.13 X10(6)UL
RBC # BLD AUTO: 3.21 X10(6)UL
RBC # BLD AUTO: 3.23 X10(6)UL
RBC # BLD AUTO: 3.26 X10(6)UL
RBC # BLD AUTO: 3.26 X10(6)UL
RBC # BLD AUTO: 3.28 X10(6)UL
RBC # BLD AUTO: 3.59 X10(6)UL
RBC # BLD AUTO: 3.69 X10(6)UL
RBC #/AREA URNS AUTO: >10 /HPF
RBC PLEURAL FLUID: <3000 /MM3
SODIUM BLD-SCNC: 134 MMOL/L (ref 135–145)
SODIUM BLD-SCNC: 135 MMOL/L (ref 135–145)
SODIUM BLD-SCNC: 136 MMOL/L (ref 135–145)
SODIUM BLD-SCNC: 137 MMOL/L (ref 135–145)
SODIUM BLD-SCNC: 138 MMOL/L (ref 135–145)
SODIUM BLD-SCNC: 139 MMOL/L (ref 135–145)
SODIUM SERPL-SCNC: 135 MMOL/L (ref 136–145)
SODIUM SERPL-SCNC: 136 MMOL/L (ref 136–145)
SODIUM SERPL-SCNC: 136 MMOL/L (ref 136–145)
SODIUM SERPL-SCNC: 137 MMOL/L (ref 136–145)
SODIUM SERPL-SCNC: 138 MMOL/L (ref 136–145)
SODIUM SERPL-SCNC: 138 MMOL/L (ref 136–145)
SODIUM SERPL-SCNC: 139 MMOL/L (ref 136–145)
SODIUM SERPL-SCNC: 139 MMOL/L (ref 136–145)
SODIUM SERPL-SCNC: 140 MMOL/L (ref 136–145)
SODIUM SERPL-SCNC: 141 MMOL/L (ref 136–145)
SODIUM SERPL-SCNC: 141 MMOL/L (ref 136–145)
SODIUM SERPL-SCNC: 142 MMOL/L (ref 136–145)
SODIUM SERPL-SCNC: 143 MMOL/L (ref 136–145)
SODIUM SERPL-SCNC: 143 MMOL/L (ref 136–145)
SODIUM SERPL-SCNC: 144 MMOL/L (ref 136–145)
SODIUM SERPL-SCNC: 147 MMOL/L (ref 136–145)
SP GR UR STRIP.AUTO: 1.02 (ref 1–1.03)
T AXIS: -60 DEGREES
T AXIS: 142 DEGREES
T AXIS: 7 DEGREES
T AXIS: 79 DEGREES
TIDAL VOLUME: 400 ML
TIDAL VOLUME: 500 ML
TOTAL CELLS COUNTED FLD: 100
TRIGL SERPL-MCNC: 56 MG/DL (ref 30–149)
TRIGL SERPL-MCNC: 65 MG/DL (ref 30–149)
TRIGL SERPL-MCNC: 77 MG/DL (ref 30–149)
TRIGL SERPL-MCNC: 90 MG/DL (ref 30–149)
TROPONIN I HIGH SENSITIVITY: 61 NG/L
TROPONIN I HIGH SENSITIVITY: 74 NG/L
TROPONIN I HIGH SENSITIVITY: 79 NG/L
TSI SER-ACNC: 1.54 MIU/ML (ref 0.36–3.74)
TSI SER-ACNC: 3.29 MIU/ML (ref 0.36–3.74)
TURBIDITY CSF QL: CLEAR
UROBILINOGEN UR STRIP.AUTO-MCNC: NORMAL MG/DL
VENT RATE: 10 /MIN
VENT RATE: 10 /MIN
VENT RATE: 13 /MIN
VENT RATE: 16 /MIN
VENT RATE: 8 /MIN
VENT RATE: 8 /MIN
VENTRICULAR RATE: 39 BPM
VENTRICULAR RATE: 49 BPM
VENTRICULAR RATE: 84 BPM
VENTRICULAR RATE: 93 BPM
VLDLC SERPL CALC-MCNC: 8 MG/DL (ref 0–30)
WBC # BLD AUTO: 10.4 X10(3) UL (ref 4–11)
WBC # BLD AUTO: 3.9 X10(3) UL (ref 4–11)
WBC # BLD AUTO: 4.4 X10(3) UL (ref 4–11)
WBC # BLD AUTO: 4.7 X10(3) UL (ref 4–11)
WBC # BLD AUTO: 4.9 X10(3) UL (ref 4–11)
WBC # BLD AUTO: 5 X10(3) UL (ref 4–11)
WBC # BLD AUTO: 5.1 X10(3) UL (ref 4–11)
WBC # BLD AUTO: 5.1 X10(3) UL (ref 4–11)
WBC # BLD AUTO: 5.4 X10(3) UL (ref 4–11)
WBC # BLD AUTO: 6.1 X10(3) UL (ref 4–11)
WBC # BLD AUTO: 6.4 X10(3) UL (ref 4–11)
WBC # BLD AUTO: 6.7 X10(3) UL (ref 4–11)
WBC # BLD AUTO: 6.8 X10(3) UL (ref 4–11)
WBC # BLD AUTO: 6.9 X10(3) UL (ref 4–11)
WBC # BLD AUTO: 7.1 X10(3) UL (ref 4–11)
WBC # BLD AUTO: 7.4 X10(3) UL (ref 4–11)
WBC # BLD AUTO: 7.4 X10(3) UL (ref 4–11)
WBC # BLD AUTO: 8.2 X10(3) UL (ref 4–11)
WBC # PLR: 181 /MM3

## 2023-01-01 PROCEDURE — 95720 EEG PHY/QHP EA INCR W/VEEG: CPT | Performed by: OTHER

## 2023-01-01 PROCEDURE — 93306 TTE W/DOPPLER COMPLETE: CPT | Performed by: HOSPITALIST

## 2023-01-01 PROCEDURE — 99233 SBSQ HOSP IP/OBS HIGH 50: CPT | Performed by: HOSPITALIST

## 2023-01-01 PROCEDURE — 0BH17EZ INSERTION OF ENDOTRACHEAL AIRWAY INTO TRACHEA, VIA NATURAL OR ARTIFICIAL OPENING: ICD-10-PCS | Performed by: ANESTHESIOLOGY

## 2023-01-01 PROCEDURE — 0042T CT STROKE(DAWN BRAIN)CTA BRAIN/CTA NECK+PERF(CPT=70496/70498/0042T): CPT | Performed by: HOSPITALIST

## 2023-01-01 PROCEDURE — 99232 SBSQ HOSP IP/OBS MODERATE 35: CPT | Performed by: INTERNAL MEDICINE

## 2023-01-01 PROCEDURE — 99291 CRITICAL CARE FIRST HOUR: CPT | Performed by: HOSPITALIST

## 2023-01-01 PROCEDURE — 99291 CRITICAL CARE FIRST HOUR: CPT | Performed by: INTERNAL MEDICINE

## 2023-01-01 PROCEDURE — 74018 RADEX ABDOMEN 1 VIEW: CPT | Performed by: INTERNAL MEDICINE

## 2023-01-01 PROCEDURE — 71045 X-RAY EXAM CHEST 1 VIEW: CPT | Performed by: INTERNAL MEDICINE

## 2023-01-01 PROCEDURE — 71045 X-RAY EXAM CHEST 1 VIEW: CPT | Performed by: HOSPITALIST

## 2023-01-01 PROCEDURE — 5A1955Z RESPIRATORY VENTILATION, GREATER THAN 96 CONSECUTIVE HOURS: ICD-10-PCS | Performed by: ANESTHESIOLOGY

## 2023-01-01 PROCEDURE — 71275 CT ANGIOGRAPHY CHEST: CPT | Performed by: INTERNAL MEDICINE

## 2023-01-01 PROCEDURE — 74340 X-RAY GUIDE FOR GI TUBE: CPT | Performed by: PHYSICIAN ASSISTANT

## 2023-01-01 PROCEDURE — 99233 SBSQ HOSP IP/OBS HIGH 50: CPT | Performed by: INTERNAL MEDICINE

## 2023-01-01 PROCEDURE — 70496 CT ANGIOGRAPHY HEAD: CPT | Performed by: HOSPITALIST

## 2023-01-01 PROCEDURE — 44500 INTRO GASTROINTESTINAL TUBE: CPT | Performed by: PHYSICIAN ASSISTANT

## 2023-01-01 PROCEDURE — 99292 CRITICAL CARE ADDL 30 MIN: CPT | Performed by: INTERNAL MEDICINE

## 2023-01-01 PROCEDURE — 0B9F8ZX DRAINAGE OF RIGHT LOWER LUNG LOBE, VIA NATURAL OR ARTIFICIAL OPENING ENDOSCOPIC, DIAGNOSTIC: ICD-10-PCS | Performed by: INTERNAL MEDICINE

## 2023-01-01 PROCEDURE — 99223 1ST HOSP IP/OBS HIGH 75: CPT | Performed by: HOSPITALIST

## 2023-01-01 PROCEDURE — 3E033XZ INTRODUCTION OF VASOPRESSOR INTO PERIPHERAL VEIN, PERCUTANEOUS APPROACH: ICD-10-PCS | Performed by: INTERNAL MEDICINE

## 2023-01-01 PROCEDURE — 71045 X-RAY EXAM CHEST 1 VIEW: CPT | Performed by: NURSE PRACTITIONER

## 2023-01-01 PROCEDURE — 32555 ASPIRATE PLEURA W/ IMAGING: CPT | Performed by: INTERNAL MEDICINE

## 2023-01-01 PROCEDURE — 70450 CT HEAD/BRAIN W/O DYE: CPT | Performed by: HOSPITALIST

## 2023-01-01 PROCEDURE — 2Y41X5Z PACKING OF NASAL REGION USING PACKING MATERIAL: ICD-10-PCS | Performed by: OTOLARYNGOLOGY

## 2023-01-01 PROCEDURE — 70498 CT ANGIOGRAPHY NECK: CPT | Performed by: HOSPITALIST

## 2023-01-01 PROCEDURE — 0W993ZZ DRAINAGE OF RIGHT PLEURAL CAVITY, PERCUTANEOUS APPROACH: ICD-10-PCS | Performed by: RADIOLOGY

## 2023-01-01 PROCEDURE — 70551 MRI BRAIN STEM W/O DYE: CPT | Performed by: INTERNAL MEDICINE

## 2023-01-01 PROCEDURE — 5A1945Z RESPIRATORY VENTILATION, 24-96 CONSECUTIVE HOURS: ICD-10-PCS | Performed by: ANESTHESIOLOGY

## 2023-01-01 PROCEDURE — 93880 EXTRACRANIAL BILAT STUDY: CPT | Performed by: HOSPITALIST

## 2023-01-01 PROCEDURE — 93970 EXTREMITY STUDY: CPT | Performed by: INTERNAL MEDICINE

## 2023-01-01 PROCEDURE — 70450 CT HEAD/BRAIN W/O DYE: CPT | Performed by: EMERGENCY MEDICINE

## 2023-01-01 PROCEDURE — 99232 SBSQ HOSP IP/OBS MODERATE 35: CPT | Performed by: HOSPITALIST

## 2023-01-01 RX ORDER — ALBUMIN (HUMAN) 12.5 G/50ML
25 SOLUTION INTRAVENOUS ONCE
Status: COMPLETED | OUTPATIENT
Start: 2023-01-01 | End: 2023-01-01

## 2023-01-01 RX ORDER — KETOROLAC TROMETHAMINE 30 MG/ML
15 INJECTION, SOLUTION INTRAMUSCULAR; INTRAVENOUS EVERY 6 HOURS PRN
Status: ACTIVE | OUTPATIENT
Start: 2023-01-01 | End: 2023-01-01

## 2023-01-01 RX ORDER — LEVETIRACETAM 500 MG/5ML
1500 INJECTION, SOLUTION, CONCENTRATE INTRAVENOUS EVERY 12 HOURS
Status: DISCONTINUED | OUTPATIENT
Start: 2023-01-01 | End: 2023-01-01

## 2023-01-01 RX ORDER — AMOXICILLIN AND CLAVULANATE POTASSIUM 250; 62.5 MG/5ML; MG/5ML
500 POWDER, FOR SUSPENSION ORAL EVERY 12 HOURS
Status: COMPLETED | OUTPATIENT
Start: 2023-01-01 | End: 2023-01-01

## 2023-01-01 RX ORDER — SODIUM CHLORIDE 9 MG/ML
125 INJECTION, SOLUTION INTRAVENOUS CONTINUOUS
Status: DISCONTINUED | OUTPATIENT
Start: 2023-01-01 | End: 2023-01-01

## 2023-01-01 RX ORDER — FUROSEMIDE 10 MG/ML
40 INJECTION INTRAMUSCULAR; INTRAVENOUS ONCE
Status: COMPLETED | OUTPATIENT
Start: 2023-01-01 | End: 2023-01-01

## 2023-01-01 RX ORDER — ATORVASTATIN CALCIUM 40 MG/1
40 TABLET, FILM COATED ORAL NIGHTLY
Status: DISCONTINUED | OUTPATIENT
Start: 2023-01-01 | End: 2023-01-01

## 2023-01-01 RX ORDER — CLOPIDOGREL BISULFATE 75 MG/1
75 TABLET ORAL DAILY
Status: DISCONTINUED | OUTPATIENT
Start: 2023-01-01 | End: 2023-01-01

## 2023-01-01 RX ORDER — NICOTINE POLACRILEX 4 MG
15 LOZENGE BUCCAL
Status: DISCONTINUED | OUTPATIENT
Start: 2023-01-01 | End: 2023-01-01

## 2023-01-01 RX ORDER — ACETAMINOPHEN 500 MG
500 TABLET ORAL EVERY 4 HOURS PRN
Status: DISCONTINUED | OUTPATIENT
Start: 2023-01-01 | End: 2023-01-01

## 2023-01-01 RX ORDER — DOPAMINE HYDROCHLORIDE 320 MG/100ML
INJECTION, SOLUTION INTRAVENOUS CONTINUOUS
Status: DISCONTINUED | OUTPATIENT
Start: 2023-01-01 | End: 2023-01-01

## 2023-01-01 RX ORDER — HEPARIN SODIUM 5000 [USP'U]/ML
5000 INJECTION, SOLUTION INTRAVENOUS; SUBCUTANEOUS EVERY 12 HOURS SCHEDULED
Status: DISCONTINUED | OUTPATIENT
Start: 2023-01-01 | End: 2023-01-01

## 2023-01-01 RX ORDER — FUROSEMIDE 10 MG/ML
20 INJECTION INTRAMUSCULAR; INTRAVENOUS ONCE
Status: COMPLETED | OUTPATIENT
Start: 2023-01-01 | End: 2023-01-01

## 2023-01-01 RX ORDER — ASPIRIN 81 MG/1
324 TABLET, CHEWABLE ORAL ONCE
Status: COMPLETED | OUTPATIENT
Start: 2023-01-01 | End: 2023-01-01

## 2023-01-01 RX ORDER — MAGNESIUM OXIDE 400 MG/1
400 TABLET ORAL DAILY
Status: DISCONTINUED | OUTPATIENT
Start: 2023-01-01 | End: 2023-01-01

## 2023-01-01 RX ORDER — LEVOTHYROXINE SODIUM 0.2 MG/1
200 TABLET ORAL
Qty: 30 TABLET | Refills: 0 | Status: SHIPPED | OUTPATIENT
Start: 2023-01-01 | End: 2023-01-01

## 2023-01-01 RX ORDER — ETOMIDATE 2 MG/ML
INJECTION INTRAVENOUS
Status: DISCONTINUED
Start: 2023-01-01 | End: 2023-01-01 | Stop reason: WASHOUT

## 2023-01-01 RX ORDER — PHENYLEPHRINE HCL IN 0.9% NACL 50MG/250ML
PLASTIC BAG, INJECTION (ML) INTRAVENOUS CONTINUOUS
Status: DISCONTINUED | OUTPATIENT
Start: 2023-01-01 | End: 2023-01-01

## 2023-01-01 RX ORDER — SODIUM CHLORIDE 9 MG/ML
INJECTION, SOLUTION INTRAVENOUS CONTINUOUS
Status: DISCONTINUED | OUTPATIENT
Start: 2023-01-01 | End: 2023-01-01

## 2023-01-01 RX ORDER — CHLORHEXIDINE GLUCONATE 0.12 MG/ML
15 RINSE ORAL
Status: DISCONTINUED | OUTPATIENT
Start: 2023-01-01 | End: 2023-01-01 | Stop reason: ALTCHOICE

## 2023-01-01 RX ORDER — NICOTINE POLACRILEX 4 MG
30 LOZENGE BUCCAL
Status: DISCONTINUED | OUTPATIENT
Start: 2023-01-01 | End: 2023-01-01

## 2023-01-01 RX ORDER — DEXTROSE MONOHYDRATE 50 MG/ML
INJECTION, SOLUTION INTRAVENOUS CONTINUOUS
Status: DISCONTINUED | OUTPATIENT
Start: 2023-01-01 | End: 2023-01-01

## 2023-01-01 RX ORDER — PHENYLEPHRINE HCL IN 0.9% NACL 50MG/250ML
PLASTIC BAG, INJECTION (ML) INTRAVENOUS
Status: COMPLETED
Start: 2023-01-01 | End: 2023-01-01

## 2023-01-01 RX ORDER — LACOSAMIDE 10 MG/ML
200 INJECTION, SOLUTION INTRAVENOUS EVERY 12 HOURS
Status: DISCONTINUED | OUTPATIENT
Start: 2023-01-01 | End: 2023-01-01

## 2023-01-01 RX ORDER — CHLORHEXIDINE GLUCONATE 0.12 MG/ML
15 RINSE ORAL
Status: DISCONTINUED | OUTPATIENT
Start: 2023-01-01 | End: 2023-01-01

## 2023-01-01 RX ORDER — ACETAMINOPHEN 325 MG/1
650 TABLET ORAL EVERY 4 HOURS PRN
Status: DISCONTINUED | OUTPATIENT
Start: 2023-01-01 | End: 2023-01-01

## 2023-01-01 RX ORDER — FUROSEMIDE 10 MG/ML
40 INJECTION INTRAMUSCULAR; INTRAVENOUS DAILY
Status: DISCONTINUED | OUTPATIENT
Start: 2023-01-01 | End: 2023-01-01

## 2023-01-01 RX ORDER — TAMSULOSIN HYDROCHLORIDE 0.4 MG/1
0.4 CAPSULE ORAL
Status: DISCONTINUED | OUTPATIENT
Start: 2023-01-01 | End: 2023-01-01

## 2023-01-01 RX ORDER — GLYCOPYRROLATE 0.2 MG/ML
0.2 INJECTION, SOLUTION INTRAMUSCULAR; INTRAVENOUS
Status: DISCONTINUED | OUTPATIENT
Start: 2023-01-01 | End: 2023-01-01

## 2023-01-01 RX ORDER — ASPIRIN 325 MG
325 TABLET, DELAYED RELEASE (ENTERIC COATED) ORAL DAILY
Status: DISCONTINUED | OUTPATIENT
Start: 2023-01-01 | End: 2023-01-01

## 2023-01-01 RX ORDER — FINASTERIDE 5 MG/1
5 TABLET, FILM COATED ORAL DAILY
Status: DISCONTINUED | OUTPATIENT
Start: 2023-01-01 | End: 2023-01-01

## 2023-01-01 RX ORDER — ONDANSETRON 2 MG/ML
4 INJECTION INTRAMUSCULAR; INTRAVENOUS EVERY 6 HOURS PRN
Status: DISCONTINUED | OUTPATIENT
Start: 2023-01-01 | End: 2023-01-01

## 2023-01-01 RX ORDER — FAMOTIDINE 10 MG/ML
20 INJECTION, SOLUTION INTRAVENOUS NIGHTLY
Status: DISCONTINUED | OUTPATIENT
Start: 2023-01-01 | End: 2023-01-01

## 2023-01-01 RX ORDER — ACETAMINOPHEN 10 MG/ML
1000 INJECTION, SOLUTION INTRAVENOUS EVERY 6 HOURS PRN
Status: DISCONTINUED | OUTPATIENT
Start: 2023-01-01 | End: 2023-01-01

## 2023-01-01 RX ORDER — DEXTROSE MONOHYDRATE 50 MG/ML
INJECTION, SOLUTION INTRAVENOUS CONTINUOUS
Status: ACTIVE | OUTPATIENT
Start: 2023-01-01 | End: 2023-01-01

## 2023-01-01 RX ORDER — DEXMEDETOMIDINE HYDROCHLORIDE 4 UG/ML
INJECTION, SOLUTION INTRAVENOUS CONTINUOUS
Status: DISCONTINUED | OUTPATIENT
Start: 2023-01-01 | End: 2023-01-01 | Stop reason: ALTCHOICE

## 2023-01-01 RX ORDER — LORAZEPAM 2 MG/ML
1 INJECTION INTRAMUSCULAR ONCE
Status: DISCONTINUED | OUTPATIENT
Start: 2023-01-01 | End: 2023-01-01

## 2023-01-01 RX ORDER — DEXTROSE MONOHYDRATE 25 G/50ML
50 INJECTION, SOLUTION INTRAVENOUS
Status: DISCONTINUED | OUTPATIENT
Start: 2023-01-01 | End: 2023-01-01

## 2023-01-01 RX ORDER — CEFUROXIME AXETIL 500 MG/1
500 TABLET ORAL 2 TIMES DAILY
Qty: 8 TABLET | Refills: 0 | Status: SHIPPED | OUTPATIENT
Start: 2023-01-01 | End: 2023-01-01

## 2023-01-01 RX ORDER — MAGNESIUM OXIDE 400 MG/1
400 TABLET ORAL ONCE
Status: COMPLETED | OUTPATIENT
Start: 2023-01-01 | End: 2023-01-01

## 2023-01-01 RX ORDER — MAGNESIUM SULFATE HEPTAHYDRATE 40 MG/ML
2 INJECTION, SOLUTION INTRAVENOUS ONCE
Status: COMPLETED | OUTPATIENT
Start: 2023-01-01 | End: 2023-01-01

## 2023-01-01 RX ORDER — ETOMIDATE 2 MG/ML
INJECTION INTRAVENOUS
Status: COMPLETED
Start: 2023-01-01 | End: 2023-01-01

## 2023-01-01 RX ORDER — DEXTROSE MONOHYDRATE 25 G/50ML
INJECTION, SOLUTION INTRAVENOUS
Status: COMPLETED
Start: 2023-01-01 | End: 2023-01-01

## 2023-01-01 RX ORDER — LOPERAMIDE HCL 1 MG/7.5ML
2 SOLUTION ORAL 4 TIMES DAILY PRN
Status: DISCONTINUED | OUTPATIENT
Start: 2023-01-01 | End: 2023-01-01

## 2023-01-01 RX ORDER — MIDAZOLAM HYDROCHLORIDE 1 MG/ML
2 INJECTION INTRAMUSCULAR; INTRAVENOUS ONCE
Status: DISCONTINUED | OUTPATIENT
Start: 2023-01-01 | End: 2023-01-01 | Stop reason: ALTCHOICE

## 2023-01-01 RX ORDER — DEXTROSE AND SODIUM CHLORIDE 5; .45 G/100ML; G/100ML
INJECTION, SOLUTION INTRAVENOUS CONTINUOUS
Status: CANCELLED | OUTPATIENT
Start: 2023-01-01 | End: 2023-01-01

## 2023-01-01 RX ORDER — MAGNESIUM OXIDE 400 MG/1
800 TABLET ORAL ONCE
Status: COMPLETED | OUTPATIENT
Start: 2023-01-01 | End: 2023-01-01

## 2023-01-01 RX ORDER — FUROSEMIDE 10 MG/ML
40 INJECTION INTRAMUSCULAR; INTRAVENOUS DAILY
Status: COMPLETED | OUTPATIENT
Start: 2023-01-01 | End: 2023-01-01

## 2023-01-01 RX ORDER — ASPIRIN 81 MG/1
81 TABLET, CHEWABLE ORAL DAILY
Status: DISCONTINUED | OUTPATIENT
Start: 2023-01-01 | End: 2023-01-01

## 2023-01-01 RX ORDER — BISACODYL 10 MG
10 SUPPOSITORY, RECTAL RECTAL
Status: DISCONTINUED | OUTPATIENT
Start: 2023-01-01 | End: 2023-01-01

## 2023-01-01 RX ORDER — ASPIRIN 325 MG
325 TABLET ORAL DAILY
Status: DISCONTINUED | OUTPATIENT
Start: 2023-01-01 | End: 2023-01-01

## 2023-01-01 RX ORDER — ATROPINE SULFATE 10 MG/ML
2 SOLUTION/ DROPS OPHTHALMIC EVERY 2 HOUR PRN
Status: DISCONTINUED | OUTPATIENT
Start: 2023-01-01 | End: 2023-01-01

## 2023-01-01 RX ORDER — ENEMA 19; 7 G/133ML; G/133ML
1 ENEMA RECTAL ONCE AS NEEDED
Status: DISCONTINUED | OUTPATIENT
Start: 2023-01-01 | End: 2023-01-01

## 2023-01-01 RX ORDER — METOCLOPRAMIDE HYDROCHLORIDE 5 MG/ML
5 INJECTION INTRAMUSCULAR; INTRAVENOUS EVERY 8 HOURS PRN
Status: DISCONTINUED | OUTPATIENT
Start: 2023-01-01 | End: 2023-01-01

## 2023-01-01 RX ORDER — POLYETHYLENE GLYCOL 3350 17 G/17G
17 POWDER, FOR SOLUTION ORAL DAILY PRN
Status: DISCONTINUED | OUTPATIENT
Start: 2023-01-01 | End: 2023-01-01

## 2023-01-01 RX ORDER — ASPIRIN 300 MG/1
300 SUPPOSITORY RECTAL DAILY
Status: DISCONTINUED | OUTPATIENT
Start: 2023-01-01 | End: 2023-01-01

## 2023-01-01 RX ORDER — LACOSAMIDE 10 MG/ML
200 INJECTION, SOLUTION INTRAVENOUS ONCE
Status: COMPLETED | OUTPATIENT
Start: 2023-01-01 | End: 2023-01-01

## 2023-01-01 RX ORDER — ALBUMIN (HUMAN) 12.5 G/50ML
25 SOLUTION INTRAVENOUS EVERY 8 HOURS
Status: COMPLETED | OUTPATIENT
Start: 2023-01-01 | End: 2023-01-01

## 2023-01-01 RX ORDER — HYDRALAZINE HYDROCHLORIDE 20 MG/ML
10 INJECTION INTRAMUSCULAR; INTRAVENOUS EVERY 4 HOURS PRN
Status: DISCONTINUED | OUTPATIENT
Start: 2023-01-01 | End: 2023-01-01

## 2023-01-01 RX ORDER — LEVETIRACETAM 100 MG/ML
1500 SOLUTION ORAL 2 TIMES DAILY
Status: DISCONTINUED | OUTPATIENT
Start: 2023-01-01 | End: 2023-01-01

## 2023-01-01 RX ORDER — ASPIRIN 300 MG/1
300 SUPPOSITORY RECTAL DAILY
Status: DISCONTINUED | OUTPATIENT
Start: 2023-01-01 | End: 2023-01-01 | Stop reason: SDUPTHER

## 2023-01-01 RX ORDER — SODIUM BICARBONATE 325 MG/1
325 TABLET ORAL AS NEEDED
Status: DISCONTINUED | OUTPATIENT
Start: 2023-01-01 | End: 2023-01-01

## 2023-01-01 RX ORDER — MELATONIN
3 NIGHTLY PRN
Status: DISCONTINUED | OUTPATIENT
Start: 2023-01-01 | End: 2023-01-01

## 2023-01-01 RX ORDER — ACETAMINOPHEN 650 MG/1
650 SUPPOSITORY RECTAL EVERY 4 HOURS PRN
Status: DISCONTINUED | OUTPATIENT
Start: 2023-01-01 | End: 2023-01-01

## 2023-01-01 RX ORDER — FUROSEMIDE 10 MG/ML
INJECTION INTRAMUSCULAR; INTRAVENOUS
Status: COMPLETED
Start: 2023-01-01 | End: 2023-01-01

## 2023-01-01 RX ORDER — SCOLOPAMINE TRANSDERMAL SYSTEM 1 MG/1
1 PATCH, EXTENDED RELEASE TRANSDERMAL
Status: DISCONTINUED | OUTPATIENT
Start: 2023-01-01 | End: 2023-01-01

## 2023-01-01 RX ORDER — CEFUROXIME AXETIL 500 MG/1
500 TABLET ORAL DAILY
Qty: 4 TABLET | Refills: 0 | Status: SHIPPED | OUTPATIENT
Start: 2023-01-01 | End: 2023-01-01

## 2023-01-01 RX ORDER — DEXMEDETOMIDINE HYDROCHLORIDE 4 UG/ML
INJECTION, SOLUTION INTRAVENOUS CONTINUOUS
Status: DISCONTINUED | OUTPATIENT
Start: 2023-01-01 | End: 2023-01-01

## 2023-01-01 RX ORDER — FUROSEMIDE 10 MG/ML
20 INJECTION INTRAMUSCULAR; INTRAVENOUS
Status: COMPLETED | OUTPATIENT
Start: 2023-01-01 | End: 2023-01-01

## 2023-01-01 RX ORDER — FUROSEMIDE 10 MG/ML
40 INJECTION INTRAMUSCULAR; INTRAVENOUS
Status: DISCONTINUED | OUTPATIENT
Start: 2023-01-01 | End: 2023-01-01

## 2023-01-01 RX ORDER — ACETAMINOPHEN 160 MG/5ML
650 SOLUTION ORAL EVERY 4 HOURS PRN
Status: DISCONTINUED | OUTPATIENT
Start: 2023-01-01 | End: 2023-01-01

## 2023-01-01 RX ORDER — POTASSIUM CHLORIDE 1.5 G/1.58G
40 POWDER, FOR SOLUTION ORAL ONCE
Status: COMPLETED | OUTPATIENT
Start: 2023-01-01 | End: 2023-01-01

## 2023-01-01 RX ORDER — LISINOPRIL 20 MG/1
20 TABLET ORAL DAILY
Status: DISCONTINUED | OUTPATIENT
Start: 2023-01-01 | End: 2023-01-01

## 2023-01-01 RX ORDER — SENNOSIDES 8.8 MG/5ML
10 LIQUID ORAL NIGHTLY PRN
Status: DISCONTINUED | OUTPATIENT
Start: 2023-01-01 | End: 2023-01-01

## 2023-01-01 RX ORDER — TRANEXAMIC ACID 10 MG/ML
1000 INJECTION, SOLUTION INTRAVENOUS ONCE
Status: COMPLETED | OUTPATIENT
Start: 2023-01-01 | End: 2023-01-01

## 2023-01-01 RX ORDER — HEPARIN SODIUM 5000 [USP'U]/ML
7500 INJECTION, SOLUTION INTRAVENOUS; SUBCUTANEOUS EVERY 12 HOURS SCHEDULED
Status: DISCONTINUED | OUTPATIENT
Start: 2023-01-01 | End: 2023-01-01

## 2023-01-01 RX ORDER — SERTRALINE HYDROCHLORIDE 100 MG/1
100 TABLET, FILM COATED ORAL DAILY
Status: DISCONTINUED | OUTPATIENT
Start: 2023-01-01 | End: 2023-01-01

## 2023-01-01 NOTE — ED QUICK NOTES
Pt states she fell from using walker at home; tripped over walker & carpet; no LOC; EMS reports only head and right leg laceration; pt was [facility] on route to ER

## 2023-01-01 NOTE — DISCHARGE INSTRUCTIONS
Return to ER if fever, discharge, redness or other problems    Clean and dry for 48 hours    Polysporin to wound    Suture removal 7 days

## 2023-01-01 NOTE — ED INITIAL ASSESSMENT (HPI)
Patient here with c/o fall. Patient was walking on her walker when it got caught and she fell hitting her head. Patient has laceration to left eyebrow. Denies LOC or pain.   Patient AOx4

## 2023-01-10 ENCOUNTER — OFFICE VISIT (OUTPATIENT)
Dept: FAMILY MEDICINE CLINIC | Facility: CLINIC | Age: 83
End: 2023-01-10
Payer: MEDICARE

## 2023-01-10 ENCOUNTER — LAB ENCOUNTER (OUTPATIENT)
Dept: LAB | Age: 83
End: 2023-01-10
Attending: NURSE PRACTITIONER
Payer: MEDICARE

## 2023-01-10 VITALS
SYSTOLIC BLOOD PRESSURE: 160 MMHG | HEIGHT: 64 IN | TEMPERATURE: 97 F | HEART RATE: 76 BPM | BODY MASS INDEX: 35.85 KG/M2 | WEIGHT: 210 LBS | RESPIRATION RATE: 20 BRPM | DIASTOLIC BLOOD PRESSURE: 80 MMHG

## 2023-01-10 DIAGNOSIS — E03.9 HYPOTHYROIDISM, UNSPECIFIED TYPE: ICD-10-CM

## 2023-01-10 DIAGNOSIS — D63.1 ANEMIA DUE TO STAGE 3 CHRONIC KIDNEY DISEASE, UNSPECIFIED WHETHER STAGE 3A OR 3B CKD (HCC): ICD-10-CM

## 2023-01-10 DIAGNOSIS — N18.30 ANEMIA DUE TO STAGE 3 CHRONIC KIDNEY DISEASE, UNSPECIFIED WHETHER STAGE 3A OR 3B CKD (HCC): ICD-10-CM

## 2023-01-10 DIAGNOSIS — E78.2 MIXED HYPERLIPIDEMIA: ICD-10-CM

## 2023-01-10 DIAGNOSIS — F32.A ANXIETY AND DEPRESSION: ICD-10-CM

## 2023-01-10 DIAGNOSIS — F41.9 ANXIETY AND DEPRESSION: ICD-10-CM

## 2023-01-10 DIAGNOSIS — E11.9 TYPE 2 DIABETES MELLITUS WITHOUT COMPLICATION, WITHOUT LONG-TERM CURRENT USE OF INSULIN (HCC): ICD-10-CM

## 2023-01-10 DIAGNOSIS — Z48.02 VISIT FOR SUTURE REMOVAL: Primary | ICD-10-CM

## 2023-01-10 DIAGNOSIS — I10 ESSENTIAL HYPERTENSION: ICD-10-CM

## 2023-01-10 DIAGNOSIS — S01.81XD FACIAL LACERATION, SUBSEQUENT ENCOUNTER: ICD-10-CM

## 2023-01-10 DIAGNOSIS — I25.10 CORONARY ARTERY DISEASE INVOLVING NATIVE CORONARY ARTERY OF NATIVE HEART WITHOUT ANGINA PECTORIS: ICD-10-CM

## 2023-01-10 DIAGNOSIS — I35.0 AORTIC STENOSIS, MODERATE: ICD-10-CM

## 2023-01-10 LAB
ALBUMIN SERPL-MCNC: 2.7 G/DL (ref 3.4–5)
ALBUMIN/GLOB SERPL: 0.8 {RATIO} (ref 1–2)
ALP LIVER SERPL-CCNC: 129 U/L
ALT SERPL-CCNC: 9 U/L
ANION GAP SERPL CALC-SCNC: 5 MMOL/L (ref 0–18)
AST SERPL-CCNC: 8 U/L (ref 15–37)
BASOPHILS # BLD AUTO: 0.04 X10(3) UL (ref 0–0.2)
BASOPHILS NFR BLD AUTO: 0.6 %
BILIRUB SERPL-MCNC: 0.3 MG/DL (ref 0.1–2)
BUN BLD-MCNC: 23 MG/DL (ref 7–18)
BUN/CREAT SERPL: 17.3 (ref 10–20)
CALCIUM BLD-MCNC: 7.8 MG/DL (ref 8.5–10.1)
CHLORIDE SERPL-SCNC: 113 MMOL/L (ref 98–112)
CHOLEST SERPL-MCNC: 93 MG/DL (ref ?–200)
CO2 SERPL-SCNC: 27 MMOL/L (ref 21–32)
CREAT BLD-MCNC: 1.33 MG/DL
DEPRECATED RDW RBC AUTO: 64.7 FL (ref 35.1–46.3)
EOSINOPHIL # BLD AUTO: 0.4 X10(3) UL (ref 0–0.7)
EOSINOPHIL NFR BLD AUTO: 6.4 %
ERYTHROCYTE [DISTWIDTH] IN BLOOD BY AUTOMATED COUNT: 19.9 % (ref 11–15)
EST. AVERAGE GLUCOSE BLD GHB EST-MCNC: 117 MG/DL (ref 68–126)
FASTING PATIENT LIPID ANSWER: NO
FASTING STATUS PATIENT QL REPORTED: NO
GFR SERPLBLD BASED ON 1.73 SQ M-ARVRAT: 40 ML/MIN/1.73M2 (ref 60–?)
GLOBULIN PLAS-MCNC: 3.3 G/DL (ref 2.8–4.4)
GLUCOSE BLD-MCNC: 100 MG/DL (ref 70–99)
HBA1C MFR BLD: 5.7 % (ref ?–5.7)
HCT VFR BLD AUTO: 33.1 %
HDLC SERPL-MCNC: 60 MG/DL (ref 40–59)
HGB BLD-MCNC: 9.2 G/DL
IMM GRANULOCYTES # BLD AUTO: 0.01 X10(3) UL (ref 0–1)
IMM GRANULOCYTES NFR BLD: 0.2 %
LDLC SERPL CALC-MCNC: 18 MG/DL (ref ?–100)
LYMPHOCYTES # BLD AUTO: 0.87 X10(3) UL (ref 1–4)
LYMPHOCYTES NFR BLD AUTO: 13.9 %
MCH RBC QN AUTO: 24.9 PG (ref 26–34)
MCHC RBC AUTO-ENTMCNC: 27.8 G/DL (ref 31–37)
MCV RBC AUTO: 89.5 FL
MONOCYTES # BLD AUTO: 0.46 X10(3) UL (ref 0.1–1)
MONOCYTES NFR BLD AUTO: 7.3 %
NEUTROPHILS # BLD AUTO: 4.48 X10 (3) UL (ref 1.5–7.7)
NEUTROPHILS # BLD AUTO: 4.48 X10(3) UL (ref 1.5–7.7)
NEUTROPHILS NFR BLD AUTO: 71.6 %
NONHDLC SERPL-MCNC: 33 MG/DL (ref ?–130)
OSMOLALITY SERPL CALC.SUM OF ELEC: 304 MOSM/KG (ref 275–295)
PLATELET # BLD AUTO: 297 10(3)UL (ref 150–450)
POTASSIUM SERPL-SCNC: 5.2 MMOL/L (ref 3.5–5.1)
PROT SERPL-MCNC: 6 G/DL (ref 6.4–8.2)
RBC # BLD AUTO: 3.7 X10(6)UL
SODIUM SERPL-SCNC: 145 MMOL/L (ref 136–145)
T4 FREE SERPL-MCNC: 1.4 NG/DL (ref 0.8–1.7)
TRIGL SERPL-MCNC: 72 MG/DL (ref 30–149)
TSI SER-ACNC: 1.38 MIU/ML (ref 0.36–3.74)
VLDLC SERPL CALC-MCNC: 9 MG/DL (ref 0–30)
WBC # BLD AUTO: 6.3 X10(3) UL (ref 4–11)

## 2023-01-10 PROCEDURE — 83036 HEMOGLOBIN GLYCOSYLATED A1C: CPT

## 2023-01-10 PROCEDURE — 84439 ASSAY OF FREE THYROXINE: CPT

## 2023-01-10 PROCEDURE — 85025 COMPLETE CBC W/AUTO DIFF WBC: CPT

## 2023-01-10 PROCEDURE — 84443 ASSAY THYROID STIM HORMONE: CPT

## 2023-01-10 PROCEDURE — 80061 LIPID PANEL: CPT

## 2023-01-10 PROCEDURE — 80053 COMPREHEN METABOLIC PANEL: CPT

## 2023-01-10 PROCEDURE — 99213 OFFICE O/P EST LOW 20 MIN: CPT | Performed by: NURSE PRACTITIONER

## 2023-01-10 RX ORDER — CLOPIDOGREL BISULFATE 75 MG/1
75 TABLET ORAL DAILY
COMMUNITY
Start: 2022-12-08

## 2023-01-31 ENCOUNTER — OFFICE VISIT (OUTPATIENT)
Dept: FAMILY MEDICINE CLINIC | Facility: CLINIC | Age: 83
End: 2023-01-31
Payer: MEDICARE

## 2023-01-31 VITALS
HEIGHT: 64 IN | HEART RATE: 64 BPM | SYSTOLIC BLOOD PRESSURE: 128 MMHG | WEIGHT: 200 LBS | BODY MASS INDEX: 34.15 KG/M2 | DIASTOLIC BLOOD PRESSURE: 62 MMHG | TEMPERATURE: 98 F | RESPIRATION RATE: 16 BRPM

## 2023-01-31 DIAGNOSIS — E11.9 TYPE 2 DIABETES MELLITUS WITHOUT COMPLICATION, WITHOUT LONG-TERM CURRENT USE OF INSULIN (HCC): Primary | ICD-10-CM

## 2023-01-31 DIAGNOSIS — N18.30 ANEMIA DUE TO STAGE 3 CHRONIC KIDNEY DISEASE, UNSPECIFIED WHETHER STAGE 3A OR 3B CKD (HCC): ICD-10-CM

## 2023-01-31 DIAGNOSIS — Z86.73 HISTORY OF STROKE: ICD-10-CM

## 2023-01-31 DIAGNOSIS — E87.5 HYPERKALEMIA: ICD-10-CM

## 2023-01-31 DIAGNOSIS — F41.9 ANXIETY AND DEPRESSION: ICD-10-CM

## 2023-01-31 DIAGNOSIS — D63.1 ANEMIA DUE TO STAGE 3 CHRONIC KIDNEY DISEASE, UNSPECIFIED WHETHER STAGE 3A OR 3B CKD (HCC): ICD-10-CM

## 2023-01-31 DIAGNOSIS — E78.2 MIXED HYPERLIPIDEMIA: ICD-10-CM

## 2023-01-31 DIAGNOSIS — I25.10 CORONARY ARTERY DISEASE INVOLVING NATIVE CORONARY ARTERY OF NATIVE HEART WITHOUT ANGINA PECTORIS: ICD-10-CM

## 2023-01-31 DIAGNOSIS — E03.9 HYPOTHYROIDISM, UNSPECIFIED TYPE: ICD-10-CM

## 2023-01-31 DIAGNOSIS — F32.A ANXIETY AND DEPRESSION: ICD-10-CM

## 2023-01-31 PROCEDURE — 99214 OFFICE O/P EST MOD 30 MIN: CPT | Performed by: FAMILY MEDICINE

## 2023-01-31 RX ORDER — LORAZEPAM 0.5 MG/1
0.5 TABLET ORAL DAILY PRN
Qty: 30 TABLET | Refills: 0 | Status: SHIPPED | OUTPATIENT
Start: 2023-01-31

## 2023-01-31 NOTE — PATIENT INSTRUCTIONS
Schedule appointment with ophthalmologist for diabetic eye exam--- or one of his partners.   [address] [place] [address]: [phone]

## 2023-02-01 ENCOUNTER — LAB ENCOUNTER (OUTPATIENT)
Dept: LAB | Age: 83
End: 2023-02-01
Attending: FAMILY MEDICINE
Payer: MEDICARE

## 2023-02-01 DIAGNOSIS — N18.30 ANEMIA DUE TO STAGE 3 CHRONIC KIDNEY DISEASE, UNSPECIFIED WHETHER STAGE 3A OR 3B CKD (HCC): ICD-10-CM

## 2023-02-01 DIAGNOSIS — F32.A ANXIETY AND DEPRESSION: ICD-10-CM

## 2023-02-01 DIAGNOSIS — E11.9 TYPE 2 DIABETES MELLITUS WITHOUT COMPLICATION, WITHOUT LONG-TERM CURRENT USE OF INSULIN (HCC): ICD-10-CM

## 2023-02-01 DIAGNOSIS — E87.5 HYPERKALEMIA: ICD-10-CM

## 2023-02-01 DIAGNOSIS — F41.9 ANXIETY AND DEPRESSION: ICD-10-CM

## 2023-02-01 DIAGNOSIS — D63.1 ANEMIA DUE TO STAGE 3 CHRONIC KIDNEY DISEASE, UNSPECIFIED WHETHER STAGE 3A OR 3B CKD (HCC): ICD-10-CM

## 2023-02-01 LAB
ALBUMIN SERPL-MCNC: 2.7 G/DL (ref 3.4–5)
ALBUMIN/GLOB SERPL: 0.9 {RATIO} (ref 1–2)
ALP LIVER SERPL-CCNC: 120 U/L
ALT SERPL-CCNC: 9 U/L
ANION GAP SERPL CALC-SCNC: 4 MMOL/L (ref 0–18)
AST SERPL-CCNC: 11 U/L (ref 15–37)
BASOPHILS # BLD AUTO: 0.03 X10(3) UL (ref 0–0.2)
BASOPHILS NFR BLD AUTO: 0.6 %
BILIRUB SERPL-MCNC: 0.4 MG/DL (ref 0.1–2)
BUN BLD-MCNC: 20 MG/DL (ref 7–18)
BUN/CREAT SERPL: 16.9 (ref 10–20)
CALCIUM BLD-MCNC: 7.6 MG/DL (ref 8.5–10.1)
CHLORIDE SERPL-SCNC: 112 MMOL/L (ref 98–112)
CO2 SERPL-SCNC: 29 MMOL/L (ref 21–32)
CREAT BLD-MCNC: 1.18 MG/DL
CREAT UR-SCNC: 118 MG/DL
DEPRECATED HBV CORE AB SER IA-ACNC: 11.2 NG/ML
DEPRECATED RDW RBC AUTO: 72.5 FL (ref 35.1–46.3)
EOSINOPHIL # BLD AUTO: 0.36 X10(3) UL (ref 0–0.7)
EOSINOPHIL NFR BLD AUTO: 6.6 %
ERYTHROCYTE [DISTWIDTH] IN BLOOD BY AUTOMATED COUNT: 22 % (ref 11–15)
FASTING STATUS PATIENT QL REPORTED: NO
GFR SERPLBLD BASED ON 1.73 SQ M-ARVRAT: 46 ML/MIN/1.73M2 (ref 60–?)
GLOBULIN PLAS-MCNC: 3.1 G/DL (ref 2.8–4.4)
GLUCOSE BLD-MCNC: 86 MG/DL (ref 70–99)
HCT VFR BLD AUTO: 33.9 %
HGB BLD-MCNC: 9.9 G/DL
IMM GRANULOCYTES # BLD AUTO: 0.02 X10(3) UL (ref 0–1)
IMM GRANULOCYTES NFR BLD: 0.4 %
IRON SATN MFR SERPL: 5 %
IRON SERPL-MCNC: 18 UG/DL
LYMPHOCYTES # BLD AUTO: 0.88 X10(3) UL (ref 1–4)
LYMPHOCYTES NFR BLD AUTO: 16.1 %
MCH RBC QN AUTO: 26.5 PG (ref 26–34)
MCHC RBC AUTO-ENTMCNC: 29.2 G/DL (ref 31–37)
MCV RBC AUTO: 90.9 FL
MICROALBUMIN UR-MCNC: 2.16 MG/DL
MICROALBUMIN/CREAT 24H UR-RTO: 18.3 UG/MG (ref ?–30)
MONOCYTES # BLD AUTO: 0.48 X10(3) UL (ref 0.1–1)
MONOCYTES NFR BLD AUTO: 8.8 %
NEUTROPHILS # BLD AUTO: 3.68 X10 (3) UL (ref 1.5–7.7)
NEUTROPHILS # BLD AUTO: 3.68 X10(3) UL (ref 1.5–7.7)
NEUTROPHILS NFR BLD AUTO: 67.5 %
OSMOLALITY SERPL CALC.SUM OF ELEC: 302 MOSM/KG (ref 275–295)
PLATELET # BLD AUTO: 282 10(3)UL (ref 150–450)
PLATELET MORPHOLOGY: NORMAL
POTASSIUM SERPL-SCNC: 5.4 MMOL/L (ref 3.5–5.1)
PROT SERPL-MCNC: 5.8 G/DL (ref 6.4–8.2)
RBC # BLD AUTO: 3.73 X10(6)UL
SODIUM SERPL-SCNC: 145 MMOL/L (ref 136–145)
TIBC SERPL-MCNC: 387 UG/DL (ref 240–450)
TRANSFERRIN SERPL-MCNC: 260 MG/DL (ref 200–360)
WBC # BLD AUTO: 5.5 X10(3) UL (ref 4–11)

## 2023-02-01 PROCEDURE — 85025 COMPLETE CBC W/AUTO DIFF WBC: CPT

## 2023-02-01 PROCEDURE — 84466 ASSAY OF TRANSFERRIN: CPT

## 2023-02-01 PROCEDURE — 80053 COMPREHEN METABOLIC PANEL: CPT

## 2023-02-01 PROCEDURE — 82728 ASSAY OF FERRITIN: CPT

## 2023-02-01 PROCEDURE — 82570 ASSAY OF URINE CREATININE: CPT

## 2023-02-01 PROCEDURE — 82043 UR ALBUMIN QUANTITATIVE: CPT

## 2023-02-01 PROCEDURE — 83540 ASSAY OF IRON: CPT

## 2023-02-20 ENCOUNTER — TELEPHONE (OUTPATIENT)
Dept: FAMILY MEDICINE CLINIC | Facility: CLINIC | Age: 83
End: 2023-02-20

## 2023-02-20 NOTE — TELEPHONE ENCOUNTER
Pt spouse requesting to s/w nurse regarding pt's symptoms    States pt has had sore throat ongoing 3 days    No other symptoms    No Covid test    Spouse would like to discuss medication options. Please contact pt spouse to discuss further, thank you.

## 2023-02-25 DIAGNOSIS — E03.9 HYPOTHYROIDISM, UNSPECIFIED TYPE: ICD-10-CM

## 2023-02-25 RX ORDER — LEVOTHYROXINE SODIUM 0.07 MG/1
75 TABLET ORAL
Qty: 90 TABLET | Refills: 1 | Status: SHIPPED | OUTPATIENT
Start: 2023-02-25

## 2023-04-25 ENCOUNTER — OFFICE VISIT (OUTPATIENT)
Dept: NEUROLOGY | Facility: CLINIC | Age: 83
End: 2023-04-25
Payer: MEDICARE

## 2023-04-25 VITALS
HEART RATE: 80 BPM | OXYGEN SATURATION: 97 % | DIASTOLIC BLOOD PRESSURE: 78 MMHG | RESPIRATION RATE: 16 BRPM | WEIGHT: 199.38 LBS | BODY MASS INDEX: 34 KG/M2 | SYSTOLIC BLOOD PRESSURE: 126 MMHG

## 2023-04-25 DIAGNOSIS — Z86.73 HISTORY OF STROKE: Primary | ICD-10-CM

## 2023-04-25 PROCEDURE — 99213 OFFICE O/P EST LOW 20 MIN: CPT | Performed by: OTHER

## 2023-04-25 RX ORDER — ATORVASTATIN CALCIUM 40 MG/1
40 TABLET, FILM COATED ORAL NIGHTLY
Qty: 90 TABLET | Refills: 3 | Status: SHIPPED | OUTPATIENT
Start: 2023-04-25

## 2023-04-25 NOTE — PROGRESS NOTES
Patient states she has fallen a few time since last office visit. Patient states she hit her head during this process. Patient denies headaches. Patient states her speech has improved.

## 2023-05-03 ENCOUNTER — TELEPHONE (OUTPATIENT)
Dept: FAMILY MEDICINE CLINIC | Facility: CLINIC | Age: 83
End: 2023-05-03

## 2023-05-03 NOTE — TELEPHONE ENCOUNTER
Pt called and requested for referral for optometrist that the doctor suggested. Pt will like a call back once referral has been put in so she can schedule.

## 2023-05-03 NOTE — TELEPHONE ENCOUNTER
Pt informed that Dr. Binta Sigala has recommended [facility]. Dr. Sidra Lemons or one of his partners. Pt voiced understanding.     [facility]: [phone]

## 2023-06-09 ENCOUNTER — TELEPHONE (OUTPATIENT)
Dept: FAMILY MEDICINE CLINIC | Facility: CLINIC | Age: 83
End: 2023-06-09

## 2023-06-09 DIAGNOSIS — E03.9 HYPOTHYROIDISM, UNSPECIFIED TYPE: ICD-10-CM

## 2023-06-09 RX ORDER — LEVOTHYROXINE SODIUM 0.2 MG/1
200 TABLET ORAL
Qty: 30 TABLET | Refills: 0 | Status: SHIPPED | OUTPATIENT
Start: 2023-06-09

## 2023-06-09 NOTE — TELEPHONE ENCOUNTER
LOV 01/31/2023  Pt is due for an office visit. [facility] sent to pt to call and make an appt.

## 2023-06-09 NOTE — TELEPHONE ENCOUNTER
Pt requesting refill of LEVOTHYROXINE 200 MCG Oral Tab to [id number]- [place], [place] - [address] [facility], [phone], [phone]. Please send rx if appropriate, thank you!

## 2023-06-19 DIAGNOSIS — F41.9 ANXIETY AND DEPRESSION: ICD-10-CM

## 2023-06-19 DIAGNOSIS — F32.A ANXIETY AND DEPRESSION: ICD-10-CM

## 2023-06-19 RX ORDER — LORAZEPAM 0.5 MG/1
0.5 TABLET ORAL EVERY 6 HOURS PRN
Qty: 30 TABLET | Refills: 0 | Status: SHIPPED | OUTPATIENT
Start: 2023-06-19

## 2023-06-19 NOTE — PROGRESS NOTES
Called patient to see how she is doing after the recent passing of her . The services for [facility], [facility] are tomorrow. She was questioning whether we could call in some lorazepam for her to help her with her sleep and to help get her through the day tomorrow. I told her that would be very reasonable I will call it in for her. I asked her to call if there is anything else we can do for her.

## 2023-06-20 ENCOUNTER — TELEPHONE (OUTPATIENT)
Dept: FAMILY MEDICINE CLINIC | Facility: CLINIC | Age: 83
End: 2023-06-20

## 2023-06-20 NOTE — TELEPHONE ENCOUNTER
Left a message for the pt to call the office regarding the medication Lorazepam 0.5 mg. Pt medication needs a PA but the medication can be picked up with a [employer] coupon. 30 tabs $14.69  60 tabs $ 20.18  90 tabs $ 25.77    If she doesn't want to use the coupon, let us now so a PA can be started. (PA---Prior Authorization).

## 2023-06-21 NOTE — TELEPHONE ENCOUNTER
Pharmacy called. They are asking if we can send her med list to confirm what she is taking. Reports recent passing of spouse and is a little unclear on what she is taking.   I have faxed med list [phone]    As for lorazepam--they are running a discount card for pt, cost is less than $10

## 2023-07-05 DIAGNOSIS — Z86.73 HISTORY OF STROKE: Primary | ICD-10-CM

## 2023-07-05 RX ORDER — ASPIRIN 325 MG
325 TABLET, DELAYED RELEASE (ENTERIC COATED) ORAL DAILY
Qty: 90 TABLET | Refills: 1 | Status: SHIPPED | OUTPATIENT
Start: 2023-07-05

## 2023-07-05 NOTE — TELEPHONE ENCOUNTER
Medication: ASPIRIN 325 MG Oral Tab EC      Date of last refill: 11/10/2022 (#90/1)  [place]A     Last office visit: 04/25/2023  Due back to clinic per last office note:  Around 10/25/2023  Date next office visit scheduled:    Future Appointments   Date Time Provider Junie Burns   10/24/2023 10:00 AM DO MAICOL England [address] [place] [address] note recommendation:    Assessment: This is an 80 y/o female with recent L MCA stroke and aphasia. She is doing relatively well at this time She should continue her Aspirin and statin. Plan:  1. L MCA stroke  - Continue Atorvastatin 80mg PO at bedtime  - Continue Aspirin 325mg PO Qday  - Follow up with cardiology as scheduled  - LDL 66 on 10/1/22     Stroke Prevention  - Limit EtOH to no more than 2 drinks per day for men and 1 for women  - Follow  A mediterranean diet  - Abstain from tobacco products  - BP goals <140/90 optimally normotensive 120/80.  Use ACEI if possible  - LDL goal < 70

## 2023-07-19 ENCOUNTER — TELEPHONE (OUTPATIENT)
Dept: FAMILY MEDICINE CLINIC | Facility: CLINIC | Age: 83
End: 2023-07-19

## 2023-08-03 DIAGNOSIS — E03.9 HYPOTHYROIDISM, UNSPECIFIED TYPE: ICD-10-CM

## 2023-08-03 RX ORDER — LEVOTHYROXINE SODIUM 0.2 MG/1
200 TABLET ORAL
Qty: 30 TABLET | Refills: 0 | Status: SHIPPED | OUTPATIENT
Start: 2023-08-03

## 2023-08-11 ENCOUNTER — OFFICE VISIT (OUTPATIENT)
Dept: FAMILY MEDICINE CLINIC | Facility: CLINIC | Age: 83
End: 2023-08-11
Payer: MEDICARE

## 2023-08-11 VITALS
HEIGHT: 64 IN | HEART RATE: 78 BPM | TEMPERATURE: 98 F | RESPIRATION RATE: 16 BRPM | BODY MASS INDEX: 32.61 KG/M2 | OXYGEN SATURATION: 95 % | DIASTOLIC BLOOD PRESSURE: 74 MMHG | WEIGHT: 191 LBS | SYSTOLIC BLOOD PRESSURE: 138 MMHG

## 2023-08-11 DIAGNOSIS — H61.23 BILATERAL IMPACTED CERUMEN: Primary | ICD-10-CM

## 2023-08-11 PROCEDURE — 99213 OFFICE O/P EST LOW 20 MIN: CPT | Performed by: FAMILY MEDICINE

## 2023-08-17 PROBLEM — R77.8 ELEVATED TROPONIN: Status: ACTIVE | Noted: 2023-08-17
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
## 2023-08-17 PROBLEM — R79.89 ELEVATED TROPONIN: Status: ACTIVE | Noted: 2023-01-01

## 2023-08-17 PROBLEM — R77.8 ELEVATED TROPONIN: Status: ACTIVE | Noted: 2023-01-01

## 2023-08-17 PROBLEM — R53.1 WEAKNESS GENERALIZED: Status: ACTIVE | Noted: 2023-08-17
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
## 2023-08-17 PROBLEM — R53.1 WEAKNESS GENERALIZED: Status: ACTIVE | Noted: 2023-01-01

## 2023-08-17 NOTE — PLAN OF CARE
Notified by RN that patient has had several episodes of asymptomatic sinus bradycardia in the 40s. This is self-limited. Her Hr is currently back in the 76s. Will hold BB. Attempted to view strips electronically but unable. If has episode long enough, will obtain EKG.     JERRY Iraheta  8/17/2023  6:49 PM  [phone]

## 2023-08-17 NOTE — PLAN OF CARE
Received pt from ED approx 1645, pt AOX4, can be forgetful at times reports no pain. Some expressive aphasia noted, per daughter at bedside this is her normal since her stroke in October 2022, states it gets worse the more tired she is. Lung sounds diminished bilaterally, O2 saturation adequate on 2L NC. No complaints of shortness of breath or chest pain. NSR on tele, S1-S2 present, no adventitious sounds noted. Bowel sounds present and active in all quadrants, last BM this morning per pt. Patient with external catheter in place, reports severe weakness, unable to sit at side of bed with moderate assist. 2+ pitting edema to right lower extremity. 1+ pitting edema to left lower extremity. Bony prominences clean, dry, intact. Plan of Care: Serial troponin. IV fluids. Pysch liaison to see. Dietitian to see. US carotids with doppler. 2D echo. PT/OT to see. UA pending. 1840: Pt with asymptomatic bradycardia in 40s. Spoke with [facility], hold beta blocker, continue to monitor, if pt sustains bradycardia obtain EKG and notify cards. Discussed plan of care with patient, verbalized understanding. Call light within reach.

## 2023-08-17 NOTE — ED QUICK NOTES
Rounding Completed    Plan of Care reviewed. Waiting for urine. Elimination needs assessed. Provided updates. Bed is locked and in lowest position. Call light within reach.

## 2023-08-17 NOTE — ED INITIAL ASSESSMENT (HPI)
Pt fell yesterday and this morning. Pt hit head when she fell. Pt feels disoriented after fall. Pt feels weak on right leg x2 weeks. Pt only takes aspirin. Pt A/O x3. Hx stroke in October.

## 2023-08-17 NOTE — ED QUICK NOTES
Orders for admission, patient is aware of plan and ready to go upstairs. Any questions, please call ED RN Dora at extension [phone]. Patient Covid vaccination status: Fully vaccinated     COVID Test Ordered in ED: None    COVID Suspicion at Admission: N/A    Running Infusions:    sodium chloride 125 mL/hr (08/17/23 1241)        Mental Status/LOC at time of transport: A&Ox3    Other pertinent information: Mariaa Doyle in place. Glasses.    CIWA score: N/A   NIH score:  N/A

## 2023-08-17 NOTE — PROGRESS NOTES
08/17/23 1735   Clinical Encounter Type   Visited With Patient   Taxonomy   Intended Effects Promote a sense of peace   Methods Offer spiritual/Rastafari support   Interventions Active listening;Explain  role      respond to consult request for visit. Patient alert sitting up in bed eating dinner.  explained nature of visit. Patient showed appreciation for visit.  remains available for support. Spiritual Care support can be requested via an Epic consult. For urgent/immediate needs, please contact the On Call  at ext. [phone]. NERI Holman. Div  Extension:[phone]

## 2023-08-18 PROBLEM — N39.0 URINARY TRACT INFECTION WITHOUT HEMATURIA: Status: ACTIVE | Noted: 2023-01-01

## 2023-08-18 PROBLEM — N39.0 URINARY TRACT INFECTION WITHOUT HEMATURIA: Status: ACTIVE | Noted: 2023-08-18

## 2023-08-18 NOTE — PHYSICAL THERAPY NOTE
PHYSICAL THERAPY EVALUATION - INPATIENT     Room Number: 1477/3298-Q  Evaluation Date: 8/18/2023  Type of Evaluation: Initial  Physician Order: PT Eval and Treat    Presenting Problem: s/p fall, hit head, disoriented now, weakness in R leg  Co-Morbidities : HTN, OA, CAD, CVA, DVT, CKD stage III  Reason for Therapy: Mobility Dysfunction and Discharge Planning    History related to current admission: Patient is a 81year old female admitted on 8/17/2023 from home for fall, disorientation, weakness. Pt diagnosed with weakness, dehydration, possible UTI, elevated troponin. Pt with bouts of asymptomatic bradycarida in the 40s, per chart review. Brain CT negative for any acute findings. [facility] [address] [facility] carotid completed 8/18. US carotid doppler 8/18  CONCLUSION:    1. Plaque causes 50-69% stenosis of the proximal left internal carotid artery. 2. Plaque of visualized right extracranial carotid artery system causes less than 50% stenosis. 3. Antegrade flow in both vertebral arteries. ASSESSMENT   In this PT evaluation, the patient presents with the following impairments decr activity tolerance/endurance, decr balance, global decr in strength, decr in functional mobility. These impairments and comorbidities manifest themselves as functional limitations in independent bed mobility, transfers, and gait. The patient is below baseline and would benefit from skilled inpatient PT to address the above deficits to assist patient in returning to prior to level of function. Functional outcome measures completed include AMPAC. The AM-PAC '6-Clicks' Inpatient Basic Mobility Short Form was completed and this patient is demonstrating a Approx Degree of Impairment: 50.57%  degree of impairment in mobility. Research supports that patients with this level of impairment may benefit from DENNISE. DISCHARGE RECOMMENDATIONS  PT Discharge Recommendations: Sub-acute rehabilitation    PLAN  PT Treatment Plan: Bed mobility; Body mechanics; Endurance; Energy conservation; Family education;Patient education;Gait training;Neuromuscular re-educate;Range of motion;Strengthening;Stoop training;Transfer training;Stair training;Balance training  Rehab Potential : Good  Frequency (Obs): 3-5x/week  Number of Visits to Meet Established Goals: 4      CURRENT GOALS    Goal #1 Patient is able to demonstrate supine - sit EOB @ level: modified independent     Goal #2 Patient is able to demonstrate transfers Sit to/from Stand at assistance level: modified independent     Goal #3 Patient is able to ambulate 25 feet with assist device: walker - rolling at assistance level: modified independent     Goal #4    Goal #5    Goal #6    Goal Comments: Goals established on 2023    HOME SITUATION  Type of Home: House   Home Layout: Multi-level;Bed/bath upstairs  Stairs to Enter : 2  Railing: Yes  Stairs to Bedroom: 7  Railing: Yes    Lives With: Alone  Drives: Yes  Patient Owned Equipment: Rolling walker  Patient Regularly Uses: None    Prior Level of [place]: Per pt- lives alone in multi level home. Two steps to enter, and 7 steps to reach main floor. Ambulates with RW. Has family near by, but  they are unable to stay with her.  just passed in .     SUBJECTIVE  \"      OBJECTIVE  Precautions: Bed/chair alarm  Fall Risk: High fall risk    WEIGHT BEARING RESTRICTION  Weight Bearing Restriction: None                PAIN ASSESSMENT  Ratin  Location: denies       COGNITION  Following Commands:  follows one step commands with increased time and follows one step commands with repetition  Safety Judgement:  decreased awareness of need for safety  Awareness of Errors:  assistance required to identify errors made, assistance required to correct errors made, and decreased awareness of errors   Problem Solving:  assistance required to identify errors made, assistance required to generate solutions, and assistance required to implement solutions    RANGE OF MOTION AND STRENGTH ASSESSMENT  Upper extremity ROM and strength are within functional limits     Lower extremity ROM is within functional limits     Lower extremity strength is within functional limits       BALANCE  Static Sitting: Fair  Dynamic Sitting: Fair -  Static Standing: Fair -  Dynamic Standing: Poor +    ADDITIONAL TESTS                                    ACTIVITY TOLERANCE                         O2 WALK       NEUROLOGICAL FINDINGS                        AM-PAC '6-Clicks' INPATIENT SHORT FORM - BASIC MOBILITY  How much difficulty does the patient currently have. .. Patient Difficulty: Turning over in bed (including adjusting bedclothes, sheets and blankets)?: A Little   Patient Difficulty: Sitting down on and standing up from a chair with arms (e.g., wheelchair, bedside commode, etc.): A Little   Patient Difficulty: Moving from lying on back to sitting on the side of the bed?: A Little   How much help from another person does the patient currently need. .. Help from Another: Moving to and from a bed to a chair (including a wheelchair)?: A Little   Help from Another: Need to walk in hospital room?: A Little   Help from Another: Climbing 3-5 steps with a railing?: A Lot       AM-PAC Score:  Raw Score: 17   Approx Degree of Impairment: 50.57%   Standardized Score (AM-PAC Scale): 42.13   CMS Modifier (G-Code): CK    FUNCTIONAL ABILITY STATUS  Gait Assessment   Functional Mobility/Gait Assessment  Gait Assistance: Minimum assistance  Distance (ft): 7  Assistive Device: Rolling walker  Pattern:  (decr joe, shuffle gait pattern, flexed posture)    Skilled Therapy Provided     Bed Mobility:  Rolling: Carlo - incr reliance through bed rail   Supine to sit: Carlo   Sit to supine: NT     Transfer Mobility:  Sit to stand: CGA to RW    Stand to sit: CGA  Gait = Carlo w/ RW x 7 feet.  Decr joe, shuffle gait pattern, flexed posture     Therapist's Comments: Patient presents sitting up in bed finishing breakfast. Discussed role and goal of physical therapy in hospital setting. Pt in agreement to participate. Pt saturating in 90%s on room air throughout entirety of session, but pulse ox not giving a great reading. With [facility] minimally elevated, pt able to partially complete supine to sit, got stuck in the transition from side lying to sitting, unable to dig through elbow and push via UE to reach sitting position therefore she laid back down. Rachna to complete bed mobility. Once sitting EOB, able to scoot self independently to reach foot flat position. Sit to stand to RW at [facility], incr reliance of UE on RW and trunk flexed onto walker, requiring incr time to complete task. Ambulated to bathroom with RW at [facility]. Completed toileting- see OT note for specifics. Ambulated back to bedside chair once completed. Stand to sit at [facility]. Pt noting how weak and fatigued she feels after completing tasks. Pt upright in chair at end of session. Pt will continue to benefit from IP PT interventions. Upon discharge, recommendation to DENNISE to maximize patient's independence with functional mobility. Exercise/Education Provided:  Bed mobility  Body mechanics  Energy conservation  Functional activity tolerated  Gait training  Transfer training    Patient End of Session: Up in chair;Needs met;Call light within reach;RN aware of session/findings; All patient questions and concerns addressed; Alarm set; Discussed recommendations with /      Patient Evaluation Complexity Level:  History Moderate - 1 or 2 personal factors and/or co-morbidities   Examination of body systems Low - addressing 1-2 elements   Clinical Presentation Low - Stable   Clinical Decision Making Low - Stable       PT Session Time: 30 minutes  Gait Training:  minutes  Therapeutic Activity:  10 minutes  Neuromuscular Re-education:  minutes  Therapeutic Exercise:  minutes

## 2023-08-18 NOTE — PLAN OF CARE
RN Shift Note:    Assumed care of patient at 1. Patient off the unit for carotid ultrasound from [id number]. Shift Assessment:  Patient is alert and oriented to person, place, and situation. Disoriented to time. Difficulty recalling current date and unable to identify correct president. Slurred speech, expressive aphasia, and delayed responses noted upon assessment- baseline d/t stroke residuals. Confirmed this is pt's actual baseline with patient's daughter Brendan Cerna over telephone call. Pt following commands. RN dysphagia screen completed and pt passed, able to swallow without issue. Remains on 2L o2 nasal cannula for comfort, adequate oxygen saturations (baseline at home is room air). Dyspnea on exertion and with position changes. dyspnea developed more recently over past few weeks, worsening over past few days according to daughter. Normal sinus rhythm, heart rates occasionally dip down to low 40s but do not sustain. Subcutaneous heparin/SCDs for VTE prophylaxis. Swelling to BLE. Pt denies cardiac symptoms, denies chest pain. Purewick/brief in place for incontinence. Skin assessment completed with Florentino Garvey PCT (see flowsheets). Pt denies pain. Turns side to side in bed with x2 max assist. Patient and patient's daughter updated and agree with plan of care.      Plan of Care:  -Continuous IVF  -IV abx for +UA results; urine cultures pending  -Ultrasound of carotids- results pending  -Echo in AM  -AM labs  -consults to see- Nutrition and Psych Liaison   - PT eval for discharge recommendations    Problem: Patient/Family Goals  Goal: Patient/Family Long Term Goal  Description: Patient's Long Term Goal: \"get strong and be healthy again\"    Interventions:  - Follow up appointments after discharge(PCP, Cardiology)  -Adhere to medication regimen; medication compliance  -Adhere to healthy diet; adequate fluid intake  -Activity as tolerated  -PT/OT evaluation for discharge recommendations   - See additional Care Plan goals for specific interventions  Outcome: Progressing  Goal: Patient/Family Short Term Goal  Description: Patient's Short Term Goal: \"Feel better\"    Interventions:   - Telemetry monitoring  - Continuous IV fluids  - IV antibiotics, urine cultures pending  -2D echocardiogram  -Consults- Cardiology, Dietician, Psych Liaison, PT/OT  -Labs  -Vitals q 4hrs  - See additional Care Plan goals for specific interventions  Outcome: Progressing

## 2023-08-18 NOTE — CM/SW NOTE
08/18/23 1000   CM/SW Referral Data   Referral Source    Reason for Referral Discharge planning   Informant Patient;Daughter   Patient Info   Patient's Current Mental Status at Time of Assessment Alert;Oriented   Patient's [address]   Number of Levels in Home 2   Patient lives with Alone   Patient Status Prior to Admission   Independent with ADLs and Mobility No   Pt. requires assistance with Driving   Services in place prior to admission DME/Supplies at home   Type of DME/Supplies Megha Sanchez   Discharge Needs   Anticipated D/C needs Subacute rehab   Services Requested   PASRR Level 1 Submitted Yes   Choice of Post-Acute Provider   Informed patient of right to choose their preferred provider Yes     CM self referred to case for discharge planning and eval.    Pt is a 81year old female admitted s/p mechanical fall at home, generalized weakness, dehydration,  Pt is recently  and lives alone. Met w/patient and called daughter Lyndsey Ibarra w/pt permission for eval.    Pt reports that since her  passed away, she hasn't been doing well at home. Reports weakness, falls at home, reports she takes her medications as directed \"most of the time\"  She ambulates with a walker, does not drive, reports she sees her daughter, Lyndsey Ibarra daily. Pt reports they are in the process of searching for [facility]    Discussed w/pt and daughter that PT/OT recommending DENNISE and that in order for this to be covered by Medicare, pt would need a medically necessary 3 midnight stay in the hospital.  Notified them that if pt medically cleared before 3 midnights, options would be for respite stay (private pay room and board) with therapy covered under Medicare B or Home with Home Health and 24 hour supervision. 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       Offered caregiver list and contact info for [facility] liaison and emailed to her at [email or web address] referral via Marleein and Juan Alberto Jama referral as backup    Addendum 1530   Met with pt and daughter at the bedside and provided list of [facility] w/quality data    / to remain available for support and/or discharge planning.      Justice Abraham MBA MSN, RN CTL/  [id number]

## 2023-08-19 PROBLEM — R09.02 HYPOXIA: Status: ACTIVE | Noted: 2023-01-01

## 2023-08-19 PROBLEM — R09.02 HYPOXIA: Status: ACTIVE | Noted: 2023-08-19

## 2023-08-19 NOTE — PLAN OF CARE
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, up x1 and walker  1 L NC  NSR, no cardiac symptoms  Pt denies any pain  IV Rocephin q 24  Continent of bladder- increased frequency, continent of bowel  Fall precautions in place, bed alarm on  Pt updated on plan of care, all needs met at this time      Problem: Patient/Family Goals  Goal: Patient/Family Long Term Goal  Description: Patient's Long Term Goal: \"get strong and be healthy again\"    Interventions:  - Follow up appointments after discharge(PCP, Cardiology)  -Adhere to medication regimen; medication compliance  -Adhere to healthy diet; adequate fluid intake  -Activity as tolerated  -PT/OT evaluation for discharge recommendations   - See additional Care Plan goals for specific interventions  Outcome: Progressing  Goal: Patient/Family Short Term Goal  Description: Patient's Short Term Goal: \"Feel better\"    Interventions:   - Telemetry monitoring  - Continuous IV fluids  - IV antibiotics, urine cultures pending  -2D echocardiogram  -Consults- Cardiology, Dietician, Psych Liaison, PT/OT  -Labs  -Vitals q 4hrs  - See additional Care Plan goals for specific interventions  Outcome: Progressing     Problem: RESPIRATORY - ADULT  Goal: Achieves optimal ventilation and oxygenation  Description: INTERVENTIONS:  - Assess for changes in respiratory status  - Assess for changes in mentation and behavior  - Position to facilitate oxygenation and minimize respiratory effort  - Oxygen supplementation based on oxygen saturation or ABGs  - Provide Smoking Cessation handout, if applicable  - Encourage broncho-pulmonary hygiene including cough, deep breathe, Incentive Spirometry  - Assess the need for suctioning and perform as needed  - Assess and instruct to report SOB or any respiratory difficulty  - Respiratory Therapy support as indicated  - Manage/alleviate anxiety  - Monitor for signs/symptoms of CO2 retention  Outcome: Progressing Problem: CARDIOVASCULAR - ADULT  Goal: Maintains optimal cardiac output and hemodynamic stability  Description: INTERVENTIONS:  - Monitor vital signs, rhythm, and trends  - Monitor for bleeding, hypotension and signs of decreased cardiac output  - Evaluate effectiveness of vasoactive medications to optimize hemodynamic stability  - Monitor arterial and/or venous puncture sites for bleeding and/or hematoma  - Assess quality of pulses, skin color and temperature  - Assess for signs of decreased coronary artery perfusion - ex.  Angina  - Evaluate fluid balance, assess for edema, trend weights  Outcome: Progressing  Goal: Absence of cardiac arrhythmias or at baseline  Description: INTERVENTIONS:  - Continuous cardiac monitoring, monitor vital signs, obtain 12 lead EKG if indicated  - Evaluate effectiveness of antiarrhythmic and heart rate control medications as ordered  - Initiate emergency measures for life threatening arrhythmias  - Monitor electrolytes and administer replacement therapy as ordered  Outcome: Progressing

## 2023-08-19 NOTE — PLAN OF CARE
A/o x4, can be forgetful at times. 1L O2 while in room, went for walk in hallway which required up to 6L. Quickly recovered once resting in room, back to 1L. IV lasix given x1 per order. Appears to be voiding well. Walking to restroom. Continent. Soft stool but not watery in appearance and only 1 BM during this shift. SCD's on. Up in chair for most of the day. No complaints of pain. One assist with walker. Updated on plan of care, all questions answered. Call light in reach, alarms on for safety. POA paperwork placed in chart. Family updated on POC.      Problem: Patient/Family Goals  Goal: Patient/Family Long Term Goal  Description: Patient's Long Term Goal: \"get strong and be healthy again\"    Interventions:  - Follow up appointments after discharge(PCP, Cardiology)  -Adhere to medication regimen; medication compliance  -Adhere to healthy diet; adequate fluid intake  -Activity as tolerated  -PT/OT evaluation for discharge recommendations   - See additional Care Plan goals for specific interventions  Outcome: Progressing  Goal: Patient/Family Short Term Goal  Description: Patient's Short Term Goal: \"Feel better\"    Interventions:   - Telemetry monitoring  - Continuous IV fluids  - IV antibiotics, urine cultures pending  -2D echocardiogram  -Consults- Cardiology, Dietician, Psych Liaison, PT/OT  -Labs  -Vitals q 4hrs  - See additional Care Plan goals for specific interventions  Outcome: Progressing     Problem: RESPIRATORY - ADULT  Goal: Achieves optimal ventilation and oxygenation  Description: INTERVENTIONS:  - Assess for changes in respiratory status  - Assess for changes in mentation and behavior  - Position to facilitate oxygenation and minimize respiratory effort  - Oxygen supplementation based on oxygen saturation or ABGs  - Provide Smoking Cessation handout, if applicable  - Encourage broncho-pulmonary hygiene including cough, deep breathe, Incentive Spirometry  - Assess the need for suctioning and perform as needed  - Assess and instruct to report SOB or any respiratory difficulty  - Respiratory Therapy support as indicated  - Manage/alleviate anxiety  - Monitor for signs/symptoms of CO2 retention  Outcome: Progressing     Problem: CARDIOVASCULAR - ADULT  Goal: Maintains optimal cardiac output and hemodynamic stability  Description: INTERVENTIONS:  - Monitor vital signs, rhythm, and trends  - Monitor for bleeding, hypotension and signs of decreased cardiac output  - Evaluate effectiveness of vasoactive medications to optimize hemodynamic stability  - Monitor arterial and/or venous puncture sites for bleeding and/or hematoma  - Assess quality of pulses, skin color and temperature  - Assess for signs of decreased coronary artery perfusion - ex.  Angina  - Evaluate fluid balance, assess for edema, trend weights  Outcome: Progressing  Goal: Absence of cardiac arrhythmias or at baseline  Description: INTERVENTIONS:  - Continuous cardiac monitoring, monitor vital signs, obtain 12 lead EKG if indicated  - Evaluate effectiveness of antiarrhythmic and heart rate control medications as ordered  - Initiate emergency measures for life threatening arrhythmias  - Monitor electrolytes and administer replacement therapy as ordered  Outcome: Progressing

## 2023-08-19 NOTE — PLAN OF CARE
A/o x4. VSS. Placed on 1L via nasal canula for periodic desaturating at rest down to 82% on room air. Saturations stable >95% on 1 L. Shortness of breath with exertion. MD pagechon, fluids stopped. Pt able to ambulated with assistance, gait belt, walker, to bathroom. Continent and voiding. C/o of \"feeling weak\". No complaints of pain. Bed in lowest position, call light in reach. Daughter at bedside for most of the day. All updated on plan of care. All questions answered. Problem: RESPIRATORY - ADULT  Goal: Achieves optimal ventilation and oxygenation  Description: INTERVENTIONS:  - Assess for changes in respiratory status  - Assess for changes in mentation and behavior  - Position to facilitate oxygenation and minimize respiratory effort  - Oxygen supplementation based on oxygen saturation or ABGs  - Provide Smoking Cessation handout, if applicable  - Encourage broncho-pulmonary hygiene including cough, deep breathe, Incentive Spirometry  - Assess the need for suctioning and perform as needed  - Assess and instruct to report SOB or any respiratory difficulty  - Respiratory Therapy support as indicated  - Manage/alleviate anxiety  - Monitor for signs/symptoms of CO2 retention  Outcome: Progressing     Problem: CARDIOVASCULAR - ADULT  Goal: Maintains optimal cardiac output and hemodynamic stability  Description: INTERVENTIONS:  - Monitor vital signs, rhythm, and trends  - Monitor for bleeding, hypotension and signs of decreased cardiac output  - Evaluate effectiveness of vasoactive medications to optimize hemodynamic stability  - Monitor arterial and/or venous puncture sites for bleeding and/or hematoma  - Assess quality of pulses, skin color and temperature  - Assess for signs of decreased coronary artery perfusion - ex.  Angina  - Evaluate fluid balance, assess for edema, trend weights  Outcome: Progressing  Goal: Absence of cardiac arrhythmias or at baseline  Description: INTERVENTIONS:  - Continuous cardiac monitoring, monitor vital signs, obtain 12 lead EKG if indicated  - Evaluate effectiveness of antiarrhythmic and heart rate control medications as ordered  - Initiate emergency measures for life threatening arrhythmias  - Monitor electrolytes and administer replacement therapy as ordered  Outcome: Progressing

## 2023-08-20 NOTE — PLAN OF CARE
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, up x1 and walker  1 L NC  NSR, no cardiac symptoms  Pt denies any pain  IV Rocephin q 24  Continent of bladder, continent of bowel  Fall precautions in place, bed alarm on  Pt updated on plan of care, all needs met at this time        Problem: Patient/Family Goals  Goal: Patient/Family Long Term Goal  Description: Patient's Long Term Goal: \"get strong and be healthy again\"    Interventions:  - Follow up appointments after discharge(PCP, Cardiology)  -Adhere to medication regimen; medication compliance  -Adhere to healthy diet; adequate fluid intake  -Activity as tolerated  -PT/OT evaluation for discharge recommendations   - See additional Care Plan goals for specific interventions  Outcome: Progressing  Goal: Patient/Family Short Term Goal  Description: Patient's Short Term Goal: \"Feel better\"    Interventions:   - Telemetry monitoring  - Continuous IV fluids  - IV antibiotics, urine cultures pending  -2D echocardiogram  -Consults- Cardiology, Dietician, Psych Liaison, PT/OT  -Labs  -Vitals q 4hrs  - See additional Care Plan goals for specific interventions  Outcome: Progressing     Problem: RESPIRATORY - ADULT  Goal: Achieves optimal ventilation and oxygenation  Description: INTERVENTIONS:  - Assess for changes in respiratory status  - Assess for changes in mentation and behavior  - Position to facilitate oxygenation and minimize respiratory effort  - Oxygen supplementation based on oxygen saturation or ABGs  - Provide Smoking Cessation handout, if applicable  - Encourage broncho-pulmonary hygiene including cough, deep breathe, Incentive Spirometry  - Assess the need for suctioning and perform as needed  - Assess and instruct to report SOB or any respiratory difficulty  - Respiratory Therapy support as indicated  - Manage/alleviate anxiety  - Monitor for signs/symptoms of CO2 retention  Outcome: Progressing     Problem: CARDIOVASCULAR - ADULT  Goal: Maintains optimal cardiac output and hemodynamic stability  Description: INTERVENTIONS:  - Monitor vital signs, rhythm, and trends  - Monitor for bleeding, hypotension and signs of decreased cardiac output  - Evaluate effectiveness of vasoactive medications to optimize hemodynamic stability  - Monitor arterial and/or venous puncture sites for bleeding and/or hematoma  - Assess quality of pulses, skin color and temperature  - Assess for signs of decreased coronary artery perfusion - ex.  Angina  - Evaluate fluid balance, assess for edema, trend weights  Outcome: Progressing  Goal: Absence of cardiac arrhythmias or at baseline  Description: INTERVENTIONS:  - Continuous cardiac monitoring, monitor vital signs, obtain 12 lead EKG if indicated  - Evaluate effectiveness of antiarrhythmic and heart rate control medications as ordered  - Initiate emergency measures for life threatening arrhythmias  - Monitor electrolytes and administer replacement therapy as ordered  Outcome: Progressing

## 2023-08-20 NOTE — PLAN OF CARE
A/o x4. Still periodically requiring 1L O2 while at rest. Walking in hallway desat to 79%, bumped up to 4L and quickly recovered. Rested in hallway for a minute, was able to turn O2 down to 1L for rest of the walk. Up in the chair for the day, working on IS, ambulating to the bathroom frequently. Chair locked and alarm placed. Bed in lowest position. Call light in reach. Family visiting and updated on plan of care. All questions answered.    Problem: Patient/Family Goals  Goal: Patient/Family Long Term Goal  Description: Patient's Long Term Goal: \"get strong and be healthy again\"    Interventions:  - Follow up appointments after discharge(PCP, Cardiology)  -Adhere to medication regimen; medication compliance  -Adhere to healthy diet; adequate fluid intake  -Activity as tolerated  -PT/OT evaluation for discharge recommendations   - See additional Care Plan goals for specific interventions  Outcome: Progressing  Goal: Patient/Family Short Term Goal  Description: Patient's Short Term Goal: \"Feel better\"    Interventions:   - Telemetry monitoring  - Continuous IV fluids  - IV antibiotics, urine cultures pending  -2D echocardiogram  -Consults- Cardiology, Dietician, Psych Liaison, PT/OT  -Labs  -Vitals q 4hrs  - See additional Care Plan goals for specific interventions  Outcome: Progressing     Problem: RESPIRATORY - ADULT  Goal: Achieves optimal ventilation and oxygenation  Description: INTERVENTIONS:  - Assess for changes in respiratory status  - Assess for changes in mentation and behavior  - Position to facilitate oxygenation and minimize respiratory effort  - Oxygen supplementation based on oxygen saturation or ABGs  - Provide Smoking Cessation handout, if applicable  - Encourage broncho-pulmonary hygiene including cough, deep breathe, Incentive Spirometry  - Assess the need for suctioning and perform as needed  - Assess and instruct to report SOB or any respiratory difficulty  - Respiratory Therapy support as indicated  - Manage/alleviate anxiety  - Monitor for signs/symptoms of CO2 retention  Outcome: Progressing     Problem: CARDIOVASCULAR - ADULT  Goal: Maintains optimal cardiac output and hemodynamic stability  Description: INTERVENTIONS:  - Monitor vital signs, rhythm, and trends  - Monitor for bleeding, hypotension and signs of decreased cardiac output  - Evaluate effectiveness of vasoactive medications to optimize hemodynamic stability  - Monitor arterial and/or venous puncture sites for bleeding and/or hematoma  - Assess quality of pulses, skin color and temperature  - Assess for signs of decreased coronary artery perfusion - ex.  Angina  - Evaluate fluid balance, assess for edema, trend weights  Outcome: Progressing  Goal: Absence of cardiac arrhythmias or at baseline  Description: INTERVENTIONS:  - Continuous cardiac monitoring, monitor vital signs, obtain 12 lead EKG if indicated  - Evaluate effectiveness of antiarrhythmic and heart rate control medications as ordered  - Initiate emergency measures for life threatening arrhythmias  - Monitor electrolytes and administer replacement therapy as ordered  Outcome: Progressing

## 2023-08-21 NOTE — PLAN OF CARE
Pt is  Alert and oriented x4. Tele rhythm NSR O2 saturation 98% on 1L Breath sounds clear/diminished. Bed is locked and in low position. Call light and personal items within reach. No C/O chest pain or shortness of breath. Pt voiding with no issue. Skin dry/Intact. Reviewed plan of care and patient verbalizes understanding.        POC: IV abx            Wean 02        Problem: RESPIRATORY - ADULT  Goal: Achieves optimal ventilation and oxygenation  Description: INTERVENTIONS:  - Assess for changes in respiratory status  - Assess for changes in mentation and behavior  - Position to facilitate oxygenation and minimize respiratory effort  - Oxygen supplementation based on oxygen saturation or ABGs  - Provide Smoking Cessation handout, if applicable  - Encourage broncho-pulmonary hygiene including cough, deep breathe, Incentive Spirometry  - Assess the need for suctioning and perform as needed  - Assess and instruct to report SOB or any respiratory difficulty  - Respiratory Therapy support as indicated  - Manage/alleviate anxiety  - Monitor for signs/symptoms of CO2 retention  Outcome: Progressing

## 2023-08-21 NOTE — PLAN OF CARE
Received patient at 0730. Patient alert and oriented x3-4, can be forgetful. Tele Rhythm NSR with BBB. O2 sats 100% on 2L NC. Lungs clear. Bed is locked and low position. Call light & personal belongings within reach. Denies chest pain at this time. Patient voiding WNL up with 1 and walker. Has small anus wound LARRY. Reviewed plan of care with patient and verbalized understanding. Received IV lasix this morning. DC planning for [facility] referrals out to [facility] in [place]. Cleared by hospitalist.    1600 : Patient with SOB with exertion , feels she's needs more Lasix, Dr. Maria Elena Marrufo called , IV Avelina Jeseon x1 and chest x ray ordered.      Problem: Patient/Family Goals  Goal: Patient/Family Long Term Goal  Description: Patient's Long Term Goal: \"get strong and be healthy again\"    Interventions:  - Follow up appointments after discharge(PCP, Cardiology)  -Adhere to medication regimen; medication compliance  -Adhere to healthy diet; adequate fluid intake  -Activity as tolerated  -PT/OT evaluation for discharge recommendations   - See additional Care Plan goals for specific interventions  Outcome: Progressing  Goal: Patient/Family Short Term Goal  Description: Patient's Short Term Goal: \"Feel better\"    Interventions:   - Telemetry monitoring  - Continuous IV fluids  - IV antibiotics, urine cultures pending  -2D echocardiogram  -Consults- Cardiology, Dietician, Psych Liaison, PT/OT  -Labs  -Vitals q 4hrs  - See additional Care Plan goals for specific interventions  Outcome: Progressing     Problem: RESPIRATORY - ADULT  Goal: Achieves optimal ventilation and oxygenation  Description: INTERVENTIONS:  - Assess for changes in respiratory status  - Assess for changes in mentation and behavior  - Position to facilitate oxygenation and minimize respiratory effort  - Oxygen supplementation based on oxygen saturation or ABGs  - Provide Smoking Cessation handout, if applicable  - Encourage broncho-pulmonary hygiene including cough, deep breathe, Incentive Spirometry  - Assess the need for suctioning and perform as needed  - Assess and instruct to report SOB or any respiratory difficulty  - Respiratory Therapy support as indicated  - Manage/alleviate anxiety  - Monitor for signs/symptoms of CO2 retention  Outcome: Progressing   Problem: CARDIOVASCULAR - ADULT  Goal: Maintains optimal cardiac output and hemodynamic stability  Description: INTERVENTIONS:  - Monitor vital signs, rhythm, and trends  - Monitor for bleeding, hypotension and signs of decreased cardiac output  - Evaluate effectiveness of vasoactive medications to optimize hemodynamic stability  - Monitor arterial and/or venous puncture sites for bleeding and/or hematoma  - Assess quality of pulses, skin color and temperature  - Assess for signs of decreased coronary artery perfusion - ex.  Angina  - Evaluate fluid balance, assess for edema, trend weights  Outcome: Progressing  Goal: Absence of cardiac arrhythmias or at baseline  Description: INTERVENTIONS:  - Continuous cardiac monitoring, monitor vital signs, obtain 12 lead EKG if indicated  - Evaluate effectiveness of antiarrhythmic and heart rate control medications as ordered  - Initiate emergency measures for life threatening arrhythmias  - Monitor electrolytes and administer replacement therapy as ordered  Outcome: Progressing

## 2023-08-21 NOTE — CM/SW NOTE
BLS set up for will call  in case pt is cleared for discharge. GERTRUDIS sent [place] a message. PCS form completed. RN and CM updated.      Nam Truong Report # [phone]    HAIR Romano, [facility]  Discharge Planner  [id number]

## 2023-08-21 NOTE — PHYSICAL THERAPY NOTE
PHYSICAL THERAPY TREATMENT NOTE - INPATIENT    Room Number: 0079/7618-N     Session: 1/4     Number of Visits to Meet Established Goals: 4    Presenting Problem: s/p fall, hit head, disoriented now, weakness in R leg  Co-Morbidities : HTN, OA, CAD, CVA, DVT, CKD stage III    History related to current admission: Patient is a 81year old female admitted on 8/17/2023 from home for fall, disorientation, weakness. Pt diagnosed with weakness, dehydration, possible UTI, elevated troponin. Pt with bouts of asymptomatic bradycarida in the 40s, per chart review. Brain CT negative for any acute findings. Had [address] carotid completed 8/18. US carotid doppler 8/18  CONCLUSION:    1. Plaque causes 50-69% stenosis of the proximal left internal carotid artery. 2. Plaque of visualized right extracranial carotid artery system causes less than 50% stenosis. 3. Antegrade flow in both vertebral arteries. ASSESSMENT     Pt seen this pm for treatment and demonstrating slow progress towards all goals. Pt cont to present c mod edema in BLE, poor/fair CP endurance, ROM, strength, gait and balance deficits. Pt also cont to require 1 person assist for all short distance mobility at this time. Pt will require further skilled IP PT services in order to maximize function. Rec DC to [facility] when medically appropriate prior to return to home. DISCHARGE RECOMMENDATIONS  PT Discharge Recommendations: Sub-acute rehabilitation     PLAN  PT Treatment Plan: Bed mobility; Body mechanics; Endurance; Energy conservation; Family education;Patient education;Gait training;Neuromuscular re-educate;Range of motion;Strengthening;Stoop training;Transfer training;Stair training;Balance training  Rehab Potential : Good  Frequency (Obs): 3-5x/week    CURRENT GOALS     Goal #1 Patient is able to demonstrate supine - sit EOB @ level: modified independent      Goal #2 Patient is able to demonstrate transfers Sit to/from Stand at assistance level: modified independent      Goal #3 Patient is able to ambulate 25 feet with assist device: walker - rolling at assistance level: modified independent      Goal #4     Goal #5     Goal #6     Goal Comments: Goals established on 8/18/2023    SUBJECTIVE  \"I am doing ok, just get tired\"    OBJECTIVE  Precautions: Bed/chair alarm    WEIGHT BEARING RESTRICTION  Weight Bearing Restriction: None                PAIN ASSESSMENT   Rating: Unable to rate  Location: B knee  Management Techniques: Activity promotion; Body mechanics;Repositioning    BALANCE                                                                                                                       Static Sitting: Fair +  Dynamic Sitting: Fair +           Static Standing: Poor +  Dynamic Standing: Poor +    ACTIVITY TOLERANCE                         O2 WALK         AM-PAC '6-Clicks' INPATIENT SHORT FORM - BASIC MOBILITY  How much difficulty does the patient currently have. .. Patient Difficulty: Turning over in bed (including adjusting bedclothes, sheets and blankets)?: A Little   Patient Difficulty: Sitting down on and standing up from a chair with arms (e.g., wheelchair, bedside commode, etc.): A Little   Patient Difficulty: Moving from lying on back to sitting on the side of the bed?: A Little   How much help from another person does the patient currently need. ..    Help from Another: Moving to and from a bed to a chair (including a wheelchair)?: A Little   Help from Another: Need to walk in hospital room?: A Little   Help from Another: Climbing 3-5 steps with a railing?: A Lot       AM-PAC Score:  Raw Score: 17   Approx Degree of Impairment: 50.57%   Standardized Score (AM-PAC Scale): 42.13   CMS Modifier (G-Code): CK    FUNCTIONAL ABILITY STATUS  Gait Assessment   Functional Mobility/Gait Assessment  Gait Assistance: Minimum assistance  Distance (ft): 50  Assistive Device: Rolling walker  Pattern: Shuffle;L Foot flat;R Foot flat;L Flexed knee;R Flexed knee;Comment (mild kyphosis)    Skilled Therapy Provided    Bed Mobility:  Rolling: NT   Supine<>Sit: NT   Sit<>Supine: NT     Transfer Mobility:  Sit<>Stand: min A   Stand<>Sit: min A   Gait: min A x50' c RW    Therapist's Comments: Pt presents to PT sitting up in [facility]. Pt remains on 1.5 L of O2 and sats in mid 90's. Pt reporting stamina deficits c mobility, however eager to participate. Sit/stand c min A and RW. Pt then able to amb 50' c RW and cont'd min A-c above deviations. Pt required to cease conversing during mobility d/t incr SOB, however when returned to sitting in her chair-sats were in low 90's while still on 1.5 L. After several min of rest, pt able to complete UE/LE therex. However short rest breaks in btw. Pt then remained in [facility] c BLE elev and chair alarm intact. RN aware of session. THERAPEUTIC EXERCISES  Lower Extremity Ankle pumps  Knee extension     Upper Extremity Elbow flex/ext and Shoulder flex/ext; shld shrugs     Position Sitting     Repetitions   10   Sets   1     Patient End of Session: Up in chair;Needs met;Call light within reach;RN aware of session/findings; All patient questions and concerns addressed; Alarm set    PT Session Time: 30 minutes  Gait Training: 15 minutes    Therapeutic Exercise: 10 minutes

## 2023-08-21 NOTE — CM/SW NOTE
GERTRUDIS met with pt at bedside, and spoke to daughter, Lindsay Seen, on the phone to get [facility] choice. Agreeable to [facility] at discharge. Mist[facility] Up in [place]. Updates sent as well. Pt on 1L O2 still, does not wear O2 at home. Await discharge clearance.  &  to remain available and supportive for discharge planning needs.     HAIR Rizzo, [facility]  Discharge Planner  [phone]

## 2023-08-22 ENCOUNTER — ANESTHESIA (OUTPATIENT)
Dept: CARDIOLOGY UNIT | Facility: HOSPITAL | Age: 83
End: 2023-08-22
Payer: MEDICARE

## 2023-08-22 ENCOUNTER — ANESTHESIA EVENT (OUTPATIENT)
Dept: CARDIOLOGY UNIT | Facility: HOSPITAL | Age: 83
End: 2023-08-22
Payer: MEDICARE

## 2023-08-22 ENCOUNTER — NURSE ONLY (OUTPATIENT)
Dept: ELECTROPHYSIOLOGY | Facility: HOSPITAL | Age: 83
End: 2023-08-22
Attending: NURSE PRACTITIONER
Payer: MEDICARE

## 2023-08-22 ENCOUNTER — NURSE ONLY (OUTPATIENT)
Dept: ELECTROPHYSIOLOGY | Facility: HOSPITAL | Age: 83
End: 2023-08-22
Attending: INTERNAL MEDICINE
Payer: MEDICARE

## 2023-08-22 PROBLEM — I63.9 CEREBROVASCULAR ACCIDENT (CVA) (HCC): Status: ACTIVE | Noted: 2023-08-22

## 2023-08-22 PROBLEM — I63.9 CEREBROVASCULAR ACCIDENT (CVA) (HCC): Status: ACTIVE | Noted: 2023-01-01

## 2023-08-22 PROBLEM — R41.89 UNRESPONSIVE: Status: ACTIVE | Noted: 2023-01-01

## 2023-08-22 PROBLEM — R41.89 UNRESPONSIVE: Status: ACTIVE | Noted: 2023-08-22

## 2023-08-22 NOTE — ANESTHESIA PROCEDURE NOTES
Airway  Date/Time: 8/22/2023 10:05 AM  Urgency: elective    Airway not difficult    General Information and Staff    Patient location during procedure: ICU  Anesthesiologist: Awa Walsh DO  Performed: anesthesiologist   Performed by: Awa Walsh DO  Authorized by: Awa Walsh DO      Indications and Patient Condition  Indications for airway management: airway protection  Spontaneous ventilation: present  Sedation level: deep  Preoxygenated: yes  Patient position: sniffing  Mask difficulty assessment: 1 - vent by mask    Final Airway Details  Final airway type: endotracheal airway      Successful airway: ETT  Cuffed: yes   Successful intubation technique: Video laryngoscopy  Endotracheal tube insertion site: oral  Blade: GlideScope  Blade size: #3  ETT size (mm): 7.0    Cormack-Lehane Classification: grade I - full view of glottis  Placement verified by: capnometry   Measured from: lips  ETT to lips (cm): 21  Number of attempts at approach: 1    Additional Comments  Emergent intubation for airway protection. Discussed with attending. Probable stroke. VSS. CXR checked. Color change & BL breath sounds.

## 2023-08-22 NOTE — RESPIRATORY THERAPY NOTE
CODE Blue called pt unresponsive with HR SPO2 100% RR 15 on 15L NRM Pt started moving oral suction moderate amount. 28FR NT placed L nare. Per Dr Rolf Drake called to intubate for airway protection. Pt intubated with a 7.0 22Lip + color change on capnography and bilateral diminished breath sounds. Pt taken to CT.

## 2023-08-22 NOTE — PROGRESS NOTES
08/22/23 1009   Clinical Encounter Type   Visited With Family; Health care provider   Taxonomy   Intended Effects Convey a calming presence   Methods Offer spiritual/Confucianist support;Collaborate with care team member   Interventions Acknowledge current situation; Active listening;Explain  role; Respond as  to a defined crisis event      responded to Code Blue. Family at bedside.  offered support to family outside patient room. Family advised no support needed at this time.  remains available for support. Spiritual Care support can be requested via an Epic consult. For urgent/immediate needs, please contact the On Call  at ext. [phone]. NERI Odonnell. Div  Extension:[phone]

## 2023-08-22 NOTE — PLAN OF CARE
Received patient at 0730. Patient alert and oriented to person, place. Speech is slurred , patient does have history of CVA & slurred speech. Tele Rhythm NSR. O2 sats 98% on 1L NC. Lungs clear. Bed is locked and low position. Call light & personal belongings within reach. Denies chest pain at this time. Patient voiding in pur wick. Patient tolerating ambulation with 1 and a walker. Skin- has hemorrhoid and bleeding at times with BM's. Reviewed plan of care with patient and verbalized understanding. This morning has EKG, EEG, Neuro consult , troponin lab, consult with cardiology had 8 beats of V tach on telemetry around 0200. Daughter Fatmata updated and son at bedside. 09:44: EEG tech was in patient room setting up EEG test, the tech came out and said he needed assistance from the nurses. This RN and Dr. Marietta Green went in to patient room and patient was unresponsive and foaming at the mouth and RRT was called. O2 sats dropped to 68%, patient did maintain pulse and  NSR on telemetry. RRT team w/ anesthesiologist arrived and prepared for intubation protocol. Son at bedside. Patient was transported intubated to CT with CTU. RT, and CCU team and was then transported to [address] [place] [address]. CTU nurse gave report to aKtie Cassidy CCU nurse.           Problem: Patient/Family Goals  Goal: Patient/Family Long Term Goal  Description: Patient's Long Term Goal: \"get strong and be healthy again\"    Interventions:  - Follow up appointments after discharge(PCP, Cardiology)  -Adhere to medication regimen; medication compliance  -Adhere to healthy diet; adequate fluid intake  -Activity as tolerated  -PT/OT evaluation for discharge recommendations   - See additional Care Plan goals for specific interventions  Outcome: Progressing  Goal: Patient/Family Short Term Goal  Description: Patient's Short Term Goal: \"Feel better\"    Interventions:   - Telemetry monitoring  - Continuous IV fluids  - IV antibiotics, urine cultures pending  -2D echocardiogram  -Consults- Cardiology, Dietician, Psych Liaison, PT/OT  -Labs  -Vitals q 4hrs  - See additional Care Plan goals for specific interventions  Outcome: Progressing     Problem: RESPIRATORY - ADULT  Goal: Achieves optimal ventilation and oxygenation  Description: INTERVENTIONS:  - Assess for changes in respiratory status  - Assess for changes in mentation and behavior  - Position to facilitate oxygenation and minimize respiratory effort  - Oxygen supplementation based on oxygen saturation or ABGs  - Provide Smoking Cessation handout, if applicable  - Encourage broncho-pulmonary hygiene including cough, deep breathe, Incentive Spirometry  - Assess the need for suctioning and perform as needed  - Assess and instruct to report SOB or any respiratory difficulty  - Respiratory Therapy support as indicated  - Manage/alleviate anxiety  - Monitor for signs/symptoms of CO2 retention  Outcome: Progressing     Problem: CARDIOVASCULAR - ADULT  Goal: Maintains optimal cardiac output and hemodynamic stability  Description: INTERVENTIONS:  - Monitor vital signs, rhythm, and trends  - Monitor for bleeding, hypotension and signs of decreased cardiac output  - Evaluate effectiveness of vasoactive medications to optimize hemodynamic stability  - Monitor arterial and/or venous puncture sites for bleeding and/or hematoma  - Assess quality of pulses, skin color and temperature  - Assess for signs of decreased coronary artery perfusion - ex.  Angina  - Evaluate fluid balance, assess for edema, trend weights  Outcome: Progressing  Goal: Absence of cardiac arrhythmias or at baseline  Description: INTERVENTIONS:  - Continuous cardiac monitoring, monitor vital signs, obtain 12 lead EKG if indicated  - Evaluate effectiveness of antiarrhythmic and heart rate control medications as ordered  - Initiate emergency measures for life threatening arrhythmias  - Monitor electrolytes and administer replacement therapy as ordered  Outcome: Progressing     Problem: Safety Risk - Non-Violent Restraints  Goal: Patient will remain free from self-harm  Description: INTERVENTIONS:  - Apply the least restrictive restraint to prevent harm  - Notify patient and family of reasons restraints applied  - Assess for any contributing factors to confusion (electrolyte disturbances, delirium, medications)  - Discontinue any unnecessary medical devices as soon as possible  - Assess the patient's physical comfort, circulation, skin condition, hydration, nutrition and elimination needs   - Reorient and redirection as needed  - Assess for the need to continue restraints  Outcome: Progressing

## 2023-08-22 NOTE — PROGRESS NOTES
@0500 Patient mental status changed. Patient started having seizure like movements, unable to follow commands, and had small bloody aspiration. RR was called, took patient to CT where she was unable to follow commands and was unable to get a good scan. Neuro consulted Per DR. Cherry an order for a MRI w/ contrast was ordered and ativan to keep pt stable during procedure.

## 2023-08-22 NOTE — SIGNIFICANT EVENT
Called to 5000 for stroke evaluation, Dr Sara Pace present  Arrived to department at 1000  Stroke Alert initiated at 1012  Last Known Normal at unclear (last night)  Pre-morbid mRS 1-2    Initial NIHSS 23 s/t unresponsive state    NIH Stroke Scale  1a. Level of consciousness: 2   1b. LOC questions:  2   1c. LOC commands: 2   2. Best Gaze: 0   3. Visual: 0   4. Facial Palsy: 0   5a. Motor left arm: 3   5b. Motor right arm: 3   6a. Motor left leg: 3   6b. Motor right leg:  3   7. Limb Ataxia: 0   8. Sensory: 0   9. Best Language:  3   10. Dysarthria: 2   11.  Extinction and Inattention: 0     Total:   23     Pt accompanied to CT dept  Dr Marleen Edwards (Neuro Critical Care) notified at 200  Dr Leena Coleman (Neuro Interventional Radiology) notified at   Per Dr Marleen Edwards, patient is not a candidate for TNK, exclusions include out of window and DAPT use  Per Dr Leena Coleman, patient is not a candidate for neuro intervention, exclusions include no LVO   Dr Sara Pace provided with imaging results and patient udpate via Desk  Dysphagia screening completed  Patient was transferred to CNICU for close monitoring and airway management    Summary of events:  Patient is admitted for UTI/urosepsis and generalized weakness with fall  RRT initiated at 0500 today for acute mental changes, CT head and neck completed and negative for acute findings   Patient was evaluated by Dr Sara Pace at approximately 0930, severe expressive aphasia noted  Shortly after patient became unresponsive with O2Sat decrease into 50's, Code Blue was initiated   Patient was emergently placed on mechanical ventilation for airway protection  Stroke Navigator at bedside, Stroke Alert initiated at 1012 due to high stroke suspicion by patient's hospitalist and responding team   Patient was taken to [address] for completion of CT, CTA, CTP  Significant delay in imaging completion was noted due to various reasons including  frequent patient assessments s/t mechanical ventilation and Propofol titration as well as administration of Versed for increased agitation, start of new US guided IV, and ventilator management by respiratory technicians   Please refer to detailed breakdown of NIHSS above.     Liborio Vital RN, BSN  Stroke Navigator  [phone]

## 2023-08-23 PROBLEM — G93.40 ACUTE ENCEPHALOPATHY: Status: ACTIVE | Noted: 2023-01-01

## 2023-08-23 PROBLEM — J90 BILATERAL PLEURAL EFFUSION: Status: ACTIVE | Noted: 2023-01-01

## 2023-08-23 PROBLEM — R56.9 SEIZURE (HCC): Status: ACTIVE | Noted: 2023-01-01

## 2023-08-23 PROBLEM — J96.01 ACUTE RESPIRATORY FAILURE WITH HYPOXIA (HCC): Status: ACTIVE | Noted: 2023-01-01

## 2023-08-23 PROBLEM — G93.40 ACUTE ENCEPHALOPATHY: Status: ACTIVE | Noted: 2023-08-23

## 2023-08-23 PROBLEM — I50.33 ACUTE ON CHRONIC DIASTOLIC HEART FAILURE (HCC): Status: ACTIVE | Noted: 2023-08-23

## 2023-08-23 PROBLEM — I50.33 ACUTE ON CHRONIC DIASTOLIC HEART FAILURE (HCC): Status: ACTIVE | Noted: 2023-01-01

## 2023-08-23 PROBLEM — R56.9 SEIZURE (HCC): Status: ACTIVE | Noted: 2023-08-23

## 2023-08-23 PROBLEM — J96.01 ACUTE RESPIRATORY FAILURE WITH HYPOXIA (HCC): Status: ACTIVE | Noted: 2023-08-23

## 2023-08-23 PROBLEM — J90 BILATERAL PLEURAL EFFUSION: Status: ACTIVE | Noted: 2023-08-23

## 2023-08-23 NOTE — CM/SW NOTE
CM attached clinical updates to [facility] referral in [facility] system. Plan for discharge to [address] when medically cleared. Isaac CABA/RUBIN to remain available for any further discharge planning needs.    Krys Arriola RN Case Manager [phone]

## 2023-08-23 NOTE — PLAN OF CARE
Received patient sedated and ventilated. Withdrawls to pain in all four extemities. MRI completed. Weaned off propofol. Open eyes to command. Wiggle to toes to command, shook head yes and no appropriately. Dr. Dulce Flores placed orders for keppra and restart propofol. Problem: NEUROLOGICAL - ADULT  Goal: Achieves stable or improved neurological status  Description: INTERVENTIONS  - Assess for and report changes in neurological status  - Initiate measures to prevent increased intracranial pressure  - Maintain blood pressure and fluid volume within ordered parameters to optimize cerebral perfusion and minimize risk of hemorrhage  - Monitor temperature, glucose, and sodium.  Initiate appropriate interventions as ordered  Outcome: Progressing  Goal: Absence of seizures  Description: INTERVENTIONS  - Monitor for seizure activity  - Administer anti-seizure medications as ordered  - Monitor neurological status  Outcome: Progressing  Goal: Remains free of injury related to seizure activity  Description: INTERVENTIONS:  - Maintain airway, patient safety  and administer oxygen as ordered  - Monitor patient for seizure activity, document and report duration and description of seizure to MD/LIP  - If seizure occurs, turn patient to side and suction secretions as needed  - Reorient patient post seizure  - Seizure pads on all 4 side rails  - Instruct patient/family to notify RN of any seizure activity  - Instruct patient/family to call for assistance with activity based on assessment  Outcome: Progressing  Goal: Achieves maximal functionality and self care  Description: INTERVENTIONS  - Monitor swallowing and airway patency with patient fatigue and changes in neurological status  - Encourage and assist patient to increase activity and self care with guidance from PT/OT  - Encourage visually impaired, hearing impaired and aphasic patients to use assistive/communication devices  Outcome: Progressing

## 2023-08-23 NOTE — PLAN OF CARE
Assumed care at [address] [place] [address]. Intubated and sedated. Continuous EEG continued. Neuro q1 w/ pupilometer. Complete care. 2300- LUE not withdrawing to pain- no corneals, KAELYN Bolaños notified, instructed to cut sedation in half. 2315- after sedation cut, all extremities withdrawing to pain & corneals intact. 0400- hypotensive, 250cc bolus. Started Bin after bolus unsuccessful. Stopped bin after rebound HTN. Per KAELYN Holman, not to increase propofol >40mcg.      Problem: Patient/Family Goals  Goal: Patient/Family Long Term Goal  Description: Patient's Long Term Goal: \"get strong and be healthy again\"    Interventions:  - Follow up appointments after discharge(PCP, Cardiology)  -Adhere to medication regimen; medication compliance  -Adhere to healthy diet; adequate fluid intake  -Activity as tolerated  -PT/OT evaluation for discharge recommendations   - See additional Care Plan goals for specific interventions  Outcome: Progressing  Goal: Patient/Family Short Term Goal  Description: Patient's Short Term Goal: \"Feel better\"    Interventions:   - Telemetry monitoring  - Continuous IV fluids  - IV antibiotics, urine cultures pending  -2D echocardiogram  -Consults- Cardiology, Dietician, Psych Liaison, PT/OT  -Labs  -Vitals q 4hrs  - See additional Care Plan goals for specific interventions  Outcome: Progressing     Problem: RESPIRATORY - ADULT  Goal: Achieves optimal ventilation and oxygenation  Description: INTERVENTIONS:  - Assess for changes in respiratory status  - Assess for changes in mentation and behavior  - Position to facilitate oxygenation and minimize respiratory effort  - Oxygen supplementation based on oxygen saturation or ABGs  - Provide Smoking Cessation handout, if applicable  - Encourage broncho-pulmonary hygiene including cough, deep breathe, Incentive Spirometry  - Assess the need for suctioning and perform as needed  - Assess and instruct to report SOB or any respiratory difficulty  - Respiratory Therapy support as indicated  - Manage/alleviate anxiety  - Monitor for signs/symptoms of CO2 retention  Outcome: Progressing     Problem: CARDIOVASCULAR - ADULT  Goal: Maintains optimal cardiac output and hemodynamic stability  Description: INTERVENTIONS:  - Monitor vital signs, rhythm, and trends  - Monitor for bleeding, hypotension and signs of decreased cardiac output  - Evaluate effectiveness of vasoactive medications to optimize hemodynamic stability  - Monitor arterial and/or venous puncture sites for bleeding and/or hematoma  - Assess quality of pulses, skin color and temperature  - Assess for signs of decreased coronary artery perfusion - ex.  Angina  - Evaluate fluid balance, assess for edema, trend weights  Outcome: Progressing  Goal: Absence of cardiac arrhythmias or at baseline  Description: INTERVENTIONS:  - Continuous cardiac monitoring, monitor vital signs, obtain 12 lead EKG if indicated  - Evaluate effectiveness of antiarrhythmic and heart rate control medications as ordered  - Initiate emergency measures for life threatening arrhythmias  - Monitor electrolytes and administer replacement therapy as ordered  Outcome: Progressing     Problem: Safety Risk - Non-Violent Restraints  Goal: Patient will remain free from self-harm  Description: INTERVENTIONS:  - Apply the least restrictive restraint to prevent harm  - Notify patient and family of reasons restraints applied  - Assess for any contributing factors to confusion (electrolyte disturbances, delirium, medications)  - Discontinue any unnecessary medical devices as soon as possible  - Assess the patient's physical comfort, circulation, skin condition, hydration, nutrition and elimination needs   - Reorient and redirection as needed  - Assess for the need to continue restraints  Outcome: Progressing

## 2023-08-23 NOTE — PROGRESS NOTES
Critical Care Progress Note     Assessment / Plan:  Acute hypoxic respiratory failure  - due to AMS. Concern for seizure  - also component of asp PNA most likely  - wean vent support as tolerated  - adjust vent per ABG  - SBT when mental status improves  Shock - likely due to sedation  - phenylephrine gtt as needed  Asp PNA  - Zosyn (8/22- )  - monitor cultures  Pleural effusions - due to HFpEF and poor nutritional state  - monitor  AMS  - neuroCC following  - EEG in process  - MRI ordered  Anemia - chronic  - monitor  FEN  - NPO  - start TFs if remains intubated  Proph  - subcu heparin  Dispo  - ICU    Discussed with Deepak Snachez RN    Critical care time: 35 minutes      Subjective:  Started on phenylephrine gtt for shock    Objective:   08/23/23  0530 08/23/23  0600 08/23/23  0700 08/23/23  0723   BP: (!) 176/80 115/52 107/51 97/50   BP Location:       Pulse: (!) 37 52 67 68   Resp:  10 10 (!) 4   Temp:       TempSrc:       SpO2:  98% 99% 97%   Weight:       Height:         Physical Exam:  General: intubated  Skin: no rash, ulcers or subcutaneous nodules  Eyes: anicteric sclerae, moist conjunctivae  Head, ears, nose, throat: atraumatic, oropharynx clear with moist mucous membranes  Neck: trachea midline with no thyromegaly  Heart: regular rate and rhythm, no murmurs / rubs / gallops  Lungs: bibasilar rhonchi  Abdomen: soft, nontender, nondistended   Extremities: no edema or cyanosis  Psych: sedated    Medications:  Reviewed in EMR    Lab Data:  Reviewed in EMR    Imaging:  I independently visualized all relevant chest imaging in PACS and agree with radiology interpretation except where noted.

## 2023-08-23 NOTE — OCCUPATIONAL THERAPY NOTE
OCCUPATIONAL THERAPY                               OT following. Patient transferred to [facility] 8/22/23 after RRT and is currently intubated and sedated. Will follow and re-attempt at a later date when appropriate for therapy activity.

## 2023-08-24 NOTE — PROGRESS NOTES
Notified by RN patient's HR in the 30s. Patient intubated and sedated. EKG performed. On call cardiologist notified.

## 2023-08-24 NOTE — PROCEDURES
LONG-TERM VIDEO EEG REPORT;     Reason for Examination: Seizures     Technical Summary:   18 Channels of EEG and 1 Channel of EKG was performed utilizing internation 10/20 method. Movement, muscle and machine artifact was noted. Recording start date/time: 8/23 at 12:36 PM  Recording end date/time: 8/24 at 12:35 PM     Background Activity:   Background activity consisted of reasonably well-regulated 7 Hz waveforms distributed over both hemispheres and was reactive to eye opening. Throughout the recording marked, generalized sharp wave, sharp and wave and slow sharp wave activity was noted with slight right-sided predominance. Intermittent runs of generalized sharp wave activities lasting 6 seconds were also noted without any clinical accompaniments. Activation:     Hyperventilation:   Not performed. Photic Stimulation:  Not performed. Sleep:  Stage I and II sleep seen. Impression: This is an abnormal long-term video EEG recording. Generalized epileptiform activities as mentioned above was noted throughout the recording. These activities were not associated with any clinical evidence of seizure. Some of these runs may be consistent with electrographic seizures. Significant abnormal findings discussed with neuro critical care attending. Daniel Jay MD  [facility].

## 2023-08-24 NOTE — RESPIRATORY THERAPY NOTE
SBT trial done ps 5/5/40% hour long ABG drawn results called to Dr Diaz Stoneridge with verbal to extubate pt. Pt tolerated well placed on 3L NC bilateral rhonchi noted no stridor.

## 2023-08-24 NOTE — PROCEDURES
.  LONG-TERM VIDEO EEG REPORT;    Reason for Examination: Seizures    Technical Summary:   18 Channels of EEG and 1 Channel of EKG was performed utilizing internation 10/20 method. Movement, muscle and machine artifact was noted. Recording start date/time: 8/22 at 12:36 PM  Recording end date/time: 8/23 at 12:10 PM    Background Activity: The background activity initially was noted to be of low amplitude characterized by intermittent 4 to 6 Hz waveforms lasting 2 to 5 seconds followed by suppression of background activity for 2 to 10 seconds. As the patient was slowly weaned off from propofol generalized sharp wave and sharp and wave activity became more obvious, slightly more prominent in the left temporal region. During the recording at least 1 episode of electrographic seizure lasting approximately 30 seconds was noted. After initiation of antiepileptic drug and, later on other medications overall generalized sharp wave activities decreased and intermittent 3 to 6 Hz slow activity was noted. Activation:    Hyperventilation:   Not performed. Photic Stimulation:  Not performed. Sleep:  Stage I and II sleep seen. Impression: This is an abnormal long-term video EEG recording. Burst suppression pattern was noted initially followed by generalized epileptiform activity and at least 1 episode of electrographic seizure, as mentioned above. During the later half of recording moderate diffuse slowing was noted. Clinical correlation is recommended      MD Suzanne Navas.

## 2023-08-24 NOTE — PLAN OF CARE
Assumed care at 1. Q1 neuro w/ pupillometer done, exam unchanged. BP kept within parameters of -160 with Morris at 10. Propofol gtt continued. OG with continuous tube feed at goal rate, patient tolerating well. IVF and iv abx continued. Straight cath x1. Ddimer elevated, CTA chest and BLE doppler ordered. CTA chest done, results pending. Plan for BLE doppler. 0500- sinus kole on the monitor, lowest 30. Notified [facility] APN. EKG done. 0600- MD Reagan Tellez returned call, labs ordered. OK from [facility] APN to adjust propofol rate. Pads placed on patient, MRX at bedside.      Problem: Patient/Family Goals  Goal: Patient/Family Long Term Goal  Description: Patient's Long Term Goal: \"get strong and be healthy again\"    Interventions:  - Follow up appointments after discharge(PCP, Cardiology)  -Adhere to medication regimen; medication compliance  -Adhere to healthy diet; adequate fluid intake  -Activity as tolerated  -PT/OT evaluation for discharge recommendations   - See additional Care Plan goals for specific interventions  Outcome: Not Progressing  Goal: Patient/Family Short Term Goal  Description: Patient's Short Term Goal: \"Feel better\"    Interventions:   - Telemetry monitoring  - Continuous IV fluids  - IV antibiotics, urine cultures pending  -2D echocardiogram  -Consults- Cardiology, Dietician, Psych Liaison, PT/OT  -Labs  -Vitals q 4hrs  - See additional Care Plan goals for specific interventions  Outcome: Not Progressing     Problem: RESPIRATORY - ADULT  Goal: Achieves optimal ventilation and oxygenation  Description: INTERVENTIONS:  - Assess for changes in respiratory status  - Assess for changes in mentation and behavior  - Position to facilitate oxygenation and minimize respiratory effort  - Oxygen supplementation based on oxygen saturation or ABGs  - Provide Smoking Cessation handout, if applicable  - Encourage broncho-pulmonary hygiene including cough, deep breathe, Incentive Spirometry  - Assess the need for suctioning and perform as needed  - Assess and instruct to report SOB or any respiratory difficulty  - Respiratory Therapy support as indicated  - Manage/alleviate anxiety  - Monitor for signs/symptoms of CO2 retention  Outcome: Not Progressing     Problem: CARDIOVASCULAR - ADULT  Goal: Maintains optimal cardiac output and hemodynamic stability  Description: INTERVENTIONS:  - Monitor vital signs, rhythm, and trends  - Monitor for bleeding, hypotension and signs of decreased cardiac output  - Evaluate effectiveness of vasoactive medications to optimize hemodynamic stability  - Monitor arterial and/or venous puncture sites for bleeding and/or hematoma  - Assess quality of pulses, skin color and temperature  - Assess for signs of decreased coronary artery perfusion - ex.  Angina  - Evaluate fluid balance, assess for edema, trend weights  Outcome: Not Progressing  Goal: Absence of cardiac arrhythmias or at baseline  Description: INTERVENTIONS:  - Continuous cardiac monitoring, monitor vital signs, obtain 12 lead EKG if indicated  - Evaluate effectiveness of antiarrhythmic and heart rate control medications as ordered  - Initiate emergency measures for life threatening arrhythmias  - Monitor electrolytes and administer replacement therapy as ordered  Outcome: Not Progressing     Problem: Safety Risk - Non-Violent Restraints  Goal: Patient will remain free from self-harm  Description: INTERVENTIONS:  - Apply the least restrictive restraint to prevent harm  - Notify patient and family of reasons restraints applied  - Assess for any contributing factors to confusion (electrolyte disturbances, delirium, medications)  - Discontinue any unnecessary medical devices as soon as possible  - Assess the patient's physical comfort, circulation, skin condition, hydration, nutrition and elimination needs   - Reorient and redirection as needed  - Assess for the need to continue restraints  Outcome: Not Progressing     Problem: NEUROLOGICAL - ADULT  Goal: Achieves stable or improved neurological status  Description: INTERVENTIONS  - Assess for and report changes in neurological status  - Initiate measures to prevent increased intracranial pressure  - Maintain blood pressure and fluid volume within ordered parameters to optimize cerebral perfusion and minimize risk of hemorrhage  - Monitor temperature, glucose, and sodium.  Initiate appropriate interventions as ordered  Outcome: Not Progressing  Goal: Absence of seizures  Description: INTERVENTIONS  - Monitor for seizure activity  - Administer anti-seizure medications as ordered  - Monitor neurological status  Outcome: Not Progressing  Goal: Remains free of injury related to seizure activity  Description: INTERVENTIONS:  - Maintain airway, patient safety  and administer oxygen as ordered  - Monitor patient for seizure activity, document and report duration and description of seizure to MD/LIP  - If seizure occurs, turn patient to side and suction secretions as needed  - Reorient patient post seizure  - Seizure pads on all 4 side rails  - Instruct patient/family to notify RN of any seizure activity  - Instruct patient/family to call for assistance with activity based on assessment  Outcome: Not Progressing  Goal: Achieves maximal functionality and self care  Description: INTERVENTIONS  - Monitor swallowing and airway patency with patient fatigue and changes in neurological status  - Encourage and assist patient to increase activity and self care with guidance from PT/OT  - Encourage visually impaired, hearing impaired and aphasic patients to use assistive/communication devices  Outcome: Not Progressing

## 2023-08-25 NOTE — PROGRESS NOTES
[facility] Pharmacy Note:  Discontinuation of Stress Ulcer Prophylaxis     Gayla Flor is a 81year old patient who was initiated on stress ulcer prophylaxis with pantoprazole (PROTONIX). The patient no longer meets criteria for stress ulcer prophylaxis. It has been discontinued per the pharmacy consult.     Thank you,  Renee Bautista, PharmD  8/25/2023 9:58 AM

## 2023-08-25 NOTE — PLAN OF CARE
Received this a.m., drowsy but participating in neuro assessment, see flow sheet for full assessment. Continuous eeg removed as ordered. Voice barely above a whisper, sometimes difficult to hear, but oriented to self and place. Started on dextrose due to blood sugars trending down, will stop when tube feeds restarted. Generalized weakness. Gurgly cough, bringing up white secretions, rhochi breath sounds. Speech at bedside to evaluate, did not pass swallow evaluation, see note. Updated hospitalist, placement for dobhoff obtained. Attempted x2 to place but unable to advance, order received to place in radiology. This RN transporting to radiology,  placing NG with xray. Will start tube feeds and give medications. SB/SR monitor, noted when pt sleeping heart rate drops to 30's. Tele orders received, awaiting bed. Plan of care updated with daughter and patient, questions answered, verbalized understanding.

## 2023-08-25 NOTE — PLAN OF CARE
Assumed care of pt. At [address]. Pt. A & O to self & place. Reoriented to time & situation. VSS - NSR w/ BBB. GARDNER w/ generalized weakness. Neuro checks, mostly intact, see neuro flowsheets. Continuous EEG at bedside. Sudhir. Will continue to monitor closely.

## 2023-08-25 NOTE — PHYSICAL THERAPY NOTE
PHYSICAL THERAPY RE-EVALUATION - INPATIENT     Room Number: 1525/8245-T  Evaluation Date: 8/25/2023  Type of Evaluation: Re-evaluation  Physician Order: PT Eval and Treat    Presenting Problem: Fall, disorientation, seizure, bradycardia  Co-Morbidities : HTN, OA, CAD, CVA, DVT, CKD stage III  Reason for Therapy: Mobility Dysfunction and Discharge Planning    History related to current admission: Patient is a 81year old female admitted on 8/17/2023 from home for fall, disorientation, weakness. Pt diagnosed with weakness, dehydration, possible UTI, elevated troponin. Pt with bouts of asymptomatic bradycarida in the 40s, per chart review. Brain CT negative for any acute findings. Had [facility] [facility] carotid completed 8/18.    8/22 - Pt had AMS, seizure activity and rapid response called. Pt was intubated for airway protection and transferred to CNICU. Pt was extubated 8/24.  8/24 le US and CT of chest were negative for DVT/PE.  8/24 eeg had some runs that may be consistent w/ electrographic seizures. US carotid doppler 8/18  CONCLUSION:    1. Plaque causes 50-69% stenosis of the proximal left internal carotid artery. 2. Plaque of visualized right extracranial carotid artery system causes less than 50% stenosis. 3. Antegrade flow in both vertebral arteries. ASSESSMENT   In this PT evaluation, the patient presents with the following impairments decreased alertness, diminished static/dynamic sitting/standing balance, profound le and ue weakness, limited cardiopulmonary endurance, R hand edema. These impairments and comorbidities manifest themselves as functional limitations in independent bed mobility, transfers, and gait. The patient is below baseline and would benefit from skilled inpatient PT to address the above deficits to assist patient in returning to prior to level of function. Functional outcome measures completed include AMPAC and modified [place].   The AM-PAC '6-Clicks' Inpatient Basic Mobility Short Form was completed and this patient is demonstrating a Approx Degree of Impairment: 72.57%  degree of impairment in mobility. Research supports that patients with this level of impairment may benefit from acute rehab at d/c.    DISCHARGE RECOMMENDATIONS  PT Discharge Recommendations: Acute rehabilitation    PLAN  PT Treatment Plan: Bed mobility; Patient education; Family education;Gait training;Strengthening;Range of motion;Transfer training;Balance training  Rehab Potential : Good  Frequency (Obs): 3-5x/week  Number of Visits to Meet Established Goals: 5      CURRENT GOALS    Goal #1 Patient is able to demonstrate supine - sit EOB @ level: minimum assistance     Goal #2 Patient is able to demonstrate transfers EOB to/from [facility] at assistance level: moderate assistance     Goal #3 Patient is able to ambulate 10 feet with assist device: walker - rolling at assistance level: moderate assistance     Goal #4    Goal #5    Goal #6    Goal Comments: Goals established on 8/25/2023    HOME SITUATION  Type of Home: House   Home Layout: Multi-level;Bed/bath upstairs  Stairs to Enter : 2  Railing: Yes  Stairs to Bedroom: 7  Railing: Yes    Lives With: Alone  Drives: Yes  Patient Owned Equipment: Rolling walker  Patient Regularly Uses: None    Prior Level of [place]:  Per pt- lives alone in multi level home. Two steps to enter, and 7 steps to reach main floor. Ambulates with RW. Has family near by, but  they are unable to stay with her.  just passed in June. SUBJECTIVE  Nodding head yes when asked if she would like to stand. OBJECTIVE  Precautions: Bed/chair alarm  Fall Risk: High fall risk    WEIGHT BEARING RESTRICTION  Weight Bearing Restriction: None                PAIN ASSESSMENT  Rating: Other (Comment)  Location: CPOT 0/8, no evidence of pain during session  Management Techniques: Activity promotion; Body mechanics;Repositioning    COGNITION  Overall Cognitive Status:  Impaired  Arousal/Alertness: delayed responses to stimuli and lethargic  Following Commands:  follows one step commands with increased time and follows one step commands with repetition  Initiation: cues to initiate tasks and hand over hand to initiate tasks  Motor Planning: impaired  Awareness of Deficits:  decreased awareness of deficits    RANGE OF MOTION AND STRENGTH ASSESSMENT  Upper extremity ROM and strength -see OT evaluation    Lower extremity ROM is within functional limits - passively    Lower extremity strength is within functional limits except for the following:    Right Hip flexion  2/5  Left Hip flexion  2/5  Right Knee extension  3/5  Left Knee extension  3/5  Right Dorsiflexion  3/5  Left Dorsiflexion  3/5      BALANCE  Static Sitting: Fair -  Dynamic Sitting: Poor +  Static Standing: Poor -  Dynamic Standing: Not tested    ADDITIONAL TESTS  Additional Tests: Modified [place]              Modified [place]: 4                  ACTIVITY TOLERANCE  Pulse: 53  Heart Rate Source: Monitor  Resp: 13                O2 WALK  Oxygen Therapy  SPO2% on Room Air at Rest: 98    NEUROLOGICAL FINDINGS                        AM-PAC '6-Clicks' INPATIENT SHORT FORM - BASIC MOBILITY  How much difficulty does the patient currently have. .. Patient Difficulty: Turning over in bed (including adjusting bedclothes, sheets and blankets)?: A Little   Patient Difficulty: Sitting down on and standing up from a chair with arms (e.g., wheelchair, bedside commode, etc.): A Lot   Patient Difficulty: Moving from lying on back to sitting on the side of the bed?: A Lot   How much help from another person does the patient currently need. ..    Help from Another: Moving to and from a bed to a chair (including a wheelchair)?: A Lot   Help from Another: Need to walk in hospital room?: Total   Help from Another: Climbing 3-5 steps with a railing?: Total       AM-PAC Score:  Raw Score: 11   Approx Degree of Impairment: 72.57%   Standardized Score (AM-PAC Scale): 33.86 CMS Modifier (G-Code): CL    FUNCTIONAL ABILITY STATUS  Gait Assessment   Functional Mobility/Gait Assessment  Gait Assistance: Not tested  Distance (ft): 0  Assistive Device:  (N/a)  Pattern:  (N/a)    Skilled Therapy Provided     Bed Mobility:  Rolling: Right - min a of 1  Supine to sit: Mod a of 2 for both trunk and le's. Once in sitting, pt w/ posterior lean. Worked on centered sitting and slowly, pt was able to gain sitting balance - over at least 10 minutes. Pt completed 10 reps of ankle pumps and 5 laq while sitting on eob. Sit to supine: Mod a of 2 for trunk and le's. Transfer Mobility:  Sit to stand: Cues for hand placement, including actively putting pt's hand on bed. Could not maintain and kept reaching for walker. Completed sit to stand 2x w/ max a of 2. Stand to sit: Mod a of 2 to control descent - in particular after 2nd standing attempt as therapist shifted pt's weight onto R le - it buckled. Gait = Tolerated standing 10 seconds 1st attempt. Second stand, was able to initiate side stepping. Pt able to advance R le to the side, but could not w/ the L. During weight shift R le buckled. Overall standing time second stand was 1.5 minutes. Therapist's Comments: Had pt using her L hand to use Yankauer on her own while sitting. Encouraging pt to cough and then functionally manage her secretions. Once pt returned to supine, transitioned pt to full chair mode of bed. Tolerated well, but lowered hob ~ 10 degrees. Discussed w/ pt and family, that pt's exercise is to use her Yankauer on her own as much as possible. Exercise/Education Provided:  Bed mobility  Functional activity tolerated  Gait training  Transfer training    Patient End of Session: In bed;Needs met;Call light within reach;RN aware of session/findings; All patient questions and concerns addressed; Family present (Dave Dye aware of eval session)      Patient Evaluation Complexity Level:  History Moderate - 1 or 2 personal factors and/or co-morbidities   Examination of body systems Moderate - addressing a total of 3 or more elements   Clinical Presentation Moderate - Evolving   Clinical Decision Making Moderate - Evolving       PT Session Time: 40 minutes  Gait Trainin minutes  Therapeutic Activity: 30 minutes  Neuromuscular Re-education: 3 minutes  Therapeutic Exercise: 5 minutes

## 2023-08-25 NOTE — PLAN OF CARE
Assumed care of patient this morning. Patient vented/sedated with propofol. Discussed with neuro; Propofol placed on standby. Neuro checks changed to q2h. Abbott/follows commands. Extubated late afternoon. Voice is hoarse. Generalized weakness noted but equal. OG removed with extubation. Straight cath for retention. Vss. Bp remains within parameters given. EEG continued. No obvious seizure noted this shift. See assessments for complete details. Daughter at bedside. Questions encouraged/answered. Will continue to monitor.

## 2023-08-25 NOTE — PROCEDURES
LONG-TERM VIDEO EEG REPORT;     Reason for Examination: Seizures     Technical Summary:   18 Channels of EEG and 1 Channel of EKG was performed utilizing internation 10/20 method. Movement, muscle and machine artifact was noted. Recording start date/time: 8/24 at 12:36 PM  Recording end date/time: 8/25 at 10:45 AM     Background Activity:   Background activity consisted of reasonably well-regulated 8-9 Hz waveforms distributed over both hemispheres and was reactive to eye opening. Throughout the recording mild, intermittent, generalized sharp wave activity was noted with slight right-sided predominance. Mild, intermittent 3 to 6 Hz diffuse slowing was noted throughout the recording. Activation:     Hyperventilation:   Not performed. Photic Stimulation:  Not performed. Sleep:  Stage I and II sleep seen. Impression: This is an abnormal long-term video EEG recording. Generalized epileptiform activities as mentioned above was noted throughout the recording. Evidence of electrographic seizures were noted. Mild to moderate diffuse slowing in delta and theta range was also noted. This constellation of finding can be seen due to encephalopathy secondary to hypoxic/metabolic/toxic etiology, medication effect or diffuse cerebral injury. Clinical correlation is recommended. Kaylah Pope MD  [facility].

## 2023-08-25 NOTE — SLP NOTE
ADULT SWALLOWING EVALUATION    ASSESSMENT    ASSESSMENT/OVERALL IMPRESSION:  Order received for BSE to r/o aspiration; chart reviewed. Patient emergently intubated 8/22/23 after AMS with seizure like activity with desats. Patient initially presented on 8/17/23 after fall x2 at home. MRI brain negative for acute stroke. EEG +seizures thus loaded on Keppra/Vimpat. Extubated yesterday. Medical history significant for asthma, L MCA ischemic stroke in Oct 2022 with residual aphasia, anxiety, hypothyroid, DVT. Patient was on regular diet with thin liquids at baseline. Patient remains in [facility]. and RN requested for SLP to proceed. Patient received asleep however awakened with verbal stimuli. Patient remarkably weak and lethargic - required constant cues to remain alert for adequate participation in swallow evaluation. No overt focal weakness appreciated however noted generalized weakness in lingual ROM and strength bilaterally. Patient with severely congested cough, unable to produce. Vocal quality severely hoarse and breathy with intermittent \"gurgly\" quality. Patient followed all directions within physical means, Patient responded to simple questions via yes/no appropriately. Suspect at least moderate oropharyngeal dysphagia characterized by lethargic/weakened status, suspect delay in pharyngeal swallow response and markedly diminished laryngeal elevation, decreased cough strength for airway protection, decreased swallow safety and efficiency suspected with risk for further respiratory compromise at this time given high risk for aspiration. Patient exhibited increased throat clearing and suspect delayed cough after minimal PO trials. Recommend con't NPO. Recommend gentle oral care/hygiene with use of suction. Recommend oral mucosa moisture via dampened oral swabs to lips/tongue. Encourage reflexive swallow response. SLP to re-assess tomorrow, or as appropriate.           RECOMMENDATIONS   Diet Recommendations - Solids: NPO  Diet Recommendations - Liquids: NPO  Medication Administration Recommendations: Non-oral  Treatment Plan/Recommendations: SLP to reassess       HISTORY   MEDICAL HISTORY  Reason for Referral: R/O aspiration    Problem List  Principal Problem:    Weakness generalized  Active Problems:    Depression    Falls, initial encounter    Elevated troponin    Urinary tract infection without hematuria    Hypoxia    Unresponsive    Cerebrovascular accident (CVA) (Vernel Breed)    Acute respiratory failure with hypoxia (Vernel Breed)    Bilateral pleural effusion    Acute on chronic diastolic heart failure (Vernel Breed)    Acute encephalopathy    Seizure (Vernel Breed)      Past Medical History  Past Medical History:   Diagnosis Date    Acute bronchitis     Anxiety state     Arrhythmia     Asthma     Cataract     Coronary atherosclerosis     Cough     Depression     Disorder of thyroid     Diverticulosis of large intestine     DVT (deep venous thrombosis) (HCC)     left    Esophageal reflux     H/O mammogram 10/21/1999    Hearing impairment     Hepatitis 1970s    High blood pressure     High cholesterol     History of PTCA 2011    Hypothyroid     Muscle weakness     Obesity, unspecified     Osteoarthritis     Prediabetes     Stroke (Vernel Breed)     Unspecified essential hypertension     Visual impairment     glasses       Prior Living Situation: Home alone  Diet Prior to Admission: Regular; Thin liquids       Patient/Family Goals: patient unable to state at this time    SWALLOWING HISTORY  Current Diet Consistency: NPO  Dysphagia History: None; BSE after stroke in Oct 2022 was [facility]  Imaging Results: negative recent brain MRI    OBJECTIVE   ORAL MOTOR EXAMINATION  Dentition: Functional  Symmetry: Within Functional Limits  Strength: Reduced right lingual;Reduced left lingual     Range of Motion: Reduced right lingual;Reduced left lingual  Rate of Motion: Unable to assess    Voice Quality: Weak;Breathy;Hoarse  Respiratory Status: Nasal cannula  Consistencies Trialed: Thin liquids;Puree  Method of Presentation: Staff/Clinician assistance;Spoon  Patient Positioning: Midline;Upright    Oral Phase of Swallow: Within Functional Limits           Pharyngeal Phase of Swallow: Impaired  Laryngeal Elevation Timing: Appears impaired  Laryngeal Elevation Strength: Appears impaired  Laryngeal Elevation Coordination: Appears impaired  (Please note: Silent aspiration cannot be evaluated clinically.  Videofluoroscopic Swallow Study is required to rule-out silent aspiration.)    Esophageal Phase of Swallow: No complaints consistent with possible esophageal involvement              GOALS  Goal #1 SLP to re-assess via clinical swallow evaluation when deemed appropriate New     FOLLOW UP  Treatment Plan/Recommendations: SLP to reassess     Follow Up Needed (Documentation Required): Yes  SLP Follow-up Date: 08/26/23    Thank you for your referral.   If you have any questions, please contact Marbin Boles, [address], jhonatan Ga[facility]-SLP/L, pager [phone]  Speech-Language Pathologist

## 2023-08-26 NOTE — PLAN OF CARE
Lethargic this am, difficult to arouse but following commands. Disoriented to place and time. More alert this afternoon, easily re-oriented and talking more. Weaning O2, currently on 1.5L NC. Very weak non-productive cough. SB c HR in 30-50's. Afebrile. UOP ~700 from external catheter. 2 loose BM's today, TF advanced to 35 ml/hr, but not advanced to goal per MD order. Daughter @ bedside and updated on POC. Still awaiting telemetry bed.  No other issues at this time, will continue to monitor

## 2023-08-26 NOTE — PROGRESS NOTES
[facility] Pharmacy Note: Route Optimization for Levetiracetam (KEPPRA)    Patient is currently on Levetiracetam (KEPPRA) 1500 mg IV every 12 hours. The patient meets the criteria to convert to the oral equivalent as established by the IV to Oral conversion protocol approved by the P&T committee. Medication was changed from IV formulation to Levetiracetam (KEPPRA)  1500 mg per tube every 12 hours per protocol. [facility] Pharmacy Note: Route Optimization for Lacosamide (VIMPAT)    Patient is currently on Lacosamide (VIMPAT) 200 mg IV every 12 hours. The patient meets the criteria to convert to the oral equivalent as established by the IV to Oral conversion protocol approved by the P&T committee. Medication was changed from IV formulation to Lacosamide (VIMPAT) 200 mg per tube every 12 hours per protocol.       Jt Sylvester, PharmD  8/26/2023,  12:48 PM

## 2023-08-26 NOTE — PLAN OF CARE
Patient VSS. No complaints of pain. Neuro unchanged from baseline. Tube feeding continues with no complications. Purewick with good urine output. Family updated with plan of care.

## 2023-08-26 NOTE — SLP NOTE
Attempted to see patient for bedside swallow re-assessment. Spoke with RN Blessing Kaufman who reports the patient remains lethargic and is currently not appropriate for assessment. SLP will continue to follow and will complete re-evaluation as appropriate.      Deandre Clark   Speech Language Pathologist  Office phone: [phone]  Pager: [phone], #[phone]

## 2023-08-27 NOTE — PLAN OF CARE
Patient VSS. No issues with pain or discomfort. Patient woke up combative and aggressive. Tried to pull out her Dobhoff and grabbed her left hand and  caused a skin tear which was dressed but continue to bleed. Patient Neuro status at baseline with no significant changes. Purewick changed and intact. Tube feeding tolerated with flush with complications.

## 2023-08-27 NOTE — PLAN OF CARE
Pt with increasing delirium and confusion today. This afternoon became combative and agitated with staff and family. MD notified. Low dose precedex gtt started, stopped after two hours as pt was sleeping and calm. See neuro FS for further neuro assessment. Multiple small liquid BM's today. MD aware. Tube-feedings slowly being advanced. UOP ~700 mls. Bottom very excoriated and red & skin tear to left hand. Still weak non-productive cough, encouraging flutter valve. Family @ bedside and updated on POC.  Will continue to monitor

## 2023-08-27 NOTE — PHYSICAL THERAPY NOTE
PHYSICAL THERAPY TREATMENT NOTE - INPATIENT    Room Number: 1199/6984-Z     Session: 1/5     Number of Visits to Meet Established Goals: 4    Presenting Problem: Fall, disorientation, seizure, bradycardia  Co-Morbidities : CVA, HTN, CAD, DM, DVT, OA, CKD3  History related to current admission: Patient is a 81year old female admitted on 8/17/2023 from home for fall, disorientation, weakness. Pt diagnosed with weakness, dehydration, possible UTI, elevated troponin. Pt with bouts of asymptomatic bradycarida in the 40s, per chart review. Brain CT negative for any acute findings. Had [address] [facility] carotid completed 8/18.     8/22 - Pt had AMS, seizure activity and rapid response called. Pt was intubated for airway protection and transferred to CNICU. Pt was extubated 8/24.  8/24 le US and CT of chest were negative for DVT/PE.  8/24 eeg had some runs that may be consistent w/ electrographic seizures. US carotid doppler 8/18  CONCLUSION:    1. Plaque causes 50-69% stenosis of the proximal left internal carotid artery. 2. Plaque of visualized right extracranial carotid artery system causes less than 50% stenosis. 3. Antegrade flow in both vertebral arteries. ASSESSMENT     Pt agreeable to therapy session. Pt not able to speak coherently during treatment session and when daughter walked in to see her, the daughter stated this was different from her usual. RN called into the room and pt assisted back to supine. RN stated pt had just received her seizure medication and may make pt drowsy. Pt remains well below her baseline level of function and will continue to benefit from skilled inpt PT followed by AR. Pt appears to be very motivated to participate with therapy. DISCHARGE RECOMMENDATIONS  PT Discharge Recommendations: Acute rehabilitation     PLAN  PT Treatment Plan: Bed mobility; Patient education; Family education;Gait training;Strengthening;Range of motion;Transfer training;Balance training  Rehab Potential : Good  Frequency (Obs): 3-5x/week    CURRENT GOALS   Goal #1 Patient is able to demonstrate supine - sit EOB @ level: minimum assistance      Goal #2 Patient is able to demonstrate transfers EOB to/from [facility] at assistance level: moderate assistance      Goal #3 Patient is able to ambulate 10 feet with assist device: walker - rolling at assistance level: moderate assistance      Goal #4     Goal #5     Goal #6     Goal Comments: Goals established on 8/25/2023 8/27/2023 all goals ongoing       SUBJECTIVE  Pt only able to verbalize \"yes\" two times during session today. OBJECTIVE  Precautions: Aspiration; Seizure;Bed/chair alarm    WEIGHT BEARING RESTRICTION  Weight Bearing Restriction: None                PAIN ASSESSMENT   Rating: Other (Comment)  Location: no evidence of pain  Management Techniques: Activity promotion; Body mechanics;Repositioning    BALANCE                                                                                                                       Static Sitting: Poor  Dynamic Sitting: Poor -           Static Standing: Not tested  Dynamic Standing: Not tested    ACTIVITY TOLERANCE                         O2 WALK         AM-PAC '6-Clicks' INPATIENT SHORT FORM - BASIC MOBILITY  How much difficulty does the patient currently have. .. Patient Difficulty: Turning over in bed (including adjusting bedclothes, sheets and blankets)?: A Little   Patient Difficulty: Sitting down on and standing up from a chair with arms (e.g., wheelchair, bedside commode, etc.): A Lot   Patient Difficulty: Moving from lying on back to sitting on the side of the bed?: A Lot   How much help from another person does the patient currently need. ..    Help from Another: Moving to and from a bed to a chair (including a wheelchair)?: Total   Help from Another: Need to walk in hospital room?: Total   Help from Another: Climbing 3-5 steps with a railing?: Total       AM-PAC Score:  Raw Score: 10   Approx Degree of Impairment: 76.75%   Standardized Score (AM-PAC Scale): 32.29   CMS Modifier (G-Code): CL    FUNCTIONAL ABILITY STATUS  Gait Assessment   Functional Mobility/Gait Assessment  Gait Assistance: Not tested  Distance (ft): 0  Assistive Device:  (N/a)  Pattern:  (N/a)    Skilled Therapy Provided    Bed Mobility:  Rolling: max A   Supine<>Sit: max A   Sit<>Supine: max A     Transfer Mobility:  Sit<>Stand: NT   Stand<>Sit: NT   Gait: NT    Therapist's Comments: pt following commands about 50% of time to AAROM to extremities x 4 in supine. Pt assisted to EOB with max A and max cues for sequencing. Pt sitting EOB x 15 min with min/mod A and working on midline orientation, sitting balance and Ex. Pt returned to supine with max A and placed bed in chair position. THERAPEUTIC EXERCISES  Lower Extremity Ankle pumps  Knee extension     Upper Extremity Elbow flex/ext,  - open/close, and Wrist flex/ext-some AAROM     Position Sitting     Repetitions   5-10   Sets   1     Patient End of Session: In bed; With [address] [facility] staff;Needs met;Call light within reach;RN aware of session/findings; All patient questions and concerns addressed; Alarm set; Family present    PT Session Time: 35 minutes  Gait Trainin minutes  Therapeutic Activity: 10 minutes  Therapeutic Exercise: 8 minutes   Neuromuscular Re-education: 5 minutes

## 2023-08-28 ENCOUNTER — ANESTHESIA EVENT (OUTPATIENT)
Dept: MEDSURG UNIT | Facility: HOSPITAL | Age: 83
End: 2023-08-28
Payer: MEDICARE

## 2023-08-28 ENCOUNTER — ANESTHESIA (OUTPATIENT)
Dept: MEDSURG UNIT | Facility: HOSPITAL | Age: 83
End: 2023-08-28
Payer: MEDICARE

## 2023-08-28 PROBLEM — R04.0 EPISTAXIS: Status: ACTIVE | Noted: 2023-01-01

## 2023-08-28 PROBLEM — R04.0 EPISTAXIS: Status: ACTIVE | Noted: 2023-08-28

## 2023-08-28 NOTE — PLAN OF CARE
Notified by Rn that pt had 3 second pauses and blood pressure in 61/30. HR 30's. Pt denies any complaints. Hospitalist ordered for albumin and Levo. Reviewed 12 lead EKG  Agreed with plan of care. Addendum: 155  Notified by RN that pt with HR in 30's, pt more lethargic with low HR. Pt has pauses ranging from 3-4 second on tele. Blood pressure in 130's when on levo 0.5mcg/min, and when it is off BP drops to 80's. Tele strips reviewed. Plan   Stop levo,   Start pt on dopamine gtt  Discussed with Dr Gretel Luis.

## 2023-08-28 NOTE — PLAN OF CARE
[id number]: Pt with ongoing suctioning d/t gurgling & rhonchorous breath sounds. Suctioning out several large clots. O2 requirements significantly increased up to 12L HFNC. Anti-coagulation held. Pulm notified and @ bedside. Tubefeedings stopped. Hgb 8.6    1100: Pt electively intubated & bedside bronch done by MD. Pt with nosebleed again, ENT notified. 1250: ENT notified again d/t ongoing nosebleed. TXA ordered. Notified other services of med and all Mds aware of administration. 1450: TXA given to stop ongoing nosebleed. Blood mostly clotting up, with some oozing. 1700: Large blood clot suctioned from back of pt's mouth. MD @ bedside to place rhino rocket to left nare. Plans to leave in place and re-assess in am. R nare continuing to ooze small amount. Hgb 8.1    Dopamine & propofol gtts. NSR on monitor, HR in 60's. Tubefeedings remain on hold until further assessment tomorrow, per provider. D10 infusing. No BM's today. Pt unable to void, ISC @ 1730 for 450 mLs. Urine is tea-colored & hazy. Afebrile.  Daughter, Yunior Vogt, @ bedside & updated on POC

## 2023-08-28 NOTE — PHYSICAL THERAPY NOTE
Attempted to see pt for physical therapy at this time. Pt noted to have epistaxis overnight with increased O2 needs, requiring re-intubation this morning. Pulm at bedside preparing for bronch. Will hold all physical therapy at this time. Continue to follow peripherally, and plan to resume therapy as clinically appropriate.

## 2023-08-28 NOTE — PROGRESS NOTES
[facility] Pharmacy Note:  Renal Dose Adjustment    Sree Marmolejo has been prescribed famotidine (PEPCID) 20 mg intravenously and orally every 12 hours. Estimated Creatinine Clearance: 33.6 mL/min (based on SCr of 1.02 mg/dL). Calculated creatinine clearance is < 50 ml/min, therefore the dose of famotidine (Pepcid) has been changed to 20 mg intravenously and orally every 24 hours per P&T approved protocol. Pharmacy will continue to follow, and if renal function improves, will resume the original order.     Thank you,  Rosalio Barr, PharmD  8/28/2023 10:12 AM

## 2023-08-28 NOTE — SLP NOTE
Note patient orally intubated. Will hold evaluation and follow peripherally, reattempting as clinically appropriate once extubated. Discussed with BRIANA.     Ada Duane Luite Jimenez 88 CCC-SLP  Pager [phone]

## 2023-08-28 NOTE — PLAN OF CARE
Assumed care of patient approx 1930. Pt noted to be intermittently lethargic. Pt able to state name/, place but after much encouragement. Pt dysarthric and has a hoarse voice. 1-2L via nasal cannula to maintain O2 saturations. Weak cough. SB on telemetry HR 30's-40's while sleeping and 50's while awake. X1 loose/watery BM. 2200- Pt noted to have multiple pauses with hypotension. Orders received for x1 albumin and levo gtt. 0030- Pt noted to have oozying nosebleed and scant blood tinged sputum. O2 saturations WDL on 2L    0145- Pt having continued pauses/hypotension, orders received for dopamine gtt. 0210- Pt desaturating to 80's on 2L, nasal trumpet applied L nare for deep suctioning.      Problem: Patient/Family Goals  Goal: Patient/Family Long Term Goal  Description: Patient's Long Term Goal: \"get strong and be healthy again\"    Interventions:  - Follow up appointments after discharge(PCP, Cardiology)  -Adhere to medication regimen; medication compliance  -Adhere to healthy diet; adequate fluid intake  -Activity as tolerated  -PT/OT evaluation for discharge recommendations   - See additional Care Plan goals for specific interventions  Outcome: Progressing  Goal: Patient/Family Short Term Goal  Description: Patient's Short Term Goal: \"Feel better\"    Interventions:   - Telemetry monitoring  - Continuous IV fluids  - IV antibiotics, urine cultures pending  -2D echocardiogram  -Consults- Cardiology, Dietician, Psych Liaison, PT/OT  -Labs  -Vitals q 4hrs  - See additional Care Plan goals for specific interventions  Outcome: Progressing     Problem: RESPIRATORY - ADULT  Goal: Achieves optimal ventilation and oxygenation  Description: INTERVENTIONS:  - Assess for changes in respiratory status  - Assess for changes in mentation and behavior  - Position to facilitate oxygenation and minimize respiratory effort  - Oxygen supplementation based on oxygen saturation or ABGs  - Provide Smoking Cessation handout, if applicable  - Encourage broncho-pulmonary hygiene including cough, deep breathe, Incentive Spirometry  - Assess the need for suctioning and perform as needed  - Assess and instruct to report SOB or any respiratory difficulty  - Respiratory Therapy support as indicated  - Manage/alleviate anxiety  - Monitor for signs/symptoms of CO2 retention  Outcome: Progressing     Problem: CARDIOVASCULAR - ADULT  Goal: Absence of cardiac arrhythmias or at baseline  Description: INTERVENTIONS:  - Continuous cardiac monitoring, monitor vital signs, obtain 12 lead EKG if indicated  - Evaluate effectiveness of antiarrhythmic and heart rate control medications as ordered  - Initiate emergency measures for life threatening arrhythmias  - Monitor electrolytes and administer replacement therapy as ordered  Outcome: Progressing     Problem: NEUROLOGICAL - ADULT  Goal: Achieves stable or improved neurological status  Description: INTERVENTIONS  - Assess for and report changes in neurological status  - Initiate measures to prevent increased intracranial pressure  - Maintain blood pressure and fluid volume within ordered parameters to optimize cerebral perfusion and minimize risk of hemorrhage  - Monitor temperature, glucose, and sodium.  Initiate appropriate interventions as ordered  Outcome: Progressing  Goal: Absence of seizures  Description: INTERVENTIONS  - Monitor for seizure activity  - Administer anti-seizure medications as ordered  - Monitor neurological status  Outcome: Progressing  Goal: Remains free of injury related to seizure activity  Description: INTERVENTIONS:  - Maintain airway, patient safety  and administer oxygen as ordered  - Monitor patient for seizure activity, document and report duration and description of seizure to MD/LIP  - If seizure occurs, turn patient to side and suction secretions as needed  - Reorient patient post seizure  - Seizure pads on all 4 side rails  - Instruct patient/family to notify RN of any seizure activity  - Instruct patient/family to call for assistance with activity based on assessment  Outcome: Progressing  Goal: Achieves maximal functionality and self care  Description: INTERVENTIONS  - Monitor swallowing and airway patency with patient fatigue and changes in neurological status  - Encourage and assist patient to increase activity and self care with guidance from PT/OT  - Encourage visually impaired, hearing impaired and aphasic patients to use assistive/communication devices  Outcome: Progressing     Problem: CARDIOVASCULAR - ADULT  Goal: Maintains optimal cardiac output and hemodynamic stability  Description: INTERVENTIONS:  - Monitor vital signs, rhythm, and trends  - Monitor for bleeding, hypotension and signs of decreased cardiac output  - Evaluate effectiveness of vasoactive medications to optimize hemodynamic stability  - Monitor arterial and/or venous puncture sites for bleeding and/or hematoma  - Assess quality of pulses, skin color and temperature  - Assess for signs of decreased coronary artery perfusion - ex.  Angina  - Evaluate fluid balance, assess for edema, trend weights  Outcome: Not Progressing

## 2023-08-28 NOTE — CM/SW NOTE
LATE ENTRY  Checked cost of vimpat @ [facility]--with insurance $139.oo per [employer]. Musations for generic @ [facility]--$40.96 to 46.48

## 2023-08-28 NOTE — OPERATIVE REPORT
Bronchoscopy Procedure Report     Preop diagnosis:       respiratory failure  Postop diagnosis:     respiratory failure  Procedure performed: Bronchoscopy, Diagnostic  Bronchoscopy, Therapeutic  Bronchoalveolar lavage, BAL     Sedation used:          propofol     Description of procedure: Informed consent was obtained. Oxygen applied via ETT. Bronchoscope was inserted via ETT  route. Trachea appeared normal. Arabella appeared sharp. Mucous membranes appeared normal. There were bloody secretions noted in the right mainstem bronchus. These were suctioned clear. No evidence of active bleeding identified     Left and right lung were inspected to the subsegmental level. No lesions seen     BAL of RLL completed. 60cc of sterile saline instilled, 10cc of clear fluid aspirated back     Samples obtained:                   BAL of RLL     Post procedure CXR necessary? no  Follow up:      BAL cultures     Patient tolerated the procedure well and was transferred to the recovery area.  EBL was minimal.

## 2023-08-29 NOTE — PLAN OF CARE
Assumed care of pt around 1930. Vented and sedated on propofol. Neuros q4h. GARDNER to pain. SR. Dopamine gtt. Bloody secretions from nose. L rhinorocket in place. Bloody oral secretions. Daughter at bedside. Urinary retention, bladder scan protocol. 2110 pt opened eyes and spit up moderateamount of blood, oral suction done, no decrease in O2 sats, propofol increased and fentanyl given. Dr. Fabio Patino notified, continue to monitor, will update ENT if continues. Daughter updated at bedside.

## 2023-08-29 NOTE — CM/SW NOTE
Care Progression Note:  Length of stay: 12  GMLOS: 3.8  Avoidable Delays:   Code Status: Full    Acute Medical Issue/Factors:   Weakness generalized   Acute encephalopathy  Acute hypoxic respiratory failure. Pt required intubation on 8/22/23 due to AMS, possible sz, aspiration pna and was extubated 8/24. Reintubated on 8/28 s/p epistaxis and concern for aspiration of blood, s/p bronchoscopy with clearance of blood from the airways. Awaiting thoracentesis for pleural effusion tomorrow 8/30/23  Epistaxis- ENT following, Rhinorocket in place  Cardiology following -recurrent sinus bradycardia, pt is on Dopamine gtt    Discharge Barriers: await clinical improvement  Expected discharge date:    Expected next site of care: [facility] Pt is from home alone and plan was made for [facility] at [facility] earlier in this admission    / available for discharge planning.        Joe Aguilar MBA MSN, RN CTL/  [id number]

## 2023-08-29 NOTE — IMAGING NOTE
Received order for R thoracentesis for ICU patient on ventilator. I spoke with Yogesh Hernandez RN at bedside. Pt daughter to sign consent and one sent to unit. Pt has been NPO with tube feedings off since yesterday. PT/INR to be obtain. Will plan for afternoon bedside procedure with IR/US staff.

## 2023-08-29 NOTE — PROCEDURES
[facility]  Procedure Note    Jia Logan Patient Status:  Inpatient    3/11/1940 MRN [id number]   [facility] 6NE-A Attending Slick Mahoney MD   [address] Day # 15 PCP Jessica Cotto MD     Procedure: US guided right thoracentesis    Pre-Procedure Diagnosis:  right pleural effusion    Post-Procedure Diagnosis: same    Anesthesia:  Local    Findings:  serous fluid    Specimens: 1100 cc    Blood Loss:  <5 ml    Tourniquet Time: n/a  Complications:  None  Drains:  removed    Secondary Diagnosis:  none    Hari Recio MD  2023

## 2023-08-29 NOTE — PROGRESS NOTES
No acute event overnight  126/54  P71 Afeb  Intubated with R dobhoff no active bleeding  Rhinorocket in place left  Oropharynx without active bleed  Hgb 8.3  Will keep rocket in place at least till tomorrow  Monitor for further bleeding

## 2023-08-30 NOTE — PROGRESS NOTES
Rhinorocket in place on the left  Small amount of blood in oropharynx  Will keep rocket in place till Friday and deflate then prior to removal.

## 2023-08-30 NOTE — PLAN OF CARE
Assumed care of pt around 1930 s/p thoracentesis. Vented and sedated on propofol. Neuros q4h. GARDNER to pain. See neuro flowsheets. SR. Dopamine gtt. Pulses palpable. Rhonchi. L nare rhinorocket. Bladder scan protocol. Turn q2h. Thoracentisis site oozy, soft, manual pressure and quick clot applied. Straight cath x1.  Wound c/s for L hand tear

## 2023-08-30 NOTE — OCCUPATIONAL THERAPY NOTE
OCCUPATIONAL THERAPY                  OT following. Patient is intubated and sedated. Will follow in order to re-attempt at a later date when clinically appropriate for therapy activity.

## 2023-08-31 NOTE — PLAN OF CARE
No acute changes. Remains orally intubated/sedated on propofol gtt. Rhino Rocket to left nare, plan to remove tomorrow and observe for bleeding, if no further bleeding, will attempt extubation. So far today, no bloody secretions orally or via ETT noted. Pt getting nutrition at goal via radiology placed dobhoff. Utilizing straight cath protocol to empty pt's bladder due to retention. Wound care see's pt today to address left hand wound. POC discussed with pt's daughter in person and son via telephone.

## 2023-08-31 NOTE — CONSULTS
[facility]  Report of Inpatient Wound Care Consultation    Sebas Cunha Patient Status:  Inpatient    3/11/1940 MRN [id number]   [facility] 6NE-A Attending Laila Reed MD   [address] Day # 15 PCP Marek Olea MD     Reason for Consultation:  Left hand skin tear     History of Present Illness:  Sebas Cunha is a a(n) 81year old female. Patient presents with a full thickness skin tear to left hand. History:  Past Medical History:   Diagnosis Date    Acute bronchitis     Anxiety state     Arrhythmia     Asthma     Cataract     Coronary atherosclerosis     Cough     Depression     Disorder of thyroid     Diverticulosis of large intestine     DVT (deep venous thrombosis) (HCC)     left    Esophageal reflux     H/O mammogram 10/21/1999    Hearing impairment     Hepatitis 1970s    High blood pressure     High cholesterol     History of PTCA     Hypothyroid     Muscle weakness     Obesity, unspecified     Osteoarthritis     Prediabetes     Stroke ([address]     Unspecified essential hypertension     Visual impairment     glasses     Past Surgical History:   Procedure Laterality Date    ANGIOGRAM      ANGIOPLASTY (CORONARY)      stent in L ant descend    [address] Right           x 3    CATH DRUG ELUTING STENT      x 2    CATH PERCUTANEOUS  TRANSLUMINAL CORONARY ANGIOPLASTY      CHOLECYSTECTOMY      COLONOSCOPY  2011    DUAL ENERGY X-RAY ABSORPTIOMETRY, BODY COMPOSITION STUDY, 1+ SITES  2011    EEG PHY/QHP EA INCR W/VEEG  2023    OTHER SURGICAL HISTORY      whipple and then surgery a few days later for an infection    OTHER SURGICAL HISTORY      2 stents    SHOULDER SURG PROC UNLISTED      R shoulder    TOTAL KNEE REPLACEMENT        reports that she has never smoked. She has never used smokeless tobacco. She reports current alcohol use. She reports that she does not use drugs.     Allergies:  @ALLERGY    Laboratory Data:  Lab Results Component Value Date    WBC 7.4 08/31/2023    HGB 8.0 08/31/2023    HCT 26.0 08/31/2023    .0 08/31/2023    CREATSERUM 1.08 08/31/2023    BUN 24 08/31/2023     08/31/2023    K 3.4 08/31/2023     08/31/2023    CO2 27.0 08/31/2023     08/31/2023    CA 8.0 08/31/2023    PGLU 193 08/31/2023         Impression:  Wound 08/27/23 Skin Tear Hand Anterior; Left (Active)   Date First Assessed/Time First Assessed: 08/27/23 0800   Present on Original Admission: No  Primary Wound Type: Skin Tear  Location: Hand  Wound Location Orientation: Anterior; Left      Assessments 8/31/2023 11:26 AM   Wound Image     Drainage Amount Moderate   Drainage Description Sanguineous   Wound Length (cm) 5 cm   Wound Width (cm) 3 cm   Wound Surface Area (cm^2) 15 cm^2   Wound Depth (cm) 0.1 cm   Wound Volume (cm^3) 1.5 cm^3   Margins Well-defined edges   Non-staged Wound Description Full thickness   Lynn-wound Assessment Fragile;Edema;Ecchymosis   Wound Granulation Tissue Red;Spongy   Wound Bed Granulation (%) 50 %   Wound Odor None   Shape About 50%  skin flap included in  the measurement        Wound Cleaning and Dressings:    Wound cleansing:  Cleanse with saline  Wound product: Xeroform gauze and ABD pad. May wrap with conforming gauze roll and secure with surgilast.Fragile skin: no tape or adhesive on the skin. Dressing change frequency:  Change dressing daily and/or PRN      Miscellaneous/Additional Orders:  Offloading:Off load heels at all times. Turn Q 2 hours and as needed if possible to prevent further skin breakdown        Thank you for allowing me to participate in the care of your patient. Time Spent 20 minutes  Thank you.     Jeanette Guzman RN, BSN [facility]   Wound Care pager [phone]  8/31/2023  11:28 AM

## 2023-08-31 NOTE — PLAN OF CARE
Assumed care of the pt at approx. 1930pm. Neuro checks q 4 hrs. Vented & sedated with Propofol. Withdraws all extremities to pain. Fentanyl PRN for pain. Bloody oral secretions. L-nasal trumpet. Sats in upper 90s on 30% FiO2. SB/SR. HR down to 30s on Dopamine @ 1 mcg, improved with uptitrating to 2 mcg but pt became hypertensive. Tolerating Tfs. Straight cath per protocol. Imodium administered for watery stools. Turned q 2 hrs.

## 2023-08-31 NOTE — CM/SW NOTE
Met with daughter Qiana Rene at bedside to introduce self, explain my role and talk briefly about discharge planning. Fatmata shared that her parents had resided in [facility] for the past 50 years in a split level home and had been  for 62 years--her Dad unexpectedly passed in June--it was Fatmata who found her Dad in the yard. Emotional support given to daughter. Talks had started when both her parents were alive to consider transitioning to independent senior living but with her father passing, discussions with her mom were shaping around assisted living. Per Fatmata, her mom has the philosophy of the glass 'half filled more of the time than being half empty.'    Discussed present plan to possibly remove rhinorocket tomorrow, with consideration of extubation on Saturday. Moreover, she felt she was being kept abreast of her mother's medical progress  by the team of doctors and nurses. Explained how her mom, after extubation,  will then actively work with therapies again. Reviewed the two types of rehab--acute and subacute. Fatmata had shared her mother's positive Ramon Matos experience post stroke. With acute rehab--patient's evaluated by physiatry--expectation to participate 3-hours daily--likened this patient to a \"rabbit\"  with subacute rehab--program reflects an individualized plan, roughly have the daily therapy times (1 and 1/2 hours)--likened this patient to a \"turtle. \"  When the \"race of rehab begins\"--the path is the same--it's the speed @ which the patient  gets to the finish line that differentiates the two. Highlighted the most important consideration for success is the motivation and drive of the patient. CM to remain available to assist further with additional discharge planning and make new referrals in [facility]--encouraged daughter to also use medicare. gov site to compare facilities.   Fatmata expressed appreciation for the visit

## 2023-09-01 NOTE — PROGRESS NOTES
No new events overnight  Sedated on ventilator  Vss  Balloon deflated at 0900  Balloon removed at 1300 with no further bleeding  Will continue to monitor

## 2023-09-01 NOTE — RESPIRATORY THERAPY NOTE
Pt on current vent settings VC+/12/500/+5/30% ; ETT 7.5 @ 20cm  Suctioned pt as needed throughout the day. Pt tolerating and saturating appropriately. No acute changes, RT to continue to monitor.

## 2023-09-01 NOTE — PLAN OF CARE
VSS, propofol & dopamine gtts. W/D to pain in all extremities, non-purposeful movement intermittently. Mostly clear/tan oral secretions, small amount of old blood this afternoon. Rhino rocket deflated this am & pulled by ENT this afternoon. No evidence of further nosebleed. DHT c TF @ goal rate. 1 small BM today. Straight cathed for 400 mls this afternoon. Turned q2h. Bottom remains excoriated. SB-NSR on monitor. FiO2 @ 30%, afebrile. Daughter & son updated on POC. No other issues at this time, will continue to monitor.

## 2023-09-01 NOTE — PLAN OF CARE
Assumed care of the pt at approx. 1930 pm. Vented & sedated with Propofol. Able to move upper extremities spontaneously, lower withdraws to pain. Small/moderate amount of blood tinged oral secretions overnight clearing up in the am. SB, HR in 50s. VSS. Tolerating Tfs. Imodium for loose stools. BM x1. Straight cath per protocol. Skin kept clean and dry, frequent repositioning.

## 2023-09-02 PROBLEM — R53.1 WEAKNESS GENERALIZED: Status: RESOLVED | Noted: 2023-01-01 | Resolved: 2023-01-01

## 2023-09-02 PROBLEM — R41.89 UNRESPONSIVE: Status: RESOLVED | Noted: 2023-01-01 | Resolved: 2023-01-01

## 2023-09-02 PROBLEM — R53.1 WEAKNESS GENERALIZED: Status: RESOLVED | Noted: 2023-08-17 | Resolved: 2023-09-02

## 2023-09-02 PROBLEM — R41.89 UNRESPONSIVE: Status: RESOLVED | Noted: 2023-08-22 | Resolved: 2023-09-02

## 2023-09-02 NOTE — PLAN OF CARE
Received vented and sedated on propofol, sedation weaned to off, opens eyes to voice but not following commands, GARDNER to painful stim, Dr. Inder Lewis updated, SBT held. Dopamine weaned to off in am, recurrent hypotension, dopamine restarted. Low dose lasix with good diuresis, requires straight cath. Watery stool x1 controlled with immodium. Tolerating tubefeed. Family at bedside throughout shift, updated on condition and POC. Problem: RESPIRATORY - ADULT  Goal: Achieves optimal ventilation and oxygenation  Description: INTERVENTIONS:  - Assess for changes in respiratory status  - Assess for changes in mentation and behavior  - Position to facilitate oxygenation and minimize respiratory effort  - Oxygen supplementation based on oxygen saturation or ABGs  - Provide Smoking Cessation handout, if applicable  - Encourage broncho-pulmonary hygiene including cough, deep breathe, Incentive Spirometry  - Assess the need for suctioning and perform as needed  - Assess and instruct to report SOB or any respiratory difficulty  - Respiratory Therapy support as indicated  - Manage/alleviate anxiety  - Monitor for signs/symptoms of CO2 retention  Outcome: Progressing     Problem: CARDIOVASCULAR - ADULT  Goal: Maintains optimal cardiac output and hemodynamic stability  Description: INTERVENTIONS:  - Monitor vital signs, rhythm, and trends  - Monitor for bleeding, hypotension and signs of decreased cardiac output  - Evaluate effectiveness of vasoactive medications to optimize hemodynamic stability  - Monitor arterial and/or venous puncture sites for bleeding and/or hematoma  - Assess quality of pulses, skin color and temperature  - Assess for signs of decreased coronary artery perfusion - ex.  Angina  - Evaluate fluid balance, assess for edema, trend weights  Outcome: Progressing  Goal: Absence of cardiac arrhythmias or at baseline  Description: INTERVENTIONS:  - Continuous cardiac monitoring, monitor vital signs, obtain 12 lead EKG if indicated  - Evaluate effectiveness of antiarrhythmic and heart rate control medications as ordered  - Initiate emergency measures for life threatening arrhythmias  - Monitor electrolytes and administer replacement therapy as ordered  Outcome: Progressing     Problem: NEUROLOGICAL - ADULT  Goal: Achieves stable or improved neurological status  Description: INTERVENTIONS  - Assess for and report changes in neurological status  - Initiate measures to prevent increased intracranial pressure  - Maintain blood pressure and fluid volume within ordered parameters to optimize cerebral perfusion and minimize risk of hemorrhage  - Monitor temperature, glucose, and sodium.  Initiate appropriate interventions as ordered  Outcome: Progressing  Goal: Absence of seizures  Description: INTERVENTIONS  - Monitor for seizure activity  - Administer anti-seizure medications as ordered  - Monitor neurological status  Outcome: Progressing  Goal: Remains free of injury related to seizure activity  Description: INTERVENTIONS:  - Maintain airway, patient safety  and administer oxygen as ordered  - Monitor patient for seizure activity, document and report duration and description of seizure to MD/LIP  - If seizure occurs, turn patient to side and suction secretions as needed  - Reorient patient post seizure  - Seizure pads on all 4 side rails  - Instruct patient/family to notify RN of any seizure activity  - Instruct patient/family to call for assistance with activity based on assessment  Outcome: Progressing  Goal: Achieves maximal functionality and self care  Description: INTERVENTIONS  - Monitor swallowing and airway patency with patient fatigue and changes in neurological status  - Encourage and assist patient to increase activity and self care with guidance from PT/OT  - Encourage visually impaired, hearing impaired and aphasic patients to use assistive/communication devices  Outcome: Progressing

## 2023-09-02 NOTE — PLAN OF CARE
Assumed care 1930, mechanically ventilated and sedated on propofol. Not following commands. VSS. Straight cathing as needed. No bloody secretions overnight but does have large amount oral secretions. 0500 HR dropped to 37-43 SBP 79, increased dopamine to 2mcg/kg/min, HR/BP improved.

## 2023-09-03 PROBLEM — R33.9 URINARY RETENTION: Status: ACTIVE | Noted: 2023-01-01

## 2023-09-03 PROBLEM — R33.9 URINARY RETENTION: Status: ACTIVE | Noted: 2023-09-03

## 2023-09-03 NOTE — RESPIRATORY THERAPY NOTE
Patient opening eyes but not following commands attempted CPAP but patient triggered Apnea and was placed back on Full support. Will attempt when following commands.

## 2023-09-03 NOTE — PLAN OF CARE
Assumed care 1930, mechanically ventilated and sedated on 10mcg/kg/min. Approx 2200 pt opened eyes, eye contact and some tracking noted, squeezed both hands weakly to command and wiggled BLE toes weakly. Pt was also able to do a slight thumbs up to command on Rt hand. Nodded head a couple times to questions as well. Dopamine also remains at 2mcg/kg/min, HR low 50s. Diuresing well, pt requiring straight cath for retention.

## 2023-09-04 NOTE — PLAN OF CARE
Received vented, sedation off. Opens eyes to voice command, inconsistently follows commands to GARDNER. Failed SBT. Attempted to wean dopamine without success r/t bradycardia. Requiring straight cath, flomax started by Dr. Karson Garcia, unable to give through dobhoff, Dr. Karson Garcia updated. Family at bedside throughout shift, updated on condition and POC. Problem: RESPIRATORY - ADULT  Goal: Achieves optimal ventilation and oxygenation  Description: INTERVENTIONS:  - Assess for changes in respiratory status  - Assess for changes in mentation and behavior  - Position to facilitate oxygenation and minimize respiratory effort  - Oxygen supplementation based on oxygen saturation or ABGs  - Provide Smoking Cessation handout, if applicable  - Encourage broncho-pulmonary hygiene including cough, deep breathe, Incentive Spirometry  - Assess the need for suctioning and perform as needed  - Assess and instruct to report SOB or any respiratory difficulty  - Respiratory Therapy support as indicated  - Manage/alleviate anxiety  - Monitor for signs/symptoms of CO2 retention  Outcome: Progressing     Problem: CARDIOVASCULAR - ADULT  Goal: Maintains optimal cardiac output and hemodynamic stability  Description: INTERVENTIONS:  - Monitor vital signs, rhythm, and trends  - Monitor for bleeding, hypotension and signs of decreased cardiac output  - Evaluate effectiveness of vasoactive medications to optimize hemodynamic stability  - Monitor arterial and/or venous puncture sites for bleeding and/or hematoma  - Assess quality of pulses, skin color and temperature  - Assess for signs of decreased coronary artery perfusion - ex.  Angina  - Evaluate fluid balance, assess for edema, trend weights  Outcome: Progressing  Goal: Absence of cardiac arrhythmias or at baseline  Description: INTERVENTIONS:  - Continuous cardiac monitoring, monitor vital signs, obtain 12 lead EKG if indicated  - Evaluate effectiveness of antiarrhythmic and heart rate control medications as ordered  - Initiate emergency measures for life threatening arrhythmias  - Monitor electrolytes and administer replacement therapy as ordered  Outcome: Progressing     Problem: NEUROLOGICAL - ADULT  Goal: Achieves stable or improved neurological status  Description: INTERVENTIONS  - Assess for and report changes in neurological status  - Initiate measures to prevent increased intracranial pressure  - Maintain blood pressure and fluid volume within ordered parameters to optimize cerebral perfusion and minimize risk of hemorrhage  - Monitor temperature, glucose, and sodium.  Initiate appropriate interventions as ordered  Outcome: Progressing  Goal: Absence of seizures  Description: INTERVENTIONS  - Monitor for seizure activity  - Administer anti-seizure medications as ordered  - Monitor neurological status  Outcome: Progressing  Goal: Remains free of injury related to seizure activity  Description: INTERVENTIONS:  - Maintain airway, patient safety  and administer oxygen as ordered  - Monitor patient for seizure activity, document and report duration and description of seizure to MD/LIP  - If seizure occurs, turn patient to side and suction secretions as needed  - Reorient patient post seizure  - Seizure pads on all 4 side rails  - Instruct patient/family to notify RN of any seizure activity  - Instruct patient/family to call for assistance with activity based on assessment  Outcome: Progressing  Goal: Achieves maximal functionality and self care  Description: INTERVENTIONS  - Monitor swallowing and airway patency with patient fatigue and changes in neurological status  - Encourage and assist patient to increase activity and self care with guidance from PT/OT  - Encourage visually impaired, hearing impaired and aphasic patients to use assistive/communication devices  Outcome: Progressing

## 2023-09-04 NOTE — PLAN OF CARE
Assumed care of pt around 1930. Vented. Opens eyes to speech. GARDNER. Intermittently will attempt to follow simple commands. SB with BBB. HR 50's. Dopamine gtt. Pulses palpable. Breath sounds diminished. Copious amounts of clear, thick oral secretions. Large amount of very thick inline secretions. Requiring frequent suctioning. PRN fentanyl. Tolerating continuous TF through dht. Bladder scan protocol. Turn q2h.     0142: Propofol gtt restarted at 5 mcg/kg/min d/t frequent episodes of coughing on vent, O2 sats decreased to high 70s at times with coughing fits, but would resolve with suctioning and medication    Watery stool, Immodium administered. Straight cath x1.

## 2023-09-04 NOTE — PHYSICAL THERAPY NOTE
Physical Therapy - pt continues to be intubated and sedated. Spoke with RN. Plan to follow-up as appropriate.

## 2023-09-04 NOTE — PROGRESS NOTES
Pt vented on Propofol 5mcg/kg/min, Turned off at 0800 per Dr. Yecenia Fernandez, RN had MD come back into the room to speak with daughter who had arrived after he rounded. Resp rate on vent turned down to 8. Shortly after pt heart rate drop to 30's and  vomited . Sats fluctuated 80-91%  with each episode. (~4-5), everything stabled out. Dr. Mar Porras and KAELYN Avilez with [facility] aware. Hold tube feedings for now. Daughter updated on plan of care. Also spoke to pt's son Nereyda Morris on the phone.

## 2023-09-05 NOTE — PLAN OF CARE
Assumed care of pt around 1930. Vented, no sedation. Neuros q4h. Opening eyes to pain. GARDNER spontaneously and to painful stimuli. SB. HR 50's. Dopamine gtt. Advancing TF as tolerated. Very large, watery BM, imodium given. Breath sounds diminished. Turn q2h. Daughter at bedside.

## 2023-09-05 NOTE — RESPIRATORY THERAPY NOTE
SBT Safety Screen: Performed by R.T. No myocardial ischemia in past 24 hours  Spontaneous respiratory effort present  FiO2: 30   / PEEP: 5  Off or decreasing vasopressors/inotrope doses  Stable hemodynamics, BP  (132/50)  HR    HR = 59  SpO2 > 90%   Saturation = 98    No new arrhythmias    SBT: PASS/FAIL? (FAIL)  Start time:  0908  Settings:  Pressure Support: 10    CPAP: +5    FiO2:  30  Reason for Failure (if present): RSBI > 104   Actual RSBI score: 123    Weaning Parameters:  RR: 24  RSBI: 123  NIF: -6.7  VT: 194  VC: 194   (Note: If VC unable, enter same as VT in Epic to charge)    Returned to Full support: (Time:0950)  MD notified: (Physician: Carlyn Piña)  Current Ventilator Settings:   - Mode: VC+   - Rate: 8   - Tidal Volume:500   - FiO2: 30   - PEEP 5    Weaning parameters obtained on 0 PS. Unable to obtain VC patient not following commands. Vt's 200 on PS 5 increased to 10 to maintain Vt's in 300's patient tolerated 40 min. Will attempt again tomorrow.

## 2023-09-05 NOTE — PLAN OF CARE
VSS, several episodes of non-sustained kole into high 30's/low 40's. SB c BBB on monitor. Afebrile. FiO2 @ 30%, unable to extubate, failed SBT. Copious oral secretions-thick/white. Pt lethargic c non-purposeful movement of all extremities. See neuro FS for more detailed info. Emesis x 2 this afternoon. Loose BM this am. Attempted to advance TF, but after 2nd emesis episode, TF held and MD notified. KUB ordered, PRN meds given. [address] for 750 mls. Dopa gtt continues. Daughter, Sim Whittington, updated on POC. No other issues at this time, will continue to monitor.

## 2023-09-06 NOTE — PROGRESS NOTES
09/06/23 1700   Clinical Encounter Type   Visited With Health care provider   Sacramental Encounters   Sacrament of Sick-Anointing Family requested anointing  (Left message for a  to come in to administer the sacrament.   Awaiting a call back.)

## 2023-09-06 NOTE — PLAN OF CARE
Assumed pt care this am approx 0730. Pt is intubated on no sedation. Pt follows simple commands with more stimulation, pt squeezed R hand and wiggled left toe, opens eyes with more stimulation and will cross midline while following finger. TF at trickle feed rate today. Straight cath as needed, did make some urine output today, 200cc total, still retaining. Flexi seal in place w/ watery stool. Sacrum w/ excoriation, no barrier cream per families wishes, turned Q2. Dr. Iris Spatz, Dr. Robb Meadows, and Denys EDWARDS made aware that family would like to speak regarding plan of care. Pt is now DNR/full support. Pt family is requesting EEG for pt. Plan of care continued.

## 2023-09-06 NOTE — PROGRESS NOTES
Family updated at bedside. All questions answered. They have decided no trach and she will be DNAR/full. Additional critical care time: 25 minutes.

## 2023-09-06 NOTE — PLAN OF CARE
Assumed care of patient at 1930 vented with no sedation. Pt briefly opens eyes when named called. Some tracking noted. Non-purposeful movement with all extremities. Tube feeds restarted at 10ml/hr. Frequent large liquid BM's. Flexi seal placed. Coccyx and bilateral buttock severely excoriated and bleeding. Per daughter Madison Hendrickson to apply Cavilon swabs that were brought in my patients family on area every other day to help with breakdown. Area cleansed and left open to air per daughters request. Turn q2hrs and as needed. POC discussed with daughter. For complete assessment see E charting. 0400 Pt opened eyes when named called.  Weakly squeezed bilateral hands to command and was able to show thumbs up with R thumb, and weakly wiggled bilateral toes

## 2023-09-07 NOTE — PLAN OF CARE
Assumed patient care at 0730. Patient intubated, no sedation infusing. Patient intermittently opens eyes to voice and squeezes hands. Monitor shows sinus rhythm. Patient tolerated breathing trial this morning, ABG paged to Dr. Maggie Rendon, orders to extubate. Patient extubated at 1015 to nasal cannula. Difficulty clearing thick secretions, requiring suctioning. This afternoon, patient began to desaturate and became hypotensive. Patient placed on bipap and dopamine gtt restarted. MDs notified. Family updated with patient condition. Family at bedside, decision to make patient comfort care. MDs aware, comfort care order set initiated. Morphine gtt started. [facility] notified, [id number].

## 2023-09-07 NOTE — PLAN OF CARE
Patient's family present at bedside and are ready to transition to comfort measures, remove bipap and discontinue dopamine. Dr Meghann Spence and Daisy Leavitt notified.  at bedside.      Vu Waller NP  9/7/2023  3:30 PM  [phone]

## 2023-09-07 NOTE — PLAN OF CARE
Assumed care of patient at 1. Pt is intubated on no sedation. Pt not opening eyes when named called and withdrawals all extremities to painful stimuli. Dopamine drip infusing and titrated as able. Tube feeds infusing. Flexiseal in place. Pt's daughter Adams Rodriguez at bedside. Fatmata updated on pt's buttocks and coccyx  severely excoriated and bleeding. This  RN asked if ok to apply Mepliex  to coccyx area over Cavilon swabs. Daughter agreed and Mepliex applied. Turned q2hrs and as needed. For complete assessment see E charting.     0600 Pt opening her eyes when named called. GARDNER. Weakly squeezed bilateral hands to command. Pt attempting with R arm to reach for ETT.

## 2023-09-07 NOTE — PROGRESS NOTES
09/07/23 1529   Clinical Encounter Type   Visited With Family; Health care provider   Crisis Visit Patient actively dying   Referral From Nurse   Referral To    Taxonomy   Intended Effects Build relationship of care and support   Methods Offer support     The  responded to the on call phone. Family requested a  for the anointing of the sick.  informed the family the patient was anointed yesterday, however, would call the patient's parish to request support from a .  Disha Locket with on  - informed  all rites where completed yesterday.  informed the family Spiritual care [address] will be available at 4:30pm today. Family would like the  to visit. Spiritual Care support can be requested via an Epic consult.       [address]

## 2023-09-07 NOTE — PROGRESS NOTES
09/07/23 1642   Clinical Encounter Type   Visited With Patient and family together   Crisis Visit Critical care   Referral From    Referral To    Taxonomy   Intended Effects Helping someone feel comforted   Methods Offer emotional support   Interventions Provide grief resources;Silent prayer; Share written prayer      arrived and offered written prayers, which daughter appreciated, and offered words appropriate for the situation. Large family present ultimately said they did not need anything. Nurse began withdrawal of life support, and  asked if they wanted him to stay, and they declined. All are very sad, quiet, and holding luna. Nothing need be done, per 's assessment.  said he would be in house until tomorrow should a need arise. Spiritual Care support can be requested via an Epic consult.

## 2023-09-07 NOTE — PLAN OF CARE
Shortly after daughter left, pt began to rapidly desaturate and become more hypotensive. Called daughter. Will place on bipap and back on dopamine. Daughter on way back to hospital at which time we will discontinue. Daughter expressed she did not want her mom to die alone.      Discussed with RN and primary team.     Vlad Ortega NP  9/7/2023  1:45 PM  [phone]

## 2023-09-07 NOTE — PROGRESS NOTES
Pt extubated earlier today. Upon entering pts room she was desaturating and was not taking adequate breaths. Pt placed on bipap, settings and parameters listed below. No ABG needed at this time, RN agreed. Will continue to monitor closely. 09/07/23 1320   BiPAP   $ RT Standby Charge (per 15 min) 1   $ BiPAP Initial day & set up Yes   Device Other   BiPAP / CPAP CE# BV03   BiPAP bacteria filter Yes   BiPAP Pre-use check ok? Yes   BIPAP plugged into main power?  Yes   Mode Spontaneous/Timed   Interface Full face mask   Mask Size Medium   Control Settings   Set Rate 16 breaths/min   Set IPAP 12   Set EPAP 6   Oxygen Percent 50 %   BiPAP/CPAP Alarm Settings   Hi Rate 40   Low Rate 10   Hi VT 1200   Low    Hi Pressure 40   Low Pressure 10   Low MV 2   High MV (L/min) 20   Apnea 20   BiPAP/CPAP Monitored Parameters   Pulse 53   SpO2 98 %   PIP 14   Total Rate 20 breaths/min   Minute Volume 9   Tidal Volume 448   Total Leak 20   Ti/Ttot % 31   IPAP 12   EPAP 6   Toleration Well

## 2023-09-07 NOTE — PROGRESS NOTES
09/07/23 0801   Spontaneous Breathing Trial   Spontaneous Breathing Trial Complete Y   Is the FiO2 <= 0.5? (titrated for sats 92-94%) Y   Is the PEEP <= 5? Y   Is the RSBI <=104 Y   Is the patient off pressor and narcotic / sedation drips? Y   Is the patient free of ventricular arrhythmias in the past 24 hours? Y   Is the patient's cough adequate? Y   Is the patient alert (neuro), able to follow commands? N   Daily Screen Meets All Criteria No   Spontaneous Parameters   Spontaneous RR Rate 16   Spontaneous Minute Volume 4.7   Average Spontaneous Tidal Volume 363   $ Spontaneous Vital Capacity   (unable to follow command for VC and NIF)   Total RSBI 54   Weaning Trials   Patient self-extubated? No   Compassionate wean? No   Spontaneous Breathing Trial Time Initiated 0753   Spontaneous Breathing Trial Duration 45   Spontaneous Breathing Trial Method PS   Spontaneous Breathing Trial Settings 5/5/30%   Pre Trial HR 56   Pre Trial RR 8   Pre Trial SpO2 100 %   Pre Trial /57   Pre Trial Vt 503   Post Trial HR 55   Post Trial RR 16   Post Trial SpO2 100 %   Post Trial /56   Post Trial Vt 363     Patient unable to follow commands for VC and NiF. Abg on PS5/5/30% in chart.   Tolerated SBT well

## 2023-09-08 NOTE — PROGRESS NOTES
Assumed care of patient at 1. Pt is comfort care with family at bedside. Morphine drip infusing and titrated for comfort. Emotional support provided to family. Pt  at 0156.

## 2024-01-10 NOTE — TELEPHONE ENCOUNTER
LOV:01/31/2023      NOV:08/21/2023    Medication:   Medication Quantity Refills Start End   levothyroxine 200 MCG Oral Tab 30 tablet 1 7/5/2023    Sig:   Take 1 tablet (200 mcg total) by mouth before breakfast.           [facility] sent to pt. (0) blue, pale

## 2024-10-21 NOTE — PROGRESS NOTES
Christiano Kang is a 67year old female. HPI:   Patient is a 80-year-old female here for follow-up of pneumonia. She was originally seen on 6/11/2019 and placed on a combination of Zithromax and cefuroxime axetil.   She did show clinical improvement but a not provided enough sedation   • Asthma    • Cough    • Disorder of thyroid    • Esophageal reflux    • H/O mammogram 10/21/1999   • High blood pressure    • High cholesterol    • History of PTCA 2011   • Hypothyroid    • Obesity, unspecified    • Unspecif DISCHARGE

## (undated) DIAGNOSIS — R06.02 SHORTNESS OF BREATH: ICD-10-CM

## (undated) DIAGNOSIS — E87.79 OTHER FLUID OVERLOAD: ICD-10-CM

## (undated) DEVICE — Device: Brand: STABLECUT®

## (undated) DEVICE — STERILE POLYISOPRENE POWDER-FREE SURGICAL GLOVES: Brand: PROTEXIS

## (undated) DEVICE — UNIVERSAL STERIBUMP® STERILE (5/CASE): Brand: UNIVERSAL STERIBUMP®

## (undated) DEVICE — 450 ML BOTTLE OF 0.05% CHLORHEXIDINE GLUCONATE IN 99.95% STERILE WATER FOR IRRIGATION, USP AND APPLICATOR.: Brand: IRRISEPT ANTIMICROBIAL WOUND LAVAGE

## (undated) DEVICE — SPECIMEN CONTAINER,POSITIVE SEAL INDICATOR, OR PACKAGED: Brand: PRECISION

## (undated) DEVICE — LIGHT HANDLE

## (undated) DEVICE — ZIMMER® STERILE DISPOSABLE TOURNIQUET CUFF WITH PLC, DUAL PORT, SINGLE BLADDER, 34 IN. (86 CM)

## (undated) DEVICE — SOL  .9 3000ML

## (undated) DEVICE — CHLORAPREP ORANGE TINT 10.5ML

## (undated) DEVICE — STOCK ORTH TUB 72X8IN NLTX

## (undated) DEVICE — KENDALL SCD EXPRESS SLEEVES, KNEE LENGTH, MEDIUM: Brand: KENDALL SCD

## (undated) DEVICE — WRAP COOLING KNEE W/ICE PILLOW

## (undated) DEVICE — LAPAROTOMY SPONGE - RF AND X-RAY DETECTABLE PRE-WASHED: Brand: SITUATE

## (undated) DEVICE — TOTAL KNEE CDS: Brand: MEDLINE INDUSTRIES, INC.

## (undated) DEVICE — CHLORAPREP 26ML APPLICATOR

## (undated) DEVICE — SUTURE VICRYL 2-0 FSL

## (undated) DEVICE — PLASTC TOOMEY SYRNG DISP

## (undated) DEVICE — SUTURE ETHIBOND 1 OS-6

## (undated) DEVICE — 2C14 #2 PDO 45 X 45: Brand: 2C14 #2 PDO 45 X 45

## (undated) DEVICE — DECANTER BAG 9": Brand: MEDLINE INDUSTRIES, INC.

## (undated) DEVICE — BOWL CEMENT MIX QUICK-VAC

## (undated) DEVICE — SUTURE VICRYL 0 CP-1

## (undated) NOTE — LETTER
Patient Name: Louie Kelsey  YOB: 1940          MRN number:  [id number]  Date:  10/12/2017  Referring Physician:  Nicole Blizzard          Discharge Summary    Pt has attended 14 visits in Physical Therapy.  Gillian Anaya reports feeling significant Patient/Family/Caregiver was advised of these findings, precautions, and treatment options and has agreed to actively participate in planning and for this course of care.     Thank you for your referral. If you have any questions, please contact me at Dept:

## (undated) NOTE — LETTER
[facility] [address], [address] [place] [address]    Consent for Anesthesia   1.    Sarai Mott agree to be cared for by an anesthesiologist, who is specially trained to monitor me and give me medicine to put me to sleep or keep me comforta vision, nerves, or muscles and in extremely rare instances death. 5. My doctor has explained to me other choices available to me for my care (alternatives).   6. Pregnant Patients (“epidural”):  I understand that the risks of having an epidural (medicine g

## (undated) NOTE — IP AVS SNAPSHOT
[address]            (For Outpatient Use Only) Initial Admit Date: 12/16/2018   Inpt/Obs Admit Date: Inpt: 12/16/18 / Obs: N/A   Discharge Date:    Cheyenne Shirley:  [de-identified]   MRN: [de-identified]   CSN: [id number]        [id number]  [id number] Subscriber Name:  Jai  :    Subscriber ID:  Pt Rel to Subscriber:    Hospital Account Financial Class: Medicare    2018

## (undated) NOTE — LETTER
Patient Name: Sophie Graham  YOB: 1940          MRN number:  [id number]  Date:  8/25/2017  Referring Physician:  Ketty Schwarz          LOWER EXTREMITY EVALUATION:    Referring Physician: Dr. Cuca Dominguez  Diagnosis: Leg weakness, bilateral skilled Physical Therapy to address the above impairments to achieve goals listed below. Precautions:  Drug Allergy  OBJECTIVE:   Observation: forward head posture  Sensation: Sensation intact to light touch.     AROM:   Hip Knee Foot/Ankle   WNL Flexio (3)  Normal: Walks 20’, no assistive devices, good speed, no evidence of imbalance, normal gait pattern. (2)  Mild Impairment: Walks 20’, uses assistive devices, slower speed, mild gait deviations.   (1)  Moderate Impairment: Walks 20’, slow speed, abnorma (2) Mild Impairment: Preforms head turns smoothly with slight change in gait velocity, i.e., minor disruption to smooth gait path or uses walking aid.   (1) Moderate Impairment: Preforms head turns with moderate change in gait velocity, slows down, staggers Patient/Family/Caregiver was advised of these findings, precautions, and treatment options and has agreed to actively participate in planning and for this course of care.     Thank you for your referral. Please co-sign or sign and return this letter via fax

## (undated) NOTE — IP AVS SNAPSHOT
[address]            (For Outpatient Use Only) Initial Admit Date: 10/1/2022   Inpt/Obs Admit Date: Inpt: 10/2/22 / Obs: N/A   Discharge Date:    Everett Sena:  [de-identified]   MRN: [de-identified]   CSN: [id number]   CEID: [id number]        ENCOUNTER  Patient Class: Inpatient Admitting Provider: Hawa Dupont MD Unit: [address] Service: Cardiac Telemetry Attending Provider: Mirna Crenshaw DO   Bed: 3620-A   Visit Type:   Referring Physician: No ref. provider found Billing Flag:    Admit Diagnosis: Acute CVA (cerebrovascular accident) [facility]) [I63.9]      PATIENT  Legal Name:   Carolyne Villanueva   Legal Sex: Female  Gender ID: Female             Pref Name:    PCP:  Lyle Dowd MD Home: [phone]   Address:  [address] [place] [address] : 3/11/1940 (82 yrs) Mobile: [phone]         City/State/Zip: [address] [place] [address], [address] [place] [address] Marital:  Language: [address] [place] [address] Drive: Will SSN4: [id number] Adventist: Rastafarian - [employer] C*     Race: White Ethnicity: Non  Or  O*   EMERGENCY CONTACT   Name Relationship Legal Guardian? Home Phone Work Phone Mobile Phone   1. Fidel Kwok  2.  Fatmata Sanches Spouse  Daughter    [phone]     GUARANTOR  Guarantor: Tim Conroy : 3/11/1940 Home Phone: [phone]   Address: [address] [place] [address]  Sex: Female Work Phone:    City/State/Zip: [address] [place] [address], [address] [place] [address]   Rel. to Patient: Self Guarantor ID: [id number]   GUARANTOR EMPLOYER   Employer:  Status: RETIRED     COVERAGE  PRIMARY INSURANCE   Payor: MEDICARE Plan: MEDICARE PART A&B   Group Number:  Insurance Type: INDEMNITY   Subscriber Name: Charanjit Urban : 1940   Subscriber ID: [id number] Pt Rel to Subscriber: Self   SECONDARY INSURANCE   Payor: AARP Plan: AARP   Group Number:  Insurance Type: INDEMNITY   Subscriber Name: Charanjit Urban : 3/11/1940   Subscriber ID: [id number] Pt Rel to Subscriber: SELF   TERTIARY INSURANCE   Payor:  Plan:    Group Number:  Insurance Type:    Subscriber Name:  Subscriber :    Subscriber ID:  Pt Rel to Subscriber:    Hospital Account Financial Class: Medicare    2022

## (undated) NOTE — LETTER
Date: 2021              Patient Name: Magda Abdalla   : 1940            To Whom it may concern: This letter has been written at the patient's request. The above patient is under my care as well as the care of Dr Smith/Cardiology.  D

## (undated) NOTE — MR AVS SNAPSHOT
[facility], [address] [address] [phone]               Thank you for choosing us for your health care visit with Diallo Blood MD.  We are glad to serve you and happy to provide you with this thomson [address], 75 [facility] ([facility])    [address], [address] [address] [phone]              Reason for Today's Visit     Pre-Op Exam           Medical Issues Discussed Today     Preoperativ Call the [employer] for assistance with your inactive Patient Home Monitoring account    If you have questions, you can call [phone] to talk to our [facility] Staff. Remember, Patient Home Monitoring is NOT to be used for urgent needs. For medical emergencies, dial 911.     Vi

## (undated) NOTE — LETTER
[address] [facility] FOR BLOOD OR BLOOD COMPONENTS      In the course of your treatment, it may become necessary to administer a transfusion of blood or blood components. This form provides basic information concerning this procedure and, if signed by you, authorizes its performance by qualified medical personnel. DESCRIPTION OF PROCEDURE:  Blood is introduced into one of your veins, commonly in the arm, using a sterilized disposable needle. The amount of blood transfused, and whether the transfusion will be of blood or blood components is a judgment the physician will make based on your particular needs. RISKS:  The transfusion is a common procedure of low risk. MINOR AND TEMPORARY REACTIONS ARE NOT UNCOMMON, including a slight bruise, swelling or local reaction in the area where the needle pierces your skin, or a non-serious reaction to the transfused material itself, including headache, fever or a mild skin reaction, such as rash. Serious reactions are possible, though very unlikely and include severe allergic reaction (shock)  and destruction (hemolysis) of transfused blood cells. Infectious diseases which are known to be transmitted by blood transfusion include CERTAIN TYPES OF VIRAL HEPATITIS, a viral infection of the liver, HUMAN IMMUNODEFICIENCY VIRUS (HIV-1,2) infection, a viral infection known to cause ACQUIRED IMMUNODEFICIENCY SYNDROME (AIDS) AS WELL AS CERTAIN OTHER BACTERIAL, VIRAL AND PARASITIC DISEASES. While a minimal risk of acquiring an infectious disease from transfused blood exists, in accordance with Federal and State law all due care has been taken in donor selection and testing to avoid transmission of disease. ALTERNATIVES:  If loss of blood poses serious threats in the course of your treatment, THERE IS NO EFFECTIVE ALTERNATIVE TO BLOOD TRANSFUSION.  However, if you have any further questions on this matter, your physician will fully explain the alternatives to you if it has not already been done. I,Lily Kwok, have read/had read to me the above. I understand the matters bearing on the decision whether or not to authorize a transfusion of blood or blood components. I have no questions which have not been answered to my full satisfaction.  I hereby consent to such transfusion as  my physician may deem necessary or advisable in the course of my treatment.        _______________   __________________________________________________  Date     Signature of Patient, Parent or Legal Guardian      ([facility])      __________________________________________  Witness to Signature (title or relationship to patient)    Patient Name: Nick Ibrahim     : 3/11/1940                 Printed: 2023     Medical Record #: [id number]                    Page 1 of 1

## (undated) NOTE — ED AVS SNAPSHOT
Yolanda Courser   MRN: [id number]    Department:  [facility] Emergency Department   Date of Visit:  12/30/2019           Disclosure     Insurance plans vary and the physician(s) referred by the ER may not be covered by your plan.  Please contact yo tell this physician (or your personal doctor if your instructions are to return to your personal doctor) about any new or lasting problems. The primary care or specialist physician will see patients referred from the [facility] Emergency Department.  Brayden Acevedo

## (undated) NOTE — IP AVS SNAPSHOT
Patient Demographics     Address  [address] [place] [address] Phone  [phone] [facility])  [phone] (Mobile) *Preferred* E-mail Address  [email or web address]      Emergency Contact(s)     Name Relation Home Work Mobile    Omi MEJÍA [id number] Pantoprazole Sodium (PROTONIX) 40 mg in Sodium Chloride 0.9 % 10 mL IV push 12/16/18 0711 Given      [id number] morphINE sulfate (PF) 4 MG/ML injection 1 mg 12/16/18 0711 Given      [id number] morphINE sulfate (PF) 4 MG/ML injection 1 mg 12/16/18 eGFR calculated by the CKD-EPI equation.    AST 14 15 - 41 U/L  [facility]   Alt 17 14 - 54 U/L — [facility]   Alkaline Phosphatase 112 55 - 142 U/L — [facility]   Bilirubin, Total 0.4 0.1 - 2.0 mg/dL — [facility]   Total Protein 6.8 6.4 - 8.2 g/dL Austin Hauser Transitional Cells None Seen Small /LPF — [facility]   Mucous Urine 1+ None Seen A [facility]   Yeast Urine None Seen None Seen — [facility]            CBC WITH DIFFERENTIAL WITH PLATELET [[id number]]  Resulted: 12/16/18 0435, Result status: Final result Hyperlipidemia, DM type II, and h/o whipple's Sx who comes to the ER with complaints of abdominal pain. She states that it started yesterday suddenly.   She initially started feeling some twinges throughout her abdomen which later progressed to more severe • Heart Disorder Sister         CHF   • Cancer Brother         Prostate   • Heart Disorder Brother         MI       Allergies:   Codeine                 NAUSEA AND VOMITING    Comment:\"derivatives\"    Medications:    No current facility-administered medi Abdomen: Soft, nontender, nondistended. Positive bowel sounds. No rebound, guarding or organomegaly. Neurologic: No focal neurological deficits. CNII-XII grossly intact. Musculoskeletal: Moves all extremities. Extremities: No edema or cyanosis.   Integu 2018  6:03 AM         [facility]  History and Physical     Burnice Endo Patient Status:  Emergency    3/11/1940 MRN [id number]   Location [facility] Attending Yoli Luciano MD   Hosp Day # 0 PCP Nazia Castro • DUAL ENERGY X-RAY ABSORPTIOMETRY, BODY COMPOSITION STUDY, 1+ SITES  2/18/2011   • OTHER SURGICAL HISTORY      whipple   • OTHER SURGICAL HISTORY      2 stents   • SHOULDER SURG PROC UNLISTED      R shoulder       Social History:  reports that  has never Ht 5' 2\" (1.575 m)   Wt 210 lb (95.3 kg)   SpO2 96%   BMI 38.41 kg/m²   General: No acute distress. Alert and oriented x 3. HEENT: Normocephalic atraumatic. Moist mucous membranes. EOM-I. PERRLA. Anicteric. Neck: No lymphadenopathy. No JVD.  No carotid Consults - MD Consult Notes      Consults signed by Codi Francisco MD at 12/16/2018 12:33 PM     Author:  Codi Francisco MD Service:  General Surgery Author Type:  Physician    Filed:  12/16/2018 12:33 PM Date of Service:  12/16/2018 11:22 [place]ha [place], [place]      History:  Past Medical History:   Diagnosis Date   • Acute bronchitis    • Anesthesia complication     patient woke up during her colonoscopy was not provided enough sedation   • Asthma    • Cough    • Disorder of thyroid    • Esop Allergic/Immuno:  Review of patient's allergies indicates allergy to codeine  Cardiovascular:  Negative for cool extremity and irregular heartbeat/palpitations  Constitutional:  Negative for decreased activity, fever, irritability and lethargy  Endocrine: Extremities:  No lower extremity edema noted. Without clubbing or cyanosis. Skin: Normal texture and turgor.       Laboratory Data:  Recent Labs   Lab  12/16/18   0423   RBC  4.33   HGB  11.7*   HCT  37.9   MCV  87.5   MCH  27.0   MCHC  30.9*   RDW  1 anastomotic sutures in the small bowel of the left mid abdomen.   There is small bowel feces sign with dilatation of the loop adjacent to the anastomotic sutures which could be due   to scar tissue are adhesions causing small bowel obstruction proximal to t transfer. She will return to her operating surgeon for further management of her current small bowel obstruction. There is no indication for acute general surgical intervention at this time. Plan:  1.  Transfer to [facility] to [facility]

## (undated) NOTE — LETTER
[address]  [address]: [phone]              September 9, 2023    To Whom It May Concern,     This letter is to confirm that the family of Kostas Tran suffered her unexpected passing following an acute illness on September 7, 2023 at [facility]. Please extend her family understanding for any unexpected alterations in previously made commitments or plans.      Respectfully,     Marj Mahmood, [address] [place] [address]  [phone]

## (undated) NOTE — MR AVS SNAPSHOT
[facility], [address] [id number] [phone]               Thank you for choosing us for your health care visit with Chester Stevens MD.  We are glad to serve you and happy to provide you with this thomson metoprolol Tartrate 25 MG Tabs   Take 0.5 tablets (12.5 mg total) by mouth 2x Daily(Beta Blocker). Commonly known as:  LOPRESSOR           Pantoprazole Sodium 40 MG Tbec   TAKE 1 TABLET (40 MG TOTAL) BY MOUTH DAILY.    Commonly known as:  PROTONIX long-term current use of insulin ([facility].) [E11.9]           TSH and Free T4 [E]    Complete by:  Aug 19, 2017 (Approximate)    Thyroid profile includes: T4, free  and TSH   Assoc Dx:  Hypothyroidism, unspecified type [E03.9]                 Follow-up Instruct Don’t eat while distracted and slow down. Avoid over sized portions. Don’t eat while when you’re bored.      EAT THESE FOODS MORE OFTEN: EAT THESE FOODS LESS OFTEN:   Make half your plate fruits and vegetables Highly refined, white starches including wh

## (undated) NOTE — LETTER
Patient Name: Kasie Hartman  YOB: 1940        MRN number:  [id number]  Date:  9/26/2017  Referring Physician:  Subha Holman     Progress Summary  Pt has attended 10, cancelled 0, and no shown 0 visits in Physical Therapy.  Yecenia Staley reports fe · Pt will be independent and compliant with comprehensive HEP to maintain progress achieved in PT.  In progress   G Codes: [id number]; [id number] (mobility)  Rehab Potential: Good due to progress achieved thus far  Plan: Continue PT POC x 4 more visits to achieve

## (undated) NOTE — LETTER
[facility] [address], [address]  Consent for Procedure/Sedation    Date:     Time:       1.  I authorize the performance upon Dayne Sterling the following:cardiac catheterization, left ventricular cineangiography, bilateral reid period, the physician will determine when the applicable recovery period ends for purposes of reinstating the Do Not Resuscitate (DNR) order.     Signature of Patient: ____________________________________________________    Signature of person authorized

## (undated) NOTE — IP AVS SNAPSHOT
[facility] [place] [address] [place], [address] ~ [phone]                Discharge Summary   3/31/2017    Christiano Kang           Admission Information        Provider Department    3/31/2017 Alicia Fulton MD [facility]      A [    ]    [    ]       Sertraline HCl 50 MG Tabs   Commonly known as:  ZOLOFT        Take 50 mg by mouth daily.       [    ]    [    ]    [    ]    [    ]                 Patient [id number] 0.3 (02/13/17)  6.4 (02/13/17)  3.1 (L) (02/13/17)  144 (02/13/17)  4.8 (02/13/17)  113 (H)      Radiology Exams     None         Additional Information       We are concerned for your overall well being:    - If you are a smoker or have smoked in the last